# Patient Record
Sex: FEMALE | Race: WHITE | Employment: OTHER | URBAN - METROPOLITAN AREA
[De-identification: names, ages, dates, MRNs, and addresses within clinical notes are randomized per-mention and may not be internally consistent; named-entity substitution may affect disease eponyms.]

---

## 2017-01-16 RX ORDER — ATORVASTATIN CALCIUM 40 MG/1
TABLET, FILM COATED ORAL
Qty: 90 TAB | Refills: 1 | Status: SHIPPED | OUTPATIENT
Start: 2017-01-16 | End: 2017-07-03 | Stop reason: SDUPTHER

## 2017-01-16 RX ORDER — FENOFIBRIC ACID 135 MG/1
CAPSULE, DELAYED RELEASE ORAL
Qty: 90 CAP | Refills: 1 | Status: SHIPPED | OUTPATIENT
Start: 2017-01-16 | End: 2017-07-03 | Stop reason: SDUPTHER

## 2017-01-16 RX ORDER — VENLAFAXINE HYDROCHLORIDE 75 MG/1
75 CAPSULE, EXTENDED RELEASE ORAL DAILY
Qty: 30 CAP | Refills: 3 | Status: SHIPPED | OUTPATIENT
Start: 2017-01-16 | End: 2017-05-24 | Stop reason: SDUPTHER

## 2017-01-16 RX ORDER — VENLAFAXINE HYDROCHLORIDE 150 MG/1
CAPSULE, EXTENDED RELEASE ORAL
Qty: 90 CAP | Refills: 1 | Status: SHIPPED | OUTPATIENT
Start: 2017-01-16 | End: 2017-08-02 | Stop reason: SDUPTHER

## 2017-03-07 DIAGNOSIS — K21.9 GASTROESOPHAGEAL REFLUX DISEASE WITHOUT ESOPHAGITIS: ICD-10-CM

## 2017-03-07 RX ORDER — OMEPRAZOLE 40 MG/1
40 CAPSULE, DELAYED RELEASE ORAL DAILY
Qty: 90 CAP | Refills: 1 | Status: SHIPPED | OUTPATIENT
Start: 2017-03-07 | End: 2017-09-20 | Stop reason: SDUPTHER

## 2017-03-16 ENCOUNTER — HOSPITAL ENCOUNTER (OUTPATIENT)
Dept: LAB | Age: 72
Discharge: HOME | End: 2017-03-16
Payer: MEDICARE

## 2017-03-16 ENCOUNTER — OFFICE VISIT (OUTPATIENT)
Dept: FAMILY MEDICINE CLINIC | Age: 72
End: 2017-03-16

## 2017-03-16 VITALS
OXYGEN SATURATION: 99 % | HEART RATE: 74 BPM | RESPIRATION RATE: 16 BRPM | SYSTOLIC BLOOD PRESSURE: 126 MMHG | HEIGHT: 63 IN | TEMPERATURE: 97.7 F | WEIGHT: 181 LBS | DIASTOLIC BLOOD PRESSURE: 77 MMHG | BODY MASS INDEX: 32.07 KG/M2

## 2017-03-16 DIAGNOSIS — F41.9 ANXIETY: ICD-10-CM

## 2017-03-16 DIAGNOSIS — E03.9 HYPOTHYROIDISM, UNSPECIFIED TYPE: ICD-10-CM

## 2017-03-16 DIAGNOSIS — R51.9 HEADACHE, UNSPECIFIED HEADACHE TYPE: ICD-10-CM

## 2017-03-16 DIAGNOSIS — E78.2 MIXED HYPERLIPIDEMIA: ICD-10-CM

## 2017-03-16 DIAGNOSIS — E11.9 TYPE 2 DIABETES MELLITUS WITHOUT COMPLICATION, WITHOUT LONG-TERM CURRENT USE OF INSULIN (HCC): Primary | ICD-10-CM

## 2017-03-16 DIAGNOSIS — R29.6 FALLS FREQUENTLY: ICD-10-CM

## 2017-03-16 PROCEDURE — 80048 BASIC METABOLIC PNL TOTAL CA: CPT

## 2017-03-16 PROCEDURE — 80061 LIPID PANEL: CPT

## 2017-03-16 PROCEDURE — 36415 COLL VENOUS BLD VENIPUNCTURE: CPT

## 2017-03-16 PROCEDURE — 84443 ASSAY THYROID STIM HORMONE: CPT

## 2017-03-16 PROCEDURE — 83036 HEMOGLOBIN GLYCOSYLATED A1C: CPT

## 2017-03-16 NOTE — MR AVS SNAPSHOT
Visit Information Date & Time Provider Department Dept. Phone Encounter #  
 3/16/2017 10:30 AM Wallace Mckenzie Florentino [phone] [id number] Follow-up Instructions Return in about 4 weeks (around 4/13/2017), or sooner as needed. Your Appointments 5/4/2017 11:20 AM  
Medicare Physical with Donnette Gitelman, MD  
[facility] ([address]) Appt Note: -letter mailed [address] [address]  
[place] [address] [address] Upcoming Health Maintenance Date Due DTaP/Tdap/Td series (1 - Tdap) 10/2/2006 BREAST CANCER SCRN MAMMOGRAM 8/25/2016 EYE EXAM RETINAL OR DILATED Q1 4/13/2017 HEMOGLOBIN A1C Q6M 4/28/2017 MEDICARE YEARLY EXAM 4/30/2017 FOOT EXAM Q1 7/25/2017 MICROALBUMIN Q1 10/28/2017 LIPID PANEL Q1 10/28/2017 GLAUCOMA SCREENING Q2Y 4/13/2018 COLONOSCOPY 7/7/2020 Allergies as of 3/16/2017  Review Complete On: 3/16/2017 By: Maverick Peguero MD  
  
 Severity Noted Reaction Type Reactions Deserpidine  11/24/2014   Systemic Other (comments) Puts pressure around her head like a ring Current Immunizations  Reviewed on 10/25/2016 Name Date Influenza Vaccine 11/16/2015, 11/6/2012, 11/2/2011, 10/29/2010 Influenza Vaccine (Quad) PF 10/25/2016 Pneumococcal Conjugate (PCV-13) 9/10/2015 Pneumococcal Vaccine (Unspecified Type) 10/29/2010 Td 10/1/2006 Zoster Vaccine, Live 6/10/2014 Not reviewed this visit You Were Diagnosed With   
  
 Codes Comments Type 2 diabetes mellitus without complication, without long-term current use of insulin (HCC)    -  Primary ICD-10-CM: E11.9 ICD-9-CM: 250.00 Hypothyroidism, unspecified type     ICD-10-CM: E03.9 ICD-9-CM: 244.9 Mixed hyperlipidemia     ICD-10-CM: E78.2 ICD-9-CM: 272.2 Anxiety     ICD-10-CM: F41.9 ICD-9-CM: 300.00 Falls frequently     ICD-10-CM: R29.6 ICD-9-CM: V15.88 Headache, unspecified headache type     ICD-10-CM: R51 ICD-9-CM: 075. 0 Vitals BP Pulse Temp Resp Height(growth percentile) Weight(growth percentile) 126/77 (BP 1 Location: Left arm, BP Patient Position: Sitting) 74 97.7 °F (36.5 °C) (Oral) 16 5' 3\" (1.6 m) 181 lb (82.1 kg) SpO2 BMI OB Status Smoking Status 99% 32.06 kg/m2 Postmenopausal Never Smoker BMI and BSA Data Body Mass Index Body Surface Area 32.06 kg/m 2 1.91 m 2 Preferred Pharmacy Pharmacy Name Phone [facility], [address] Your Updated Medication List  
  
   
This list is accurate as of: 3/16/17 11:21 AM.  Always use your most recent med list.  
  
  
  
  
 aspirin delayed-release 81 mg tablet Take 1 Tab by mouth daily. atorvastatin 40 mg tablet Commonly known as:  LIPITOR  
TAKE ONE TABLET BY MOUTH DAILY  
  
 fenofibric acid 135 mg capsule Commonly known as:  TRILIPIX ER  
TAKE ONE CAPSULE BY MOUTH DAILY  
  
 levothyroxine 75 mcg tablet Commonly known as:  SYNTHROID Take 1 Tab by mouth Daily (before breakfast). omega-3 fatty acids-vitamin e 1,000 mg Cap Commonly known as:  FISH OIL Take 1 Cap by mouth daily. omeprazole 40 mg capsule Commonly known as:  PRILOSEC Take 1 Cap by mouth daily. SITagliptin 25 mg tablet Commonly known as:  Gillespie Miles TAKE ONE TABLET BY MOUTH DAILY * venlafaxine- mg capsule Commonly known as:  EFFEXOR-XR  
TAKE ONE CAPSULE BY MOUTH DAILY * venlafaxine-SR 75 mg capsule Commonly known as:  EFFEXOR-XR Take 1 Cap by mouth daily. vitamin D3-vitamin K2 (MK4) 1,000-100 unit-mcg Tab Take 5,000 Int'l Units by mouth daily. * Notice: This list has 2 medication(s) that are the same as other medications prescribed for you.  Read the directions carefully, and ask your doctor or other care provider to review them with you. We Performed the Following HEMOGLOBIN A1C WITH EAG [40293 CPT(R)] LIPID PANEL [12250 CPT(R)] METABOLIC PANEL, BASIC [15473 CPT(R)] TSH 3RD GENERATION [37983 CPT(R)] Follow-up Instructions Return in about 4 weeks (around 4/13/2017), or sooner as needed. To-Do List   
 03/16/2017 Imaging:  CT HEAD WO CONT Patient Instructions Preventing Falls: Care Instructions Your Care Instructions Getting around your home safely can be a challenge if you have injuries or health problems that make it easy for you to fall. Loose rugs and furniture in walkways are among the dangers for many older people who have problems walking or who have poor eyesight. People who have conditions such as arthritis, osteoporosis, or dementia also have to be careful not to fall. You can make your home safer with a few simple measures. Follow-up care is a key part of your treatment and safety. Be sure to make and go to all appointments, and call your doctor if you are having problems. It's also a good idea to know your test results and keep a list of the medicines you take. How can you care for yourself at home? Taking care of yourself · You may get dizzy if you do not drink enough water. To prevent dehydration, drink plenty of fluids, enough so that your urine is light yellow or clear like water. Choose water and other caffeine-free clear liquids. If you have kidney, heart, or liver disease and have to limit fluids, talk with your doctor before you increase the amount of fluids you drink. · Exercise regularly to improve your strength, muscle tone, and balance. Walk if you can. Swimming may be a good choice if you cannot walk easily. · Have your vision and hearing checked each year or any time you notice a change. If you have trouble seeing and hearing, you might not be able to avoid objects and could lose your balance. · Know the side effects of the medicines you take. Ask your doctor or pharmacist whether the medicines you take can affect your balance. Sleeping pills or sedatives can affect your balance. · Limit the amount of alcohol you drink. Alcohol can impair your balance and other senses. · Ask your doctor whether calluses or corns on your feet need to be removed. If you wear loose-fitting shoes because of calluses or corns, you can lose your balance and fall. · Talk to your doctor if you have numbness in your feet. Preventing falls at home · Remove raised doorway thresholds, throw rugs, and clutter. Repair loose carpet or raised areas in the floor. · Move furniture and electrical cords to keep them out of walking paths. · Use nonskid floor wax, and wipe up spills right away, especially on ceramic tile floors. · If you use a walker or cane, put rubber tips on it. If you use crutches, clean the bottoms of them regularly with an abrasive pad, such as steel wool. · Keep your house well lit, especially Rudine Lowing, and outside walkways. Use night-lights in areas such as hallways and bathrooms. Add extra light switches or use remote switches (such as switches that go on or off when you clap your hands) to make it easier to turn lights on if you have to get up during the night. · Install sturdy handrails on stairways. · Move items in your cabinets so that the things you use a lot are on the lower shelves (about waist level). · Keep a cordless phone and a flashlight with new batteries by your bed. If possible, put a phone in each of the main rooms of your house, or carry a cell phone in case you fall and cannot reach a phone. Or, you can wear a device around your neck or wrist. You push a button that sends a signal for help. · Wear low-heeled shoes that fit well and give your feet good support. Use footwear with nonskid soles.  Check the heels and soles of your shoes for wear. Repair or replace worn heels or soles. · Do not wear socks without shoes on wood floors. · Walk on the grass when the sidewalks are slippery. If you live in an area that gets snow and ice in the winter, sprinkle salt on slippery steps and sidewalks. Preventing falls in the bath · Install grab bars and nonskid mats inside and outside your shower or tub and near the toilet and sinks. · Use shower chairs and bath benches. · Use a hand-held shower head that will allow you to sit while showering. · Get into a tub or shower by putting the weaker leg in first. Get out of a tub or shower with your strong side first. 
· Repair loose toilet seats and consider installing a raised toilet seat to make getting on and off the toilet easier. · Keep your bathroom door unlocked while you are in the shower. Where can you learn more? Go to http://celestina-dontae.info/. Enter [phone] in the search box to learn more about \"Preventing Falls: Care Instructions. \" Current as of: August 4, 2016 Content Version: 11.1 © 6793-4702 [employer]. Care instructions adapted under license by [employer] (which disclaims liability or warranty for this information). If you have questions about a medical condition or this instruction, always ask your healthcare professional. [place] [address] any warranty or liability for your use of this information. Introducing [facility]! Dear Kamaljit Garza: Thank you for requesting a Vinobo account. Our records indicate that you already have an active Vinobo account. You can access your account anytime at [email or web address] Did you know that you can access your hospital and ER discharge instructions at any time in Vinobo? You can also review all of your test results from your hospital stay or ER visit. Additional Information If you have questions, please visit the Frequently Asked Questions section of the KangaDo website at [email or web address] Remember, KangaDo is NOT to be used for urgent needs. For medical emergencies, dial 911. Now available from your iPhone and Android! Please provide this summary of care documentation to your next provider. Your primary care clinician is listed as Jake Velarde. If you have any questions after today's visit, please call [phone].

## 2017-03-16 NOTE — PROGRESS NOTES
Subjective: Emma Murillo is a 66 y.o. female here today to establish care and for fasting labs. Transferring care from [facility]. States that she feels as though there is something pulling her body back and that this is causing her falls. Reports 3 falls over the past few weeks. Reports that her hands and feet feel as though they are going to sleep. Associated with headache. Denies visual disturbance. She reports problem with her hearing and she has hearing aids. She has been seen by Audiology and was told that she has a lot of wax in the ears. She wonders if this is occurring again. Diabetes: BG is measured at home, reports BG have been doing well, did not bring log in for review. Denies hypoglycemic episodes. · HgA1c: 6.6  · Lipid panel: LDL 67  · Urine microalbumin: normal (10/2016)   · On ACE or ARB: N  · On statin: Y  · On aspirin: N  · Diabetic foot exam: 7/25/16  · Dilated eye exam: 4/13/16   · Pneumococcal vaccine: 10/29/10, 9/10/15       Current Outpatient Prescriptions   Medication Sig Dispense Refill    SITagliptin (JANUVIA) 25 mg tablet TAKE ONE TABLET BY MOUTH DAILY 90 Tab 1    omeprazole (PRILOSEC) 40 mg capsule Take 1 Cap by mouth daily. 90 Cap 1    fenofibric acid (TRILIPIX ER) 135 mg capsule TAKE ONE CAPSULE BY MOUTH DAILY 90 Cap 1    atorvastatin (LIPITOR) 40 mg tablet TAKE ONE TABLET BY MOUTH DAILY 90 Tab 1    venlafaxine-SR (EFFEXOR-XR) 150 mg capsule TAKE ONE CAPSULE BY MOUTH DAILY 90 Cap 1    venlafaxine-SR (EFFEXOR-XR) 75 mg capsule Take 1 Cap by mouth daily. 30 Cap 3    levothyroxine (SYNTHROID) 75 mcg tablet Take 1 Tab by mouth Daily (before breakfast). 90 Tab 3    vitamin D3-vitamin K2, MK4, 1,000-100 unit-mcg tab Take 5,000 Int'l Units by mouth daily.  omega-3 fatty acids-vitamin e (FISH OIL) 1,000 mg cap Take 1 Cap by mouth daily. 30 Cap 3    aspirin delayed-release 81 mg tablet Take 1 Tab by mouth daily.  30 Tab 3       Allergies Allergen Reactions    Deserpidine Other (comments)     Puts pressure around her head like a ring       Past Medical History:   Diagnosis Date    Depression     Hypercholesterolemia     Thyroid disease     Urinary incontinence        Social History   Substance Use Topics    Smoking status: Never Smoker    Smokeless tobacco: Never Used    Alcohol use No        Review of Systems  Constitutional: negative for fevers and chills  Eyes: negative for visual disturbance and irritation  Ears, nose, mouth, throat, and face: negative for nasal congestion and sore throat  Respiratory: negative for cough, sputum or dyspnea on exertion  Cardiovascular: negative for chest pain, chest pressure/discomfort, palpitations, irregular heart beats, lower extremity edema  Gastrointestinal: negative for nausea, vomiting, change in bowel habits and abdominal pain  Genitourinary:negative for frequency, dysuria and hematuria     Objective:     Visit Vitals    /77 (BP 1 Location: Left arm, BP Patient Position: Sitting)    Pulse 74    Temp 97.7 °F (36.5 °C) (Oral)    Resp 16    Ht 5' 3\" (1.6 m)    Wt 181 lb (82.1 kg)    SpO2 99%    BMI 32.06 kg/m2      General appearance - alert, well appearing, and in no distress  Eyes - pupils equal and reactive, extraocular eye movements intact, sclera anicteric  Oropharyngx - mucous membranes moist, pharynx normal without lesions  Neck - supple, no significant adenopathy  Chest - clear to auscultation, no wheezes, rales or rhonchi, symmetric air entry, no tachypnea, retractions or cyanosis  Heart - normal rate, regular rhythm, normal S1, S2, no murmurs, rubs, clicks or gallops  Abdomen - soft, nontender, nondistended, no masses or organomegaly  Neurological - alert, oriented, normal speech, no focal findings or movement disorder noted  Mental Status - alert, oriented to person, place, and time, anxious    Assessment/Plan:   Dov Branch is a 66 y.o. female seen for:     1.  Type 2 diabetes mellitus without complication, without long-term current use of insulin ([facility] [address]): check labs, continue with current therapy at this time.   - METABOLIC PANEL, BASIC  - HEMOGLOBIN A1C WITH EAG    2. Hypothyroidism, unspecified type  - TSH 3RD GENERATION    3. Mixed hyperlipidemia  - LIPID PANEL    4. Anxiety: encouraged to follow up with her Psychiatrist.     5. Falls frequently: due to falls along with complaint of headache, will check head CT. Advised that she may either have performed at a [employer] [facility] [employer] facility or at [facility] (as this is closer for her). - CT HEAD WO CONT; Future    6. Headache, unspecified headache type  - CT HEAD WO CONT; Future    I have discussed the diagnosis with the patient and the intended plan as seen in the above orders. The patient has received an after-visit summary and questions were answered concerning future plans. I have discussed medication side effects and warnings with the patient as well. Patient verbalizes understanding of plan of care and denies further questions or concerns at this time. Informed patient to return to the office if symptoms worsen or if new symptoms arise. Follow-up Disposition:  Return in about 4 weeks (around 4/13/2017), or sooner as needed.

## 2017-03-16 NOTE — PROGRESS NOTES
Chief Complaint   Patient presents with    New Patient     pt previously seen at [facility].  Headache     off and on during the week    Hearing Problem     sees audiologist    Allergies     states allergies act up at times     \"REVIEWED RECORD IN PREPARATION FOR VISIT AND HAVE OBTAINED THE NECESSARY DOCUMENTATION\"  1. Have you been to the ER, urgent care clinic since your last visit? Hospitalized since your last visit? No    2. Have you seen or consulted any other health care providers outside of the [facility] since your last visit? Include any pap smears or colon screening. No  Patient does not have advanced directives.

## 2017-03-16 NOTE — PATIENT INSTRUCTIONS

## 2017-03-17 LAB
BUN SERPL-MCNC: 16 MG/DL (ref 8–27)
BUN/CREAT SERPL: 19 (ref 11–26)
CALCIUM SERPL-MCNC: 9.3 MG/DL (ref 8.7–10.3)
CHLORIDE SERPL-SCNC: 100 MMOL/L (ref 96–106)
CHOLEST SERPL-MCNC: 176 MG/DL (ref 100–199)
CO2 SERPL-SCNC: 25 MMOL/L (ref 18–29)
CREAT SERPL-MCNC: 0.85 MG/DL (ref 0.57–1)
EST. AVERAGE GLUCOSE BLD GHB EST-MCNC: 143 MG/DL
GLUCOSE SERPL-MCNC: 136 MG/DL (ref 65–99)
HBA1C MFR BLD: 6.6 % (ref 4.8–5.6)
HDLC SERPL-MCNC: 55 MG/DL
INTERPRETATION, 910389: NORMAL
LDLC SERPL CALC-MCNC: 103 MG/DL (ref 0–99)
Lab: NORMAL
POTASSIUM SERPL-SCNC: 4.6 MMOL/L (ref 3.5–5.2)
SODIUM SERPL-SCNC: 141 MMOL/L (ref 134–144)
TRIGL SERPL-MCNC: 91 MG/DL (ref 0–149)
TSH SERPL DL<=0.005 MIU/L-ACNC: 2.35 UIU/ML (ref 0.45–4.5)
VLDLC SERPL CALC-MCNC: 18 MG/DL (ref 5–40)

## 2017-03-30 DIAGNOSIS — F32.89 OTHER DEPRESSION: ICD-10-CM

## 2017-03-30 DIAGNOSIS — F41.9 ANXIETY: ICD-10-CM

## 2017-03-30 RX ORDER — BUPROPION HYDROCHLORIDE 150 MG/1
150 TABLET ORAL
Qty: 90 TAB | Refills: 1 | Status: CANCELLED | OUTPATIENT
Start: 2017-03-30 | End: 2017-09-26

## 2017-04-13 ENCOUNTER — OFFICE VISIT (OUTPATIENT)
Dept: FAMILY MEDICINE CLINIC | Age: 72
End: 2017-04-13

## 2017-04-13 VITALS
HEIGHT: 63 IN | WEIGHT: 183 LBS | DIASTOLIC BLOOD PRESSURE: 70 MMHG | OXYGEN SATURATION: 98 % | BODY MASS INDEX: 32.43 KG/M2 | SYSTOLIC BLOOD PRESSURE: 135 MMHG | RESPIRATION RATE: 16 BRPM | TEMPERATURE: 98.2 F | HEART RATE: 70 BPM

## 2017-04-13 DIAGNOSIS — M54.5 ACUTE MIDLINE LOW BACK PAIN, WITH SCIATICA PRESENCE UNSPECIFIED: Primary | ICD-10-CM

## 2017-04-13 DIAGNOSIS — S32.009A CLOSED FRACTURE OF LUMBAR VERTEBRA, UNSPECIFIED FRACTURE MORPHOLOGY, UNSPECIFIED LUMBAR VERTEBRAL LEVEL, INITIAL ENCOUNTER (HCC): ICD-10-CM

## 2017-04-13 DIAGNOSIS — Z87.81 HISTORY OF RIB FRACTURE: ICD-10-CM

## 2017-04-13 RX ORDER — IBUPROFEN 200 MG
TABLET ORAL
COMMUNITY
End: 2018-05-17 | Stop reason: ALTCHOICE

## 2017-04-13 RX ORDER — BUPROPION HYDROCHLORIDE 150 MG/1
TABLET ORAL
COMMUNITY
Start: 2017-03-27 | End: 2017-05-24 | Stop reason: SDUPTHER

## 2017-04-13 RX ORDER — OXYCODONE HYDROCHLORIDE 10 MG/1
10 TABLET ORAL
COMMUNITY
End: 2017-10-18 | Stop reason: ALTCHOICE

## 2017-04-13 NOTE — LETTER
4/13/2017 1:18 PM 
 
Ms. Chasity Chavez [address] [facility] 19 Dear Chasity Chavez: 
 
Please find your most recent results below. Resulted Orders TSH 3RD GENERATION Result Value Ref Range TSH 2.350 0.450 - 4.500 uIU/mL Narrative Performed at:  [address]  [id number] : Lisa Mooney MD, Phone:  [phone] LIPID PANEL Result Value Ref Range Cholesterol, total 176 100 - 199 mg/dL Triglyceride 91 0 - 149 mg/dL HDL Cholesterol 55 >39 mg/dL VLDL, calculated 18 5 - 40 mg/dL LDL, calculated 103 (H) 0 - 99 mg/dL Narrative Performed at:  [address]  [id number] : Lisa Mooney MD, Phone:  [phone] METABOLIC PANEL, BASIC Result Value Ref Range Glucose 136 (H) 65 - 99 mg/dL BUN 16 8 - 27 mg/dL Creatinine 0.85 0.57 - 1.00 mg/dL GFR est non-AA 68 >59 mL/min/1.73 GFR est AA 79 >59 mL/min/1.73  
 BUN/Creatinine ratio 19 11 - 26 Sodium 141 134 - 144 mmol/L Potassium 4.6 3.5 - 5.2 mmol/L Chloride 100 96 - 106 mmol/L  
 CO2 25 18 - 29 mmol/L Calcium 9.3 8.7 - 10.3 mg/dL Narrative Performed at:  [address]  [id number] : Lisa Mooney MD, Phone:  [phone] HEMOGLOBIN A1C WITH EAG Result Value Ref Range Hemoglobin A1c 6.6 (H) 4.8 - 5.6 % Comment:  
            Pre-diabetes: 5.7 - 6.4 Diabetes: >6.4 Glycemic control for adults with diabetes: <7.0 Estimated average glucose 143 mg/dL Narrative Performed at:  [address]  [id number] : Lisa Mooney MD, Phone:  [phone] RECOMMENDATIONS: 
Labs normal. Continue with current medications. Please call me if you have any questions: [phone] Sincerely, Cherise Long MD

## 2017-04-13 NOTE — PROGRESS NOTES
Chief Complaint   Patient presents with   Leonardo Councilman     states she fell off her back porch 3 weeks ago and landed on her right side. seen at [facility] and was admitted. Fx 12th rib and Fx L1 L2    Results     here to discusss CT head results     \"REVIEWED RECORD IN PREPARATION FOR VISIT AND HAVE OBTAINED THE NECESSARY DOCUMENTATION\"  1. Have you been to the ER, urgent care clinic since your last visit? Hospitalized since your last visit? Yes Where: [facility] for fall    2. Have you seen or consulted any other health care providers outside of the [facility] since your last visit? Include any pap smears or colon screening. Yes Where: see above  Patient does not have advanced directives.

## 2017-04-13 NOTE — MR AVS SNAPSHOT
Visit Information Date & Time Provider Department Dept. Phone Encounter #  
 4/13/2017  1:00 PM Tammi AmesMagdaleno [facility] [phone] [id number] Follow-up Instructions Return if symptoms worsen or fail to improve. Upcoming Health Maintenance Date Due DTaP/Tdap/Td series (1 - Tdap) 10/2/2006 BREAST CANCER SCRN MAMMOGRAM 8/25/2016 EYE EXAM RETINAL OR DILATED Q1 4/13/2017 MEDICARE YEARLY EXAM 4/30/2017 FOOT EXAM Q1 7/25/2017 HEMOGLOBIN A1C Q6M 9/16/2017 MICROALBUMIN Q1 10/28/2017 LIPID PANEL Q1 3/16/2018 GLAUCOMA SCREENING Q2Y 4/13/2018 COLONOSCOPY 7/7/2020 Allergies as of 4/13/2017  Review Complete On: 4/13/2017 By: Tammi Ames MD  
  
 Severity Noted Reaction Type Reactions Deserpidine  11/24/2014   Systemic Other (comments) Puts pressure around her head like a ring Current Immunizations  Reviewed on 10/25/2016 Name Date Influenza Vaccine 11/16/2015, 11/6/2012, 11/2/2011, 10/29/2010 Influenza Vaccine (Quad) PF 10/25/2016 Pneumococcal Conjugate (PCV-13) 9/10/2015 Pneumococcal Vaccine (Unspecified Type) 10/29/2010 Td 10/1/2006 Zoster Vaccine, Live 6/10/2014 Not reviewed this visit You Were Diagnosed With   
  
 Codes Comments Acute midline low back pain, with sciatica presence unspecified    -  Primary ICD-10-CM: M54.5 ICD-9-CM: 724.2 Closed fracture of lumbar vertebra, unspecified fracture morphology, unspecified lumbar vertebral level, [facility]     ICD-10-CM: F21.314V ICD-9-CM: 805.4 History of rib fracture     ICD-10-CM: Z87.81 ICD-9-CM: V15.51 Vitals BP Pulse Temp Resp Height(growth percentile) Weight(growth percentile) 135/70 (BP 1 Location: Left arm, BP Patient Position: Sitting) 70 98.2 °F (36.8 °C) (Oral) 16 5' 3\" (1.6 m) 183 lb (83 kg) SpO2 BMI OB Status Smoking Status 98% 32.42 kg/m2 Postmenopausal Never Smoker Vitals History BMI and BSA Data Body Mass Index Body Surface Area  
 32.42 kg/m 2 1.92 m 2 Preferred Pharmacy Pharmacy Name Phone [facility] [address] [phone] Your Updated Medication List  
  
   
This list is accurate as of: 4/13/17  1:40 PM.  Always use your most recent med list.  
  
  
  
  
 aspirin delayed-release 81 mg tablet Take 1 Tab by mouth daily. atorvastatin 40 mg tablet Commonly known as:  LIPITOR  
TAKE ONE TABLET BY MOUTH DAILY  
  
 buPROPion  mg tablet Commonly known as:  WELLBUTRIN XL  
  
 fenofibric acid 135 mg capsule Commonly known as:  TRILIPIX ER  
TAKE ONE CAPSULE BY MOUTH DAILY  
  
 levothyroxine 75 mcg tablet Commonly known as:  SYNTHROID Take 1 Tab by mouth Daily (before breakfast). MOTRIN  mg tablet Generic drug:  ibuprofen Take  by mouth. omega-3 fatty acids-vitamin e 1,000 mg Cap Commonly known as:  FISH OIL Take 1 Cap by mouth daily. omeprazole 40 mg capsule Commonly known as:  PRILOSEC Take 1 Cap by mouth daily. oxyCODONE IR 10 mg Tab immediate release tablet Commonly known as:  Verlena Thurmond Take 10 mg by mouth every four (4) hours as needed. SITagliptin 25 mg tablet Commonly known as:  Kidder Kemps TAKE ONE TABLET BY MOUTH DAILY * venlafaxine- mg capsule Commonly known as:  EFFEXOR-XR  
TAKE ONE CAPSULE BY MOUTH DAILY * venlafaxine-SR 75 mg capsule Commonly known as:  EFFEXOR-XR Take 1 Cap by mouth daily. vitamin D3-vitamin K2 (MK4) 1,000-100 unit-mcg Tab Take 5,000 Int'l Units by mouth daily. * Notice: This list has 2 medication(s) that are the same as other medications prescribed for you. Read the directions carefully, and ask your doctor or other care provider to review them with you. We Performed the Following REFERRAL TO ORTHOPEDICS [IDV370 Custom] Follow-up Instructions Return if symptoms worsen or fail to improve. Referral Information Referral ID Referred By Referred To  
  
 [id number] [facility] [id number], [address] Visits Status Start Date End Date 1 New Request 4/13/17 4/13/18 If your referral has a status of pending review or denied, additional information will be sent to support the outcome of this decision. Patient Instructions A Healthy Lifestyle: Care Instructions Your Care Instructions A healthy lifestyle can help you feel good, stay at a healthy weight, and have plenty of energy for both work and play. A healthy lifestyle is something you can share with your whole family. A healthy lifestyle also can lower your risk for serious health problems, such as high blood pressure, heart disease, and diabetes. You can follow a few steps listed below to improve your health and the health of your family. Follow-up care is a key part of your treatment and safety. Be sure to make and go to all appointments, and call your doctor if you are having problems. Its also a good idea to know your test results and keep a list of the medicines you take. How can you care for yourself at home? · Do not eat too much sugar, fat, or fast foods. You can still have dessert and treats now and then. The goal is moderation. · Start small to improve your eating habits. Pay attention to portion sizes, drink less juice and soda pop, and eat more fruits and vegetables. ¨ Eat a healthy amount of food. A 3-ounce serving of meat, for example, is about the size of a deck of cards. Fill the rest of your plate with vegetables and whole grains. ¨ Limit the amount of soda and sports drinks you have every day. Drink more water when you are thirsty. ¨ Eat at least 5 servings of fruits and vegetables every day.  It may seem like a lot, but it is not hard to reach this goal. A serving or helping is 1 piece of fruit, 1 cup of vegetables, or 2 cups of leafy, raw vegetables. Have an apple or some carrot sticks as an afternoon snack instead of a candy bar. Try to have fruits and/or vegetables at every meal. 
· Make exercise part of your daily routine. You may want to start with simple activities, such as walking, bicycling, or slow swimming. Try to be active 30 to 60 minutes every day. You do not need to do all 30 to 60 minutes all at once. For example, you can exercise 3 times a day for 10 or 20 minutes. Moderate exercise is safe for most people, but it is always a good idea to talk to your doctor before starting an exercise program. 
· Keep moving. Zhou Parents the lawn, work in the garden, or Marvin. Take the stairs instead of the elevator at work. · If you smoke, quit. People who smoke have an increased risk for heart attack, stroke, cancer, and other lung illnesses. Quitting is hard, but there are ways to boost your chance of quitting tobacco for good. ¨ Use nicotine gum, patches, or lozenges. ¨ Ask your doctor about stop-smoking programs and medicines. ¨ Keep trying. In addition to reducing your risk of diseases in the future, you will notice some benefits soon after you stop using tobacco. If you have shortness of breath or asthma symptoms, they will likely get better within a few weeks after you quit. · Limit how much alcohol you drink. Moderate amounts of alcohol (up to 2 drinks a day for men, 1 drink a day for women) are okay. But drinking too much can lead to liver problems, high blood pressure, and other health problems. Family health If you have a family, there are many things you can do together to improve your health. · Eat meals together as a family as often as possible. · Eat healthy foods. This includes fruits, vegetables, lean meats and dairy, and whole grains. · Include your family in your fitness plan. Most people think of activities such as jogging or tennis as the way to fitness, but there are many ways you and your family can be more active. Anything that makes you breathe hard and gets your heart pumping is exercise. Here are some tips: 
¨ Walk to do errands or to take your child to school or the bus. ¨ Go for a family bike ride after dinner instead of watching TV. Where can you learn more? Go to http://celestina-dontae.info/. Enter C461 in the search box to learn more about \"A Healthy Lifestyle: Care Instructions. \" Current as of: July 26, 2016 Content Version: 11.2 © 5773-1601 [employer]. Care instructions adapted under license by [employer] (which disclaims liability or warranty for this information). If you have questions about a medical condition or this instruction, always ask your healthcare professional. [address] any warranty or liability for your use of this information. Introducing [facility]! Dear Flora Wright: Thank you for requesting a iRe[employer] account. Our records indicate that you have previously registered for a iRez[employer] account but its currently inactive. Please call our iRez[employer] support line at [phone]. Additional Information If you have questions, please visit the Frequently Asked Questions section of the i[employer] website at [email or web address] Remember, iRe[employer]Q is NOT to be used for urgent needs. For medical emergencies, dial 911. Now available from your iPhone and Android! Please provide this summary of care documentation to your next provider. If you have any questions after today's visit, please call [phone].

## 2017-04-13 NOTE — PROGRESS NOTES
Subjective: Mliana Houston is a 66 y.o. female here today with her son for follow-up. Recent hospitalization at [facility] 3 weeks ago after sustaining a fall from her back porch. Reports that she fractured a rib and L1-L2 vertebrae. Son reports that the type of fracture she had was non-operable. Discharged home with [facility] skilled nursing and PT. Currently on oxycodone IR 10 mg which she takes every 4 hours as needed along with ibuprofen 200 mg. Still in a considerable amount of pain. Son states that [facility] nurse recommended that she discuss with PCP Orthopedic referral. Denies fever, chills, bowel or bladder incontinence, paresthesias, urinary sympoms. Has past history of sciatica. Son has purchased a rollator with seat to help her move around. Discussed with her that her most recent head CT from [facility] was negative. Current Outpatient Prescriptions   Medication Sig Dispense Refill    buPROPion XL (WELLBUTRIN XL) 150 mg tablet       oxyCODONE IR (ROXICODONE) 10 mg tab immediate release tablet Take 10 mg by mouth every four (4) hours as needed.  ibuprofen (MOTRIN IB) 200 mg tablet Take  by mouth.  SITagliptin (JANUVIA) 25 mg tablet TAKE ONE TABLET BY MOUTH DAILY 90 Tab 1    omeprazole (PRILOSEC) 40 mg capsule Take 1 Cap by mouth daily. 90 Cap 1    fenofibric acid (TRILIPIX ER) 135 mg capsule TAKE ONE CAPSULE BY MOUTH DAILY 90 Cap 1    atorvastatin (LIPITOR) 40 mg tablet TAKE ONE TABLET BY MOUTH DAILY 90 Tab 1    venlafaxine-SR (EFFEXOR-XR) 150 mg capsule TAKE ONE CAPSULE BY MOUTH DAILY 90 Cap 1    venlafaxine-SR (EFFEXOR-XR) 75 mg capsule Take 1 Cap by mouth daily. 30 Cap 3    levothyroxine (SYNTHROID) 75 mcg tablet Take 1 Tab by mouth Daily (before breakfast). 90 Tab 3    vitamin D3-vitamin K2, MK4, 1,000-100 unit-mcg tab Take 5,000 Int'l Units by mouth daily.  omega-3 fatty acids-vitamin e (FISH OIL) 1,000 mg cap Take 1 Cap by mouth daily.  30 Cap 3    aspirin delayed-release 81 mg tablet Take 1 Tab by mouth daily. 30 Tab 3       Allergies   Allergen Reactions    Deserpidine Other (comments)     Puts pressure around her head like a ring       Past Medical History:   Diagnosis Date    Depression     Hypercholesterolemia     Thyroid disease     Urinary incontinence        Social History   Substance Use Topics    Smoking status: Never Smoker    Smokeless tobacco: Never Used    Alcohol use No        Review of Systems  Pertinent items are noted in HPI. Objective:     Visit Vitals    /70 (BP 1 Location: Left arm, BP Patient Position: Sitting)    Pulse 70    Temp 98.2 °F (36.8 °C) (Oral)    Resp 16    Ht 5' 3\" (1.6 m)    Wt 183 lb (83 kg)    SpO2 98%    BMI 32.42 kg/m2      General appearance - alert, well appearing, and in no distress  Eyes - pupils equal and reactive, extraocular eye movements intact, sclera anicteric  Chest - clear to auscultation, no wheezes, rales or rhonchi, symmetric air entry, no tachypnea, retractions or cyanosis  Heart - normal rate, regular rhythm, normal S1, S2, no murmurs, rubs, clicks or gallops  Neurological - alert, oriented, normal speech, no focal findings or movement disorder noted  Extremities - no peripheral edema   Skin - bruising noted on bilateral forearms       Assessment/Plan:   Pedro Luis Read is a 66 y.o. female seen for:     1. Acute midline low back pain, with sciatica presence unspecified: due to #2.   - REFERRAL TO ORTHOPEDICS    2. Closed fracture of lumbar vertebra, unspecified fracture morphology, unspecified lumbar vertebral level, initial encounter [facility]): per history. No reported alarming history at this time. Hospital records have been requested. Continue with oxycodone IR as needed, discussed that she may increase dose of ibuprofen to 600 mg every 6 hours as needed. Discussed Orthopedic evaluation and referral placed.  Advised to go to [facility] and obtain CD of recent imaging.   - REFERRAL TO ORTHOPEDICS    3. History of rib fracture    I have discussed the diagnosis with the patient and the intended plan as seen in the above orders. The patient has received an after-visit summary and questions were answered concerning future plans. I have discussed medication side effects and warnings with the patient as well. Patient verbalizes understanding of plan of care and denies further questions or concerns at this time. Informed patient to return to the office if symptoms worsen or if new symptoms arise. Follow-up Disposition:  Return if symptoms worsen or fail to improve.

## 2017-04-13 NOTE — PATIENT INSTRUCTIONS

## 2017-04-18 ENCOUNTER — TELEPHONE (OUTPATIENT)
Dept: FAMILY MEDICINE CLINIC | Age: 72
End: 2017-04-18

## 2017-05-02 DIAGNOSIS — R51.9 HEADACHE, UNSPECIFIED HEADACHE TYPE: ICD-10-CM

## 2017-05-02 DIAGNOSIS — R29.6 FALLS FREQUENTLY: ICD-10-CM

## 2017-05-24 ENCOUNTER — OFFICE VISIT (OUTPATIENT)
Dept: FAMILY MEDICINE CLINIC | Age: 72
End: 2017-05-24

## 2017-05-24 VITALS
BODY MASS INDEX: 32.85 KG/M2 | WEIGHT: 185.4 LBS | OXYGEN SATURATION: 97 % | DIASTOLIC BLOOD PRESSURE: 69 MMHG | TEMPERATURE: 97.2 F | SYSTOLIC BLOOD PRESSURE: 130 MMHG | HEIGHT: 63 IN | RESPIRATION RATE: 18 BRPM | HEART RATE: 78 BPM

## 2017-05-24 DIAGNOSIS — Z13.39 SCREENING FOR ALCOHOLISM: ICD-10-CM

## 2017-05-24 DIAGNOSIS — Z71.89 ACP (ADVANCE CARE PLANNING): ICD-10-CM

## 2017-05-24 DIAGNOSIS — Z00.00 ENCOUNTER FOR MEDICARE ANNUAL WELLNESS EXAM: Primary | ICD-10-CM

## 2017-05-24 RX ORDER — BUPROPION HYDROCHLORIDE 150 MG/1
150 TABLET ORAL DAILY
Qty: 90 TAB | Refills: 1 | Status: SHIPPED | OUTPATIENT
Start: 2017-05-24 | End: 2017-11-13 | Stop reason: SDUPTHER

## 2017-05-24 RX ORDER — VENLAFAXINE HYDROCHLORIDE 75 MG/1
75 CAPSULE, EXTENDED RELEASE ORAL DAILY
Qty: 90 CAP | Refills: 1 | Status: SHIPPED | OUTPATIENT
Start: 2017-05-24 | End: 2017-11-13 | Stop reason: SDUPTHER

## 2017-05-24 NOTE — PROGRESS NOTES
This is an Initial Medicare Annual Wellness Exam (AWV) (Performed 12 months after IPPE or effective date of Medicare Part B enrollment, Once in a lifetime)    I have reviewed the patient's medical history in detail and updated the computerized patient record. History     Past Medical History:   Diagnosis Date    Depression     Hypercholesterolemia     Thyroid disease     Urinary incontinence         Past Surgical History:   Procedure Laterality Date    ABDOMEN SURGERY PROC UNLISTED      HX COLONOSCOPY      HX ENDOSCOPY      HX GYN      HX HEENT      HX ORTHOPAEDIC         Current Outpatient Prescriptions   Medication Sig Dispense Refill    buPROPion XL (WELLBUTRIN XL) 150 mg tablet       oxyCODONE IR (ROXICODONE) 10 mg tab immediate release tablet Take 10 mg by mouth every four (4) hours as needed.  ibuprofen (MOTRIN IB) 200 mg tablet Take  by mouth.  SITagliptin (JANUVIA) 25 mg tablet TAKE ONE TABLET BY MOUTH DAILY 90 Tab 1    omeprazole (PRILOSEC) 40 mg capsule Take 1 Cap by mouth daily. 90 Cap 1    fenofibric acid (TRILIPIX ER) 135 mg capsule TAKE ONE CAPSULE BY MOUTH DAILY 90 Cap 1    atorvastatin (LIPITOR) 40 mg tablet TAKE ONE TABLET BY MOUTH DAILY 90 Tab 1    venlafaxine-SR (EFFEXOR-XR) 150 mg capsule TAKE ONE CAPSULE BY MOUTH DAILY 90 Cap 1    venlafaxine-SR (EFFEXOR-XR) 75 mg capsule Take 1 Cap by mouth daily. 30 Cap 3    levothyroxine (SYNTHROID) 75 mcg tablet Take 1 Tab by mouth Daily (before breakfast). 90 Tab 3    vitamin D3-vitamin K2, MK4, 1,000-100 unit-mcg tab Take 5,000 Int'l Units by mouth daily.  omega-3 fatty acids-vitamin e (FISH OIL) 1,000 mg cap Take 1 Cap by mouth daily. 30 Cap 3    aspirin delayed-release 81 mg tablet Take 1 Tab by mouth daily. 30 Tab 3       Allergies   Allergen Reactions    Deserpidine Other (comments)     Puts pressure around her head like a ring       History reviewed. No pertinent family history.        Social History   Substance Use Topics    Smoking status: Never Smoker    Smokeless tobacco: Never Used    Alcohol use No       Patient Active Problem List   Diagnosis Code    Hypothyroid E03.9    HLD (hyperlipidemia) E78.5    Depression F32.9    Anxiety F41.9    Incontinence of urine in female R32    OA (osteoarthritis) M19.90    Type II or unspecified type diabetes mellitus without mention of complication, not stated as uncontrolled E11.9       Depression Risk Factor Screening:     PHQ over the last two weeks 10/25/2016   Little interest or pleasure in doing things More than half the days   Feeling down, depressed or hopeless More than half the days   Total Score PHQ 2 4   Trouble falling or staying asleep, or sleeping too much Several days   Feeling tired or having little energy Several days   Poor appetite or overeating Not at all   Feeling bad about yourself - or that you are a failure or have let yourself or your family down More than half the days   Trouble concentrating on things such as school, work, reading or watching TV Not at all   Moving or speaking so slowly that other people could have noticed; or the opposite being so fidgety that others notice Not at all   Thoughts of being better off dead, or hurting yourself in some way Not at all   PHQ 9 Score 8   How difficult have these problems made it for you to do your work, take care of your home and get along with others Somewhat difficult     She has active diagnosis of depression for which she is currently being treated. Alcohol Risk Factor Screening: On any occasion during the past 3 months, have you had more than 3 drinks containing alcohol? No    Do you average more than 7 drinks per week?   No    Functional Ability and Level of Safety:     Hearing Loss   none    Activities of Daily Living     ADL Assessment 5/24/2017   Feeding yourself No Help Needed   Getting from bed to chair No Help Needed   Getting dressed No Help Needed   Bathing or showering No Help Needed   Walk across the room (includes cane/walker) No Help Needed   Using the telphone No Help Needed   Taking your medications No Help Needed   Preparing meals No Help Needed   Managing money (expenses/bills) No Help Needed   Moderately strenuous housework (laundry) Help Needed   Shopping for personal items (toiletries/medicines) No Help Needed   Shopping for groceries No Help Needed   Driving Help Needed   Climbing a flight of stairs Help Needed   Getting to places beyond walking distances Help Needed       Fall Risk     Fall Risk Assessment, last 12 mths 5/24/2017   Able to walk? Yes   Fall in past 12 months?  No   Fall with injury? -   Number of falls in past 12 months -   Fall Risk Score -     Abuse Screen   Patient is not abused    Review of Systems   Constitutional: negative for fevers and chills  Eyes: negative for visual disturbance and irritation  Ears, nose, mouth, throat, and face: negative for nasal congestion and sore throat  Respiratory: negative for cough, sputum or dyspnea on exertion  Cardiovascular: negative for chest pain, chest pressure/discomfort, palpitations, irregular heart beats, lower extremity edema  Gastrointestinal: negative for nausea, vomiting, change in bowel habits and abdominal pain    Physical Examination     Evaluation of Cognitive Function:  Mood/affect:  happy  Appearance: age appropriate, well dressed and within normal Limits  Family member/caregiver input: None    Visit Vitals    /69 (BP 1 Location: Left arm, BP Patient Position: Sitting)    Pulse 78    Temp 97.2 °F (36.2 °C) (Oral)    Resp 18    Ht 5' 3\" (1.6 m)    Wt 185 lb 6.4 oz (84.1 kg)    SpO2 97%    BMI 32.84 kg/m2     General appearance - alert, well appearing, and in no distress  Eyes - pupils equal and reactive, extraocular eye movements intact, sclera anicteric  Mouth - mucous membranes moist, pharynx normal without lesions  Neck - supple, no significant adenopathy  Chest - clear to auscultation, no wheezes, rales or rhonchi, symmetric air entry, respirations unlabored   Heart - normal rate, regular rhythm, normal S1, S2, no murmurs, rubs, clicks or gallops  Skin - normal coloration and turgor  Neurologic - alert, oriented, normal speech, no focal findings or movement disorder noted    Patient Care Team:  Trina Trejo MD as PCP - General (Family Practice)  Aquiles Boucher MD as Physician (Nephrology)  Ana Cristina Estrada MD as Physician (Ophthalmology)  Salty Dominique RN as Nurse Navigator    Advice/Referrals/Counseling   Education and counseling provided:  Screening Mammography - states that she has had - will [facility]  Screening for glaucoma - she will schedule appointment     Assessment/Plan   Cecilia Carmona is a 66 y.o. female seen today for:     1. Encounter for Medicare annual wellness exam    2. Screening for alcoholism: negative. 3. ACP (advance care planning): see ACP note. I have discussed the diagnosis with the patient and the intended plan as seen in the above orders. The patient has received an after-visit summary and questions were answered concerning future plans. I have discussed medication side effects and warnings with the patient as well. Patient verbalizes understanding of plan of care and denies further questions or concerns at this time. Informed patient to return to the office if symptoms worsen or if new symptoms arise. Follow-up Disposition:  Return in about 1 year (around 5/24/2018) for annual Medicare Wellness Visit.

## 2017-05-24 NOTE — ACP (ADVANCE CARE PLANNING)
Advance Care Planning (ACP) Provider Conversation Snapshot    Date of ACP Conversation: 05/24/17  Persons included in Conversation:  patient  Length of ACP Conversation in minutes:  <16 minutes (Non-Billable)    Authorized Decision Maker (if patient is incapable of making informed decisions): This person is: Other Legally Authorized Decision Maker (e.g. Next of Kin) - her children: son Eliana Schaefer, daughter Jordan Coyne           For Patients with Decision Making Capacity:   Values/Goals: Exploration of values, goals, and preferences if recovery is not expected, even with continued medical treatment in the event of:  Imminent death  Severe, permanent brain injury  \"In these circumstances, what matters most to you? \"  Care focused more on comfort or quality of life.     Conversation Outcomes / Follow-Up Plan:   Recommended completion of Advance Directive form after review of ACP materials and conversation with prospective healthcare agent

## 2017-05-24 NOTE — PROGRESS NOTES
Chief Complaint   Patient presents with   [facility] Annual Wellness Visit     \"REVIEWED RECORD IN PREPARATION FOR VISIT AND HAVE OBTAINED THE NECESSARY DOCUMENTATION\"  1. Have you been to the ER, urgent care clinic since your last visit? Hospitalized since your last visit? No    2. Have you seen or consulted any other health care providers outside of the [facility] [place] [facility]"" since your last visit? Include any pap smears or colon screening. No  Patient does not have advanced directives.

## 2017-05-24 NOTE — PATIENT INSTRUCTIONS
Medicare Wellness Visit, Female    The best way to live healthy is to have a healthy lifestyle by eating a well-balanced diet, exercising regularly, limiting alcohol and stopping smoking. Regular physical exams and screening tests are another way to keep healthy. Preventive exams provided by your health care provider can find health problems before they become diseases or illnesses. Preventive services including immunizations, screening tests, monitoring and exams can help you take care of your own health. All people over age 72 should have a pneumovax  and and a prevnar shot to prevent pneumonia. These are once in a lifetime unless you and your provider decide differently. All people over 65 should have a yearly flu shot and a tetanus vaccine every 10 years. A bone mass density to screen for osteoporosis or thinning of the bones should be done every 2 years after 65. Screening for diabetes mellitus with a blood sugar test should be done every year. Glaucoma is a disease of the eye due to increased ocular pressure that can lead to blindness and it should be done every year by an eye professional.    Cardiovascular screening tests that check for elevated lipids (fatty part of blood) which can lead to heart disease and strokes should be done every 5 years. Colorectal screening that evaluates for blood or polyps in your colon should be done yearly as a stool test or every five years as a flexible sigmoidoscope or every 10 years as a colonoscopy up to age 76. Breast cancer screening with a mammogram is recommended biennially  for women age 54-69. Screening for cervical cancer with a pap smear and pelvic exam is recommended for women after age 72 years every 2 years up to age 79 or when the provider and patient decide to stop. If there is a history of cervical abnormalities or other increased risk for cancer then the test is recommended yearly.     Hepatitis C screening is also recommended for anyone born between [address] [place] [address] through [place] [address]. A shingles vaccine is also recommended once in a lifetime after age 61. Your Medicare Wellness Exam is recommended annually. Here is a list of your current Health Maintenance items with a due date:  Health Maintenance Due   Topic Date Due    DTaP/Tdap/Td  (1 - Tdap) 10/02/2006    Breast Cancer Screening  08/25/2016    Eye Exam  04/13/2017    Annual Well Visit  04/30/2017            Learning About Durable Power of  for Health Care  What is a durable power of  for health care? A durable power of  for health care is one type of the legal forms called advance directives. It lets you decide who you want to make treatment decisions for you if you cannot speak or decide for yourself. The person you choose is called your health care agent. Another type of advance directive is a living will. It lets you write down what kinds of treatment or life support you want or do not want. What should you think about when choosing a health care agent? Choose your health care agent carefully. This person may or may not be a family member. Talk to the person before you make your final decision. Make sure he or she is comfortable with this responsibility. It's a good idea to choose someone who:  · Is at least 25years old. · Knows you well and understands what makes life meaningful for you. · Understands your Sikhism and moral values. · Will do what you want, not what he or she wants. · Will be able to make difficult choices at a stressful time. · Will be able to refuse or stop treatment, if that is what you would want, even if you could die. · Will be firm and confident with health professionals if needed. · Will ask questions to get necessary information. · Lives near you or agrees to travel to you if needed. Your family may help you make medical decisions while you can still be part of that process.  But it is important to choose one person to be your health care agent in case you are not able to make decisions for yourself. If you don't fill out the legal form and name a health care agent, the decisions your family can make may be limited. Who will make decisions for you if you do not have a health care agent? If you don't have a health care agent or a living will, your family members may disagree about your medical care. And then some medical professionals who may not know you as well might have to make decisions for you. In some cases, a  makes the decisions. When you name a health care agent, it is very clear who has the power to make health decisions for you. How do you name a health care agent? You name your health care agent on a legal form. It is usually called a durable power of  for health care. Ask your hospital, state bar association, or office on aging where to find these forms. You must sign the form to make it legal. Some states require you to get the form notarized. This means that a person called a  watches you sign the form and then he or she signs the form. Some states also require that two or more witnesses sign the form. Be sure to tell your family members and doctors who your health care agent is. Keep your forms in a safe place. But make sure that your loved ones know where the forms are. This could be in your desk where you keep other important papers. Make sure your doctor has a copy of your forms. Where can you learn more? Go to http://celestina-dontae.info/. Enter [id number] in the search box to learn more about \"Learning About Durable Power of  for [facility]. \"  Current as of: November 17, 2016  Content Version: 11.2  © 6006-1538 [employer]. Care instructions adapted under license by [employer] (which disclaims liability or warranty for this information).  If you have questions about a medical condition or this instruction, always ask your healthcare professional. [address] any warranty or liability for your use of this information.

## 2017-05-24 NOTE — MR AVS SNAPSHOT
Visit Information Date & Time Provider Department Dept. Phone Encounter #  
 5/24/2017 10:00 AM Wallace Ku [phone] Follow-up Instructions Return in about 1 year (around 5/24/2018) for annual Medicare Wellness Visit. Upcoming Health Maintenance Date Due DTaP/Tdap/Td series (1 - Tdap) 10/2/2006 BREAST CANCER SCRN MAMMOGRAM 8/25/2016 EYE EXAM RETINAL OR DILATED Q1 4/13/2017 MEDICARE YEARLY EXAM 4/30/2017 FOOT EXAM Q1 7/25/2017 INFLUENZA AGE 9 TO ADULT 8/1/2017 HEMOGLOBIN A1C Q6M 9/16/2017 MICROALBUMIN Q1 10/28/2017 LIPID PANEL Q1 3/16/2018 GLAUCOMA SCREENING Q2Y 4/13/2018 COLONOSCOPY 7/7/2020 Allergies as of 5/24/2017  Review Complete On: 5/24/2017 By: Gary Caldwell MD  
  
 Severity Noted Reaction Type Reactions Deserpidine  11/24/2014   Systemic Other (comments) Puts pressure around her head like a ring Current Immunizations  Reviewed on 10/25/2016 Name Date Influenza Vaccine 11/16/2015, 11/6/2012, 11/2/2011, 10/29/2010 Influenza Vaccine (Quad) PF 10/25/2016 Pneumococcal Conjugate (PCV-13) 9/10/2015 Pneumococcal Vaccine (Unspecified Type) 10/29/2010 Td 10/1/2006 Zoster Vaccine, Live 6/10/2014 Not reviewed this visit You Were Diagnosed With   
  
 Codes Comments Encounter for Medicare annual wellness exam    -  Primary ICD-10-CM: Z00.00 ICD-9-CM: V70.0 Screening for alcoholism     ICD-10-CM: Z13.89 ICD-9-CM: V79.1 ACP (advance care planning)     ICD-10-CM: Z71.89 ICD-9-CM: V65.49 Vitals BP Pulse Temp Resp Height(growth percentile) Weight(growth percentile) 130/69 (BP 1 Location: Left arm, BP Patient Position: Sitting) 78 97.2 °F (36.2 °C) (Oral) 18 5' 3\" (1.6 m) 185 lb 6.4 oz (84.1 kg) SpO2 BMI OB Status Smoking Status 97% 32.84 kg/m2 Postmenopausal Never Smoker BMI and BSA Data Body Mass Index Body Surface Area  
 32.84 kg/m 2 1.93 m 2 Preferred Pharmacy Pharmacy Name Phone [facility] [address] [phone] Your Updated Medication List  
  
   
This list is accurate as of: 5/24/17 10:24 AM.  Always use your most recent med list.  
  
  
  
  
 aspirin delayed-release 81 mg tablet Take 1 Tab by mouth daily. atorvastatin 40 mg tablet Commonly known as:  LIPITOR  
TAKE ONE TABLET BY MOUTH DAILY  
  
 buPROPion  mg tablet Commonly known as:  WELLBUTRIN XL  
  
 fenofibric acid 135 mg capsule Commonly known as:  TRILIPIX ER  
TAKE ONE CAPSULE BY MOUTH DAILY  
  
 levothyroxine 75 mcg tablet Commonly known as:  SYNTHROID Take 1 Tab by mouth Daily (before breakfast). MOTRIN  mg tablet Generic drug:  ibuprofen Take  by mouth. omega-3 fatty acids-vitamin e 1,000 mg Cap Commonly known as:  FISH OIL Take 1 Cap by mouth daily. omeprazole 40 mg capsule Commonly known as:  PRILOSEC Take 1 Cap by mouth daily. oxyCODONE IR 10 mg Tab immediate release tablet Commonly known as:  Thressa [place] Take 10 mg by mouth every four (4) hours as needed. SITagliptin 25 mg tablet Commonly known as:  Wilfrid Chapel TAKE ONE TABLET BY MOUTH DAILY * venlafaxine- mg capsule Commonly known as:  EFFEXOR-XR  
TAKE ONE CAPSULE BY MOUTH DAILY * venlafaxine-SR 75 mg capsule Commonly known as:  EFFEXOR-XR Take 1 Cap by mouth daily. vitamin D3-vitamin K2 (MK4) 1,000-100 unit-mcg Tab Take 5,000 Int'l Units by mouth daily. * Notice: This list has 2 medication(s) that are the same as other medications prescribed for you. Read the directions carefully, and ask your doctor or other care provider to review them with you. Follow-up Instructions Return in about 1 year (around 5/24/2018) for annual Medicare Wellness Visit. Patient Instructions Medicare Wellness Visit, Female The best way to live healthy is to have a healthy lifestyle by eating a well-balanced diet, exercising regularly, limiting alcohol and stopping smoking. Regular physical exams and screening tests are another way to keep healthy. Preventive exams provided by your health care provider can find health problems before they become diseases or illnesses. Preventive services including immunizations, screening tests, monitoring and exams can help you take care of your own health. All people over age 72 should have a pneumovax  and and a prevnar shot to prevent pneumonia. These are once in a lifetime unless you and your provider decide differently. All people over 65 should have a yearly flu shot and a tetanus vaccine every 10 years. A bone mass density to screen for osteoporosis or thinning of the bones should be done every 2 years after 65. Screening for diabetes mellitus with a blood sugar test should be done every year. Glaucoma is a disease of the eye due to increased ocular pressure that can lead to blindness and it should be done every year by an eye professional. 
 
Cardiovascular screening tests that check for elevated lipids (fatty part of blood) which can lead to heart disease and strokes should be done every 5 years. Colorectal screening that evaluates for blood or polyps in your colon should be done yearly as a stool test or every five years as a flexible sigmoidoscope or every 10 years as a colonoscopy up to age 76. Breast cancer screening with a mammogram is recommended biennially  for women age 54-69. Screening for cervical cancer with a pap smear and pelvic exam is recommended for women after age 72 years every 2 years up to age 79 or when the provider and patient decide to stop. If there is a history of cervical abnormalities or other increased risk for cancer then the test is recommended yearly. Hepatitis C screening is also recommended for anyone born between [birth year] through [address]. A shingles vaccine is also recommended once in a lifetime after age 61. Your Medicare Wellness Exam is recommended annually. Here is a list of your current Health Maintenance items with a due date: 
Health Maintenance Due Topic Date Due  
 DTaP/Tdap/Td  (1 - Tdap) 10/02/2006  Breast Cancer Screening  08/25/2016 Wynne Loffler Eye Exam  04/13/2017 Wynne Loffler Annual Well Visit  04/30/2017 Learning About Durable Power of  for Health Care What is a durable power of  for health care? A durable power of  for health care is one type of the legal forms called advance directives. It lets you decide who you want to make treatment decisions for you if you cannot speak or decide for yourself. The person you choose is called your health care agent. Another type of advance directive is a living will. It lets you write down what kinds of treatment or life support you want or do not want. What should you think about when choosing a health care agent? Choose your health care agent carefully. This person may or may not be a family member. Talk to the person before you make your final decision. Make sure he or she is comfortable with this responsibility. It's a good idea to choose someone who: · Is at least 25years old. · Knows you well and understands what makes life meaningful for you. · Understands your [place] and moral values. · Will do what you want, not what he or she wants. · Will be able to make difficult choices at a stressful time. · Will be able to refuse or stop treatment, if that is what you would want, even if you could die. · Will be firm and confident with health professionals if needed. · Will ask questions to get necessary information. · Lives near you or agrees to travel to you if needed.  
Your family may help you make medical decisions while you can still be part of that process. But it is important to choose one person to be your health care agent in case you are not able to make decisions for yourself. If you don't fill out the legal form and name a health care agent, the decisions your family can make may be limited. Who will make decisions for you if you do not have a health care agent? If you don't have a health care agent or a living will, your family members may disagree about your medical care. And then some medical professionals who may not know you as well might have to make decisions for you. In some cases, a  makes the decisions. When you name a health care agent, it is very clear who has the power to make health decisions for you. How do you name a health care agent? You name your health care agent on a legal form. It is usually called a durable power of  for health care. Ask your hospital, state bar association, or office on aging where to find these forms. You must sign the form to make it legal. Some states require you to get the form notarized. This means that a person called a  watches you sign the form and then he or she signs the form. Some states also require that two or more witnesses sign the form. Be sure to tell your family members and doctors who your health care agent is. Keep your forms in a safe place. But make sure that your loved ones know where the forms are. This could be in your desk where you keep other important papers. Make sure your doctor has a copy of your forms. Where can you learn more? Go to http://celestina-dontae.info/. Enter [id number] in the search box to learn more about \"Learning About Durable Power of  for [facility]. \" Current as of: November 17, 2016 Content Version: 11.2 © 8005-6582 [employer].  Care instructions adapted under license by [employer] (which disclaims liability or warranty for this information). If you have questions about a medical condition or this instruction, always ask your healthcare professional. [address] any warranty or liability for your use of this information. Introducing \[facility] [place] [facility]\"" & HEALTH SERVICES! Dear Jah Mensah: Thank you for requesting a [employer] account. Our records indicate that you already have an active [employer] account. You can access your account anytime at [email or web address] Did you know that you can access your hospital and ER discharge instructions at any time in [employer]? You can also review all of your test results from your hospital stay or ER visit. Additional Information If you have questions, please visit the Frequently Asked Questions section of the [employer] website at [email or web address] Remember, [employer] is NOT to be used for urgent needs. For medical emergencies, dial 911. Now available from your iPhone and Android! Please provide this summary of care documentation to your next provider. Your primary care clinician is listed as Zenaida Murillo. If you have any questions after today's visit, please call [phone].

## 2017-06-07 DIAGNOSIS — E03.9 HYPOTHYROIDISM, UNSPECIFIED TYPE: ICD-10-CM

## 2017-06-08 RX ORDER — LEVOTHYROXINE SODIUM 75 UG/1
75 TABLET ORAL
Qty: 90 TAB | Refills: 3 | Status: SHIPPED | OUTPATIENT
Start: 2017-06-08 | End: 2018-06-11 | Stop reason: SDUPTHER

## 2017-06-29 RX ORDER — FENOFIBRIC ACID 135 MG/1
CAPSULE, DELAYED RELEASE ORAL
Qty: 90 CAP | Refills: 1 | OUTPATIENT
Start: 2017-06-29

## 2017-06-29 RX ORDER — ATORVASTATIN CALCIUM 40 MG/1
TABLET, FILM COATED ORAL
Qty: 90 TAB | Refills: 1 | OUTPATIENT
Start: 2017-06-29

## 2017-07-03 ENCOUNTER — TELEPHONE (OUTPATIENT)
Dept: FAMILY MEDICINE CLINIC | Age: 72
End: 2017-07-03

## 2017-07-03 RX ORDER — ATORVASTATIN CALCIUM 40 MG/1
TABLET, FILM COATED ORAL
Qty: 90 TAB | Refills: 1 | Status: SHIPPED | OUTPATIENT
Start: 2017-07-03 | End: 2018-01-12 | Stop reason: SDUPTHER

## 2017-07-03 RX ORDER — FENOFIBRIC ACID 135 MG/1
CAPSULE, DELAYED RELEASE ORAL
Qty: 90 CAP | Refills: 1 | Status: SHIPPED | OUTPATIENT
Start: 2017-07-03 | End: 2018-01-12 | Stop reason: SDUPTHER

## 2017-07-03 NOTE — TELEPHONE ENCOUNTER
Original request for medications had went to her previous PCP. Medications have been approved and sent to her pharmacy on file.      Orders Placed This Encounter    atorvastatin (LIPITOR) 40 mg tablet     Sig: TAKE ONE TABLET BY MOUTH DAILY     Dispense:  90 Tab     Refill:  1    fenofibric acid (TRILIPIX ER) 135 mg capsule     Sig: TAKE ONE CAPSULE BY MOUTH DAILY     Dispense:  90 Cap     Refill:  1

## 2017-08-02 RX ORDER — VENLAFAXINE HYDROCHLORIDE 150 MG/1
CAPSULE, EXTENDED RELEASE ORAL
Qty: 90 CAP | Refills: 1 | Status: SHIPPED | OUTPATIENT
Start: 2017-08-02 | End: 2018-01-12 | Stop reason: SDUPTHER

## 2017-08-02 NOTE — TELEPHONE ENCOUNTER
Orders Placed This Encounter    venlafaxine-SR (EFFEXOR-XR) 150 mg capsule     Sig: TAKE ONE CAPSULE BY MOUTH DAILY. Take with 75 mg capsule.      Dispense:  90 Cap     Refill:  1

## 2017-09-20 DIAGNOSIS — K21.9 GASTROESOPHAGEAL REFLUX DISEASE WITHOUT ESOPHAGITIS: ICD-10-CM

## 2017-09-21 RX ORDER — OMEPRAZOLE 40 MG/1
40 CAPSULE, DELAYED RELEASE ORAL DAILY
Qty: 90 CAP | Refills: 1 | Status: SHIPPED | OUTPATIENT
Start: 2017-09-21 | End: 2018-05-17 | Stop reason: ALTCHOICE

## 2017-10-18 ENCOUNTER — HOSPITAL ENCOUNTER (OUTPATIENT)
Dept: LAB | Age: 72
Discharge: HOME | End: 2017-10-18
Payer: MEDICARE

## 2017-10-18 ENCOUNTER — OFFICE VISIT (OUTPATIENT)
Dept: FAMILY MEDICINE CLINIC | Age: 72
End: 2017-10-18

## 2017-10-18 VITALS
RESPIRATION RATE: 18 BRPM | SYSTOLIC BLOOD PRESSURE: 150 MMHG | HEART RATE: 72 BPM | WEIGHT: 183 LBS | OXYGEN SATURATION: 99 % | BODY MASS INDEX: 32.43 KG/M2 | DIASTOLIC BLOOD PRESSURE: 81 MMHG | HEIGHT: 63 IN | TEMPERATURE: 98.7 F

## 2017-10-18 DIAGNOSIS — E03.9 HYPOTHYROIDISM, UNSPECIFIED TYPE: ICD-10-CM

## 2017-10-18 DIAGNOSIS — R42 DIZZINESS OF UNKNOWN CAUSE: ICD-10-CM

## 2017-10-18 DIAGNOSIS — R42 DISEQUILIBRIUM: Primary | ICD-10-CM

## 2017-10-18 DIAGNOSIS — R68.89 OTHER GENERAL SYMPTOMS AND SIGNS: ICD-10-CM

## 2017-10-18 DIAGNOSIS — R41.89 COGNITIVE DEFICITS: ICD-10-CM

## 2017-10-18 PROCEDURE — 82607 VITAMIN B-12: CPT

## 2017-10-18 PROCEDURE — 36415 COLL VENOUS BLD VENIPUNCTURE: CPT

## 2017-10-18 PROCEDURE — 84443 ASSAY THYROID STIM HORMONE: CPT

## 2017-10-18 PROCEDURE — 85025 COMPLETE CBC W/AUTO DIFF WBC: CPT

## 2017-10-18 PROCEDURE — 80053 COMPREHEN METABOLIC PANEL: CPT

## 2017-10-18 RX ORDER — CHLORHEXIDINE GLUCONATE 1.2 MG/ML
RINSE ORAL
COMMUNITY
Start: 2017-07-22 | End: 2018-05-17 | Stop reason: ALTCHOICE

## 2017-10-18 NOTE — PATIENT INSTRUCTIONS

## 2017-10-18 NOTE — MR AVS SNAPSHOT
Visit Information Date & Time Provider Department Dept. Phone Encounter #  
 10/18/2017 10:45 AM Amilcar LeoWallace [phone] [id number] Follow-up Instructions Return if symptoms worsen or fail to improve. Upcoming Health Maintenance Date Due DTaP/Tdap/Td series (1 - Tdap) 10/2/2006 EYE EXAM RETINAL OR DILATED Q1 4/13/2017 FOOT EXAM Q1 7/25/2017 INFLUENZA AGE 9 TO ADULT 8/1/2017 HEMOGLOBIN A1C Q6M 9/16/2017 MICROALBUMIN Q1 10/28/2017 BREAST CANCER SCRN MAMMOGRAM 11/9/2017 LIPID PANEL Q1 3/16/2018 GLAUCOMA SCREENING Q2Y 4/13/2018 MEDICARE YEARLY EXAM 5/25/2018 COLONOSCOPY 7/7/2020 Allergies as of 10/18/2017  Review Complete On: 10/18/2017 By: Amilcar Leo MD  
  
 Severity Noted Reaction Type Reactions Deserpidine  11/24/2014   Systemic Other (comments) Puts pressure around her head like a ring Current Immunizations  Reviewed on 10/25/2016 Name Date Influenza Vaccine 11/16/2015, 11/6/2012, 11/2/2011, 10/29/2010 Influenza Vaccine (Quad) PF 10/25/2016 Pneumococcal Conjugate (PCV-13) 9/10/2015 Pneumococcal Vaccine (Unspecified Type) 10/29/2010 Td 10/1/2006 Zoster Vaccine, Live 6/10/2014 Not reviewed this visit You Were Diagnosed With   
  
 Codes Comments Disequilibrium    -  Primary ICD-10-CM: O66 ICD-9-CM: 780.4 Hypothyroidism, unspecified type     ICD-10-CM: E03.9 ICD-9-CM: 244.9 Dizziness of unknown cause     ICD-10-CM: R42 ICD-9-CM: 780.4 Cognitive deficits     ICD-10-CM: R41.89 ICD-9-CM: 294.9 Other general symptoms and signs     ICD-10-CM: R68.89 ICD-9-CM: 780.99 Vitals BP Pulse Temp Resp Height(growth percentile) Weight(growth percentile) 150/81 (BP 1 Location: Right arm, BP Patient Position: Sitting) 72 98.7 °F (37.1 °C) (Oral) 18 5' 3\" (1.6 m) 183 lb (83 kg) SpO2 BMI OB Status Smoking Status 99% 32.42 kg/m2 Postmenopausal Never Smoker Vitals History BMI and BSA Data Body Mass Index Body Surface Area  
 32.42 kg/m 2 1.92 m 2 Preferred Pharmacy Pharmacy Name Phone [facility], [address] Your Updated Medication List  
  
   
This list is accurate as of: 10/18/17 11:48 AM.  Always use your most recent med list.  
  
  
  
  
 aspirin delayed-release 81 mg tablet Take 1 Tab by mouth daily. atorvastatin 40 mg tablet Commonly known as:  LIPITOR  
TAKE ONE TABLET BY MOUTH DAILY  
  
 buPROPion  mg tablet Commonly known as:  [place] Take 1 Tab by mouth daily. chlorhexidine 0.12 % solution Commonly known as:  PERIDEX  
  
 fenofibric acid 135 mg capsule Commonly known as:  TRILIPIX ER  
TAKE ONE CAPSULE BY MOUTH DAILY  
  
 levothyroxine 75 mcg tablet Commonly known as:  SYNTHROID Take 1 Tab by mouth Daily (before breakfast). MOTRIN  mg tablet Generic drug:  ibuprofen Take  by mouth. omega-3 fatty acids-vitamin e 1,000 mg Cap Commonly known as:  FISH OIL Take 1 Cap by mouth daily. omeprazole 40 mg capsule Commonly known as:  PRILOSEC Take 1 Cap by mouth daily. SITagliptin 25 mg tablet Commonly known as:  Mordecai Heads TAKE ONE TABLET BY MOUTH DAILY * venlafaxine-SR 75 mg capsule Commonly known as:  EFFEXOR-XR Take 1 Cap by mouth daily. * venlafaxine- mg capsule Commonly known as:  EFFEXOR-XR  
TAKE ONE CAPSULE BY MOUTH DAILY. Take with 75 mg capsule. vitamin D3-vitamin K2 (MK4) 1,000-100 unit-mcg Tab Take 5,000 Int'l Units by mouth daily. * Notice: This list has 2 medication(s) that are the same as other medications prescribed for you. Read the directions carefully, and ask your doctor or other care provider to review them with you. We Performed the Following CBC WITH AUTOMATED DIFF [13590 CPT(R)] METABOLIC PANEL, COMPREHENSIVE [64730 CPT(R)] REFERRAL TO NEUROLOGY [TXZ57 Custom] TSH 3RD GENERATION [86996 CPT(R)] VITAMIN B12 & FOLATE [66660 CPT(R)] Follow-up Instructions Return if symptoms worsen or fail to improve. Referral Information Referral ID Referred By Referred To  
  
 [id number] Ivania Sequeira MD   
   [address], [address] Phone: [phone] Fax: [phone] Visits Status Start Date End Date 1 New Request 10/18/17 10/18/18 If your referral has a status of pending review or denied, additional information will be sent to support the outcome of this decision. Patient Instructions Preventing Falls: Care Instructions Your Care Instructions Getting around your home safely can be a challenge if you have injuries or health problems that make it easy for you to fall. Loose rugs and furniture in walkways are among the dangers for many older people who have problems walking or who have poor eyesight. People who have conditions such as arthritis, osteoporosis, or dementia also have to be careful not to fall. You can make your home safer with a few simple measures. Follow-up care is a key part of your treatment and safety. Be sure to make and go to all appointments, and call your doctor if you are having problems. It's also a good idea to know your test results and keep a list of the medicines you take. How can you care for yourself at home? Taking care of yourself · You may get dizzy if you do not drink enough water. To prevent dehydration, drink plenty of fluids, enough so that your urine is light yellow or clear like water. Choose water and other caffeine-free clear liquids. If you have kidney, heart, or liver disease and have to limit fluids, talk with your doctor before you increase the amount of fluids you drink. · Exercise regularly to improve your strength, muscle tone, and balance. Walk if you can. Swimming may be a good choice if you cannot walk easily. · Have your vision and hearing checked each year or any time you notice a change. If you have trouble seeing and hearing, you might not be able to avoid objects and could lose your balance. · Know the side effects of the medicines you take. Ask your doctor or pharmacist whether the medicines you take can affect your balance. Sleeping pills or sedatives can affect your balance. · Limit the amount of alcohol you drink. Alcohol can impair your balance and other senses. · Ask your doctor whether calluses or corns on your feet need to be removed. If you wear loose-fitting shoes because of calluses or corns, you can lose your balance and fall. · Talk to your doctor if you have numbness in your feet. Preventing falls at home · Remove raised doorway thresholds, throw rugs, and clutter. Repair loose carpet or raised areas in the floor. · Move furniture and electrical cords to keep them out of walking paths. · Use nonskid floor wax, and wipe up spills right away, especially on ceramic tile floors. · If you use a walker or cane, put rubber tips on it. If you use crutches, clean the bottoms of them regularly with an abrasive pad, such as steel wool. · Keep your house well lit, especially Stanley Purvis, and outside walkways. Use night-lights in areas such as hallways and bathrooms. Add extra light switches or use remote switches (such as switches that go on or off when you clap your hands) to make it easier to turn lights on if you have to get up during the night. · Install sturdy handrails on stairways. · Move items in your cabinets so that the things you use a lot are on the lower shelves (about waist level). · Keep a cordless phone and a flashlight with new batteries by your bed.  If possible, put a phone in each of the main rooms of your house, or carry a cell phone in case you fall and cannot reach a phone. Or, you can wear a device around your neck or wrist. You push a button that sends a signal for help. · Wear low-heeled shoes that fit well and give your feet good support. Use footwear with nonskid soles. Check the heels and soles of your shoes for wear. Repair or replace worn heels or soles. · Do not wear socks without shoes on wood floors. · Walk on the grass when the sidewalks are slippery. If you live in an area that gets snow and ice in the winter, sprinkle salt on slippery steps and sidewalks. Preventing falls in the bath · Install grab bars and nonskid mats inside and outside your shower or tub and near the toilet and sinks. · Use shower chairs and bath benches. · Use a hand-held shower head that will allow you to sit while showering. · Get into a tub or shower by putting the weaker leg in first. Get out of a tub or shower with your strong side first. 
· Repair loose toilet seats and consider installing a raised toilet seat to make getting on and off the toilet easier. · Keep your bathroom door unlocked while you are in the shower. Where can you learn more? Go to http://celestina-dontae.info/. Enter [phone] [id number] [phone] in the search box to learn more about \"Preventing Falls: Care Instructions. \" Current as of: August 4, 2016 Content Version: 11.3 © 1526-1888 [employer]. Care instructions adapted under license by [employer] (which disclaims liability or warranty for this information). If you have questions about a medical condition or this instruction, always ask your healthcare professional. [place] [address] any warranty or liability for your use of this information. Introducing [facility]! Dear Kendy Nicole: Thank you for requesting a [employer] account. Our records indicate that you already have an active [employer] account.   You can access your account anytime at [email or web address] Did you know that you can access your hospital and ER discharge instructions at any time in [employer]? You can also review all of your test results from your hospital stay or ER visit. Additional Information If you have questions, please visit the Frequently Asked Questions section of the [employer] website at [email or web address] Remember, [employer] is NOT to be used for urgent needs. For medical emergencies, dial 911. Now available from your iPhone and Android! Please provide this summary of care documentation to your next provider. Your primary care clinician is listed as Harvey Merchant. If you have any questions after today's visit, please call [phone].

## 2017-10-18 NOTE — PROGRESS NOTES
Subjective: Dorine Alpers is a 74 y.o. female here today with her son for follow up of dizziness and disequilibrium. Her son reports that she has fallen on 3 occasions over the last 2 months. He states that she appears to lose her balance. Previously referred to Dr. Danyelle Pressley at the [facility] but did not go to the appointment. In addition feels as though she is more forgetful. Patient states that she had sleep study last month and needs CPAP. Dr. Bro Puga. Current Outpatient Prescriptions   Medication Sig Dispense Refill    chlorhexidine (PERIDEX) 0.12 % solution       omeprazole (PRILOSEC) 40 mg capsule Take 1 Cap by mouth daily. 90 Cap 1    SITagliptin (JANUVIA) 25 mg tablet TAKE ONE TABLET BY MOUTH DAILY 90 Tab 1    venlafaxine-SR (EFFEXOR-XR) 150 mg capsule TAKE ONE CAPSULE BY MOUTH DAILY. Take with 75 mg capsule. 90 Cap 1    atorvastatin (LIPITOR) 40 mg tablet TAKE ONE TABLET BY MOUTH DAILY 90 Tab 1    fenofibric acid (TRILIPIX ER) 135 mg capsule TAKE ONE CAPSULE BY MOUTH DAILY 90 Cap 1    levothyroxine (SYNTHROID) 75 mcg tablet Take 1 Tab by mouth Daily (before breakfast). 90 Tab 3    venlafaxine-SR (EFFEXOR-XR) 75 mg capsule Take 1 Cap by mouth daily. 90 Cap 1    buPROPion XL (WELLBUTRIN XL) 150 mg tablet Take 1 Tab by mouth daily. 90 Tab 1    ibuprofen (MOTRIN IB) 200 mg tablet Take  by mouth.  vitamin D3-vitamin K2, MK4, 1,000-100 unit-mcg tab Take 5,000 Int'l Units by mouth daily.  omega-3 fatty acids-vitamin e (FISH OIL) 1,000 mg cap Take 1 Cap by mouth daily. 30 Cap 3    aspirin delayed-release 81 mg tablet Take 1 Tab by mouth daily.  30 Tab 3       Allergies   Allergen Reactions    Deserpidine Other (comments)     Puts pressure around her head like a ring       Past Medical History:   Diagnosis Date    Depression     Hypercholesterolemia     Thyroid disease     Urinary incontinence        Social History   Substance Use Topics    Smoking status: Never Smoker    Smokeless tobacco: Never Used    Alcohol use No        Review of Systems  Pertinent items are noted in HPI. Objective:     Visit Vitals    /81 (BP 1 Location: Right arm, BP Patient Position: Sitting)    Pulse 72    Temp 98.7 °F (37.1 °C) (Oral)    Resp 18    Ht 5' 3\" (1.6 m)    Wt 183 lb (83 kg)    SpO2 99%    BMI 32.42 kg/m2      General appearance - alert, well appearing, and in no distress  Eyes - pupils equal and reactive, extraocular eye movements intact, sclera anicteric  Oropharyngx - mucous membranes moist, pharynx normal without lesions  Neck - supple, no significant adenopathy, carotids upstroke normal bilaterally, no bruits  Chest - clear to auscultation, no wheezes, rales or rhonchi, symmetric air entry, no tachypnea, retractions or cyanosis  Heart - normal rate, regular rhythm, normal S1, S2, no murmurs, rubs, clicks or gallops  Neurological - alert, oriented, normal speech, no focal findings or movement disorder noted, cranial nerves II through XII intact  Extremities - peripheral pulses normal, no pedal edema, no clubbing or cyanosis    Assessment/Plan:   Vivian Major is a 74 y.o. female seen for:     1. Disequilibrium: has been going on for some time now. She has had head [facility] performed at [facility] which was negative. Previously referred to Dr. Dayna Thomas but did not schedule appointment. Have not checked vitamin levels previously and thus will check today. I strongly suggested Neurology evaluation to which she is in agreement. Referral placed to [facility] Neurologist due to closeness to her home. - VITAMIN B12 & FOLATE  - REFERRAL TO NEUROLOGY    2. Hypothyroidism, unspecified type  - TSH 3RD GENERATION    3.  Dizziness of unknown cause  - CBC WITH AUTOMATED DIFF  - METABOLIC PANEL, COMPREHENSIVE  - TSH 3RD GENERATION  - VITAMIN B12 & FOLATE  - REFERRAL TO NEUROLOGY    4. Cognitive deficits  - REFERRAL TO NEUROLOGY    5. Other general symptoms and signs   - VITAMIN B12 & FOLATE    I have discussed the diagnosis with the patient and the intended plan as seen in the above orders. The patient has received an after-visit summary and questions were answered concerning future plans. I have discussed medication side effects and warnings with the patient as well. Patient verbalizes understanding of plan of care and denies further questions or concerns at this time. Informed patient to return to the office if symptoms worsen or if new symptoms arise. Follow-up Disposition:  Return if symptoms worsen or fail to improve.

## 2017-10-18 NOTE — PROGRESS NOTES
Identified pt with two pt identifiers(name and ). Chief Complaint   Patient presents with    Dizziness     about a year now    Headache    Sleep Problem     Had a bad headache monday night and took some motrin. Woke up again and the headache wasnt any better. Daughter says tuesday morning speech was slurred and hard to arouse    Incontinence     loosing control over bowels last week for about 3 days. she says it happens at least once a month.      Shortness of Breath    Nail Problem     Left foot        Health Maintenance Due   Topic    DTaP/Tdap/Td series (1 - Tdap)    EYE EXAM RETINAL OR DILATED Q1     FOOT EXAM Q1     INFLUENZA AGE 9 TO ADULT     HEMOGLOBIN A1C Q6M     MICROALBUMIN Q1     BREAST CANCER SCRN MAMMOGRAM        Wt Readings from Last 3 Encounters:   10/18/17 183 lb (83 kg)   17 185 lb 6.4 oz (84.1 kg)   17 183 lb (83 kg)     Temp Readings from Last 3 Encounters:   10/18/17 98.7 °F (37.1 °C) (Oral)   17 97.2 °F (36.2 °C) (Oral)   17 98.2 °F (36.8 °C) (Oral)     BP Readings from Last 3 Encounters:   10/18/17 150/81   17 130/69   17 135/70     Pulse Readings from Last 3 Encounters:   10/18/17 72   17 78   17 70         Learning Assessment:  :     Learning Assessment 10/28/2014   PRIMARY LEARNER Patient   HIGHEST LEVEL OF EDUCATION - PRIMARY LEARNER  GRADUATED HIGH SCHOOL OR GED   BARRIERS PRIMARY LEARNER NONE   CO-LEARNER CAREGIVER No   PRIMARY LANGUAGE ENGLISH    NEED No   LEARNER PREFERENCE PRIMARY LISTENING   LEARNING SPECIAL TOPICS no   ANSWERED BY self   RELATIONSHIP SELF   ASSESSMENT COMMENT no       Depression Screening:  :     PHQ over the last two weeks 10/25/2016   Little interest or pleasure in doing things More than half the days   Feeling down, depressed or hopeless More than half the days   Total Score PHQ 2 4   Trouble falling or staying asleep, or sleeping too much Several days   Feeling tired or having little energy Several days   Poor appetite or overeating Not at all   Feeling bad about yourself - or that you are a failure or have let yourself or your family down More than half the days   Trouble concentrating on things such as school, work, reading or watching TV Not at all   Moving or speaking so slowly that other people could have noticed; or the opposite being so fidgety that others notice Not at all   Thoughts of being better off dead, or hurting yourself in some way Not at all   PHQ 9 Score 8   How difficult have these problems made it for you to do your work, take care of your home and get along with others Somewhat difficult       Fall Risk Assessment:  :     Fall Risk Assessment, last 12 mths 5/24/2017   Able to walk? Yes   Fall in past 12 months? No   Fall with injury? -   Number of falls in past 12 months -   Fall Risk Score -       Abuse Screening:  :     Abuse Screening Questionnaire 10/28/2014   Do you ever feel afraid of your partner? N   Are you in a relationship with someone who physically or mentally threatens you? N   Is it safe for you to go home? Y       Coordination of Care Questionnaire:  :     1) Have you been to an emergency room, urgent care clinic since your last visit? no   Hospitalized since your last visit? no             2) Have you seen or consulted any other health care providers outside of [address] [place] [address] since your last visit? yes Dentist and a sleep study done  (Include any pap smears or colon screenings in this section.)    3) Do you have an Advance Directive on file? no  Are you interested in receiving information about Advance Directives? no    Reviewed record in preparation for visit and have obtained necessary documentation. Medication reconciliation up to date and corrected with patient at this time.

## 2017-10-19 LAB
ALBUMIN SERPL-MCNC: 4.3 G/DL (ref 3.5–4.8)
ALBUMIN/GLOB SERPL: 1.9 {RATIO} (ref 1.2–2.2)
ALP SERPL-CCNC: 73 IU/L (ref 39–117)
ALT SERPL-CCNC: 17 IU/L (ref 0–32)
AST SERPL-CCNC: 22 IU/L (ref 0–40)
BASOPHILS # BLD AUTO: 0 X10E3/UL (ref 0–0.2)
BASOPHILS NFR BLD AUTO: 1 %
BILIRUB SERPL-MCNC: 0.4 MG/DL (ref 0–1.2)
BUN SERPL-MCNC: 15 MG/DL (ref 8–27)
BUN/CREAT SERPL: 16 (ref 12–28)
CALCIUM SERPL-MCNC: 9.1 MG/DL (ref 8.7–10.3)
CHLORIDE SERPL-SCNC: 100 MMOL/L (ref 96–106)
CO2 SERPL-SCNC: 24 MMOL/L (ref 18–29)
CREAT SERPL-MCNC: 0.96 MG/DL (ref 0.57–1)
EOSINOPHIL # BLD AUTO: 0.1 X10E3/UL (ref 0–0.4)
EOSINOPHIL NFR BLD AUTO: 2 %
ERYTHROCYTE [DISTWIDTH] IN BLOOD BY AUTOMATED COUNT: 14.9 % (ref 12.3–15.4)
FOLATE SERPL-MCNC: 7.6 NG/ML
GFR SERPLBLD CREATININE-BSD FMLA CKD-EPI: 58 ML/MIN/1.73
GFR SERPLBLD CREATININE-BSD FMLA CKD-EPI: 67 ML/MIN/1.73
GLOBULIN SER CALC-MCNC: 2.3 G/DL (ref 1.5–4.5)
GLUCOSE SERPL-MCNC: 129 MG/DL (ref 65–99)
HCT VFR BLD AUTO: 38.9 % (ref 34–46.6)
HGB BLD-MCNC: 12.5 G/DL (ref 11.1–15.9)
IMM GRANULOCYTES # BLD: 0 X10E3/UL (ref 0–0.1)
IMM GRANULOCYTES NFR BLD: 0 %
INTERPRETATION: NORMAL
LYMPHOCYTES # BLD AUTO: 1 X10E3/UL (ref 0.7–3.1)
LYMPHOCYTES NFR BLD AUTO: 18 %
MCH RBC QN AUTO: 27.7 PG (ref 26.6–33)
MCHC RBC AUTO-ENTMCNC: 32.1 G/DL (ref 31.5–35.7)
MCV RBC AUTO: 86 FL (ref 79–97)
MONOCYTES # BLD AUTO: 0.5 X10E3/UL (ref 0.1–0.9)
MONOCYTES NFR BLD AUTO: 8 %
NEUTROPHILS # BLD AUTO: 4.1 X10E3/UL (ref 1.4–7)
NEUTROPHILS NFR BLD AUTO: 71 %
PLATELET # BLD AUTO: 233 X10E3/UL (ref 150–379)
POTASSIUM SERPL-SCNC: 4.4 MMOL/L (ref 3.5–5.2)
PROT SERPL-MCNC: 6.6 G/DL (ref 6–8.5)
RBC # BLD AUTO: 4.51 X10E6/UL (ref 3.77–5.28)
SODIUM SERPL-SCNC: 141 MMOL/L (ref 134–144)
TSH SERPL DL<=0.005 MIU/L-ACNC: 2.83 UIU/ML (ref 0.45–4.5)
VIT B12 SERPL-MCNC: 249 PG/ML (ref 211–946)
WBC # BLD AUTO: 5.8 X10E3/UL (ref 3.4–10.8)

## 2017-10-27 LAB — HBA1C MFR BLD HPLC: NORMAL %

## 2017-11-01 ENCOUNTER — TELEPHONE (OUTPATIENT)
Dept: FAMILY MEDICINE CLINIC | Age: 72
End: 2017-11-01

## 2017-11-01 NOTE — TELEPHONE ENCOUNTER
Records received and reviewed from [facility]. She has been started on Donepezil 10 mg (MMSE 26/30). Gait instability secondary to diabetic polyneuropathy and plan is for EMG of bilateral lower extremities. Labs notable for A1c 7.2% which is an increase, but is not far from goal of <7%. Patient called and this result discussed. She reports medication compliance. Continue with current treatment at this time, but will need to follow up in 3 months. She verbalizes understanding.

## 2017-11-16 RX ORDER — VENLAFAXINE HYDROCHLORIDE 75 MG/1
CAPSULE, EXTENDED RELEASE ORAL
Qty: 90 CAP | Refills: 1 | Status: SHIPPED | OUTPATIENT
Start: 2017-11-16 | End: 2018-05-11 | Stop reason: SDUPTHER

## 2017-11-16 RX ORDER — BUPROPION HYDROCHLORIDE 150 MG/1
TABLET ORAL
Qty: 90 TAB | Refills: 1 | Status: SHIPPED | OUTPATIENT
Start: 2017-11-16 | End: 2018-01-12 | Stop reason: SDUPTHER

## 2017-11-20 ENCOUNTER — TELEPHONE (OUTPATIENT)
Dept: FAMILY MEDICINE CLINIC | Age: 72
End: 2017-11-20

## 2017-11-20 NOTE — TELEPHONE ENCOUNTER
----- Message from Alfreda Kerr sent at 11/18/2017 10:17 AM EST -----  Regarding: Dr. Shirley Moreno  Pt requesting for medication to be called in to [facility] for nervous stomach.  Best contact number [phone]

## 2018-04-09 ENCOUNTER — OFFICE VISIT (OUTPATIENT)
Dept: FAMILY MEDICINE CLINIC | Age: 73
End: 2018-04-09

## 2018-04-09 VITALS
DIASTOLIC BLOOD PRESSURE: 68 MMHG | WEIGHT: 175 LBS | TEMPERATURE: 97.8 F | BODY MASS INDEX: 31.01 KG/M2 | SYSTOLIC BLOOD PRESSURE: 108 MMHG | RESPIRATION RATE: 18 BRPM | HEART RATE: 72 BPM | OXYGEN SATURATION: 98 % | HEIGHT: 63 IN

## 2018-04-09 DIAGNOSIS — E78.2 MIXED HYPERLIPIDEMIA: ICD-10-CM

## 2018-04-09 DIAGNOSIS — E03.9 HYPOTHYROIDISM, UNSPECIFIED TYPE: ICD-10-CM

## 2018-04-09 DIAGNOSIS — E11.42 TYPE 2 DIABETES MELLITUS WITH DIABETIC POLYNEUROPATHY, WITHOUT LONG-TERM CURRENT USE OF INSULIN (HCC): Primary | ICD-10-CM

## 2018-04-09 DIAGNOSIS — R15.1 FECAL SMEARING: ICD-10-CM

## 2018-04-09 DIAGNOSIS — F41.8 DEPRESSION WITH ANXIETY: ICD-10-CM

## 2018-04-09 LAB
ALBUMIN UR QL STRIP: 10 MG/L
CREATININE, URINE POC: 200 MG/DL
MICROALBUMIN/CREAT RATIO POC: <30 MG/G

## 2018-04-09 RX ORDER — PREDNISOLONE ACETATE 10 MG/ML
SUSPENSION/ DROPS OPHTHALMIC
COMMUNITY
Start: 2018-04-06 | End: 2018-04-09 | Stop reason: ALTCHOICE

## 2018-04-09 RX ORDER — KETOROLAC TROMETHAMINE 5 MG/ML
SOLUTION OPHTHALMIC
COMMUNITY
Start: 2018-04-06 | End: 2018-04-09 | Stop reason: ALTCHOICE

## 2018-04-09 RX ORDER — OFLOXACIN 3 MG/ML
SOLUTION/ DROPS OPHTHALMIC
COMMUNITY
Start: 2018-04-06 | End: 2018-04-09 | Stop reason: ALTCHOICE

## 2018-04-09 RX ORDER — BUPROPION HYDROCHLORIDE 150 MG/1
150 TABLET ORAL DAILY
COMMUNITY
Start: 2018-03-27 | End: 2018-05-18 | Stop reason: SDUPTHER

## 2018-04-09 NOTE — PROGRESS NOTES
Subjective: Isak Haskins is a 74 y.o. female who presents for follow up of diabetes and hyperlipidemia. Diabetes mellitus: does not monitor BG at home. No reported feeling of hypoglycemia. Reports compliance with medications. She does adhere to diabetic diet. On high-intensity statin therapy and tolerating well with no reported myalgias. Hypothyroidism: compliant with medication. Denies temperature intolerance, changes in bowel habits, skin changes. Cardiovascular ROS: taking medications as instructed, no medication side effects noted, no TIA's, no chest pain on exertion, no dyspnea on exertion, no swelling of ankles, no orthostatic dizziness or lightheadedness, no orthopnea or paroxysmal nocturnal dyspnea. New concerns:   - Bowel incontinence: onset was approximately 6 months ago with increasing frequency. Small-medium sized stool occurring at least once weekly. Occurs during the day. No known triggers. She has a bowel movement daily which is easily passed. Denies back pain, saddle anesthesia, leg weakness.  - She reports that she had an episode last Saturday and \"I just lost it\". Current Outpatient Prescriptions   Medication Sig Dispense Refill    buPROPion XL (WELLBUTRIN XL) 150 mg tablet Take 150 mg by mouth daily.  JANUVIA 25 mg tablet TAKE ONE TABLET BY MOUTH DAILY 90 Tab 1    atorvastatin (LIPITOR) 40 mg tablet TAKE ONE TABLET BY MOUTH DAILY 90 Tab 1    venlafaxine-SR (EFFEXOR-XR) 150 mg capsule TAKE ONE CAPSULE BY MOUTH DAILY. Take with 75 mg capsule. 90 Cap 1    fenofibric acid (TRILIPIX ER) 135 mg capsule TAKE ONE CAPSULE BY MOUTH DAILY 90 Cap 1    venlafaxine-SR (EFFEXOR-XR) 75 mg capsule Take 1 Cap by mouth daily. 90 Cap 1    chlorhexidine (PERIDEX) 0.12 % solution       omeprazole (PRILOSEC) 40 mg capsule Take 1 Cap by mouth daily. 90 Cap 1    levothyroxine (SYNTHROID) 75 mcg tablet Take 1 Tab by mouth Daily (before breakfast).  90 Tab 3    ibuprofen (MOTRIN IB) 200 mg tablet Take  by mouth.  vitamin D3-vitamin K2, MK4, 1,000-100 unit-mcg tab Take 5,000 Int'l Units by mouth daily.  omega-3 fatty acids-vitamin e (FISH OIL) 1,000 mg cap Take 1 Cap by mouth daily. 30 Cap 3    aspirin delayed-release 81 mg tablet Take 1 Tab by mouth daily. 30 Tab 3       Allergies   Allergen Reactions    Deserpidine Other (comments)     Puts pressure around her head like a ring       Past Medical History:   Diagnosis Date    Depression     Hypercholesterolemia     BERTRAM on CPAP     Dr. Maryjane Alexander     Thyroid disease     Urinary incontinence        Social History   Substance Use Topics    Smoking status: Never Smoker    Smokeless tobacco: Never Used    Alcohol use No        Review of Systems, additional:  Pertinent items are noted in HPI. Objective:     Visit Vitals    /68 (BP 1 Location: Right arm, BP Patient Position: Sitting)  Comment: Manual    Pulse 72    Temp 97.8 °F (36.6 °C) (Oral)  Comment: .     Resp 18    Ht 5' 3\" (1.6 m)    Wt 175 lb (79.4 kg)    SpO2 98%    BMI 31 kg/m2     General appearance - alert, well appearing, and in no distress  Mental status - alert, oriented to person, place, and time, normal mood, behavior, speech, dress, motor activity, and thought processes  Eyes - pupils equal and reactive, extraocular eye movements intact, sclera anicteric  Neck - supple, no significant adenopathy, thyroid exam: thyroid is normal in size without nodules or tenderness  Chest - clear to auscultation, no wheezes, rales or rhonchi, symmetric air entry, no tachypnea, retractions or cyanosis  Heart - normal rate, regular rhythm, normal S1, S2, no murmurs, rubs, clicks or gallops  Abdomen - soft, nontender, nondistended, no masses or organomegaly, RECTAL EXAM: external hemorrhoids noted, sphincter tone normal  Extremities - peripheral pulses normal, no pedal edema, no clubbing or cyanosis    Recent Results (from the past 12 hour(s))   AMB POC URINE, MICROALBUMIN, SEMIQUANT (3 RESULTS)    Collection Time: 04/09/18  9:55 AM   Result Value Ref Range    ALBUMIN, URINE POC 10 Negative mg/L    CREATININE, URINE  mg/dL    Microalbumin/creat ratio (POC) <30 <30 MG/G     Assessment/Plan:   Sunil Mao is a 74 y.o. female seen today for:     1. Type 2 diabetes mellitus with diabetic polyneuropathy, without long-term current use of insulin [facility]): continue with current therapy. She will return for fasting labs. - HEMOGLOBIN A1C WITH EAG  - AMB POC URINE, MICROALBUMIN, SEMIQUANT (3 RESULTS)    2. Mixed hyperlipidemia: on high-intensity statin and will continue.   - METABOLIC PANEL, COMPREHENSIVE  - LIPID PANEL    3. Hypothyroidism, unspecified type: check TSH.   - TSH RFX ON ABNORMAL TO FREE T4    4. Fecal smearing: normal rectal tone, no back pain or symptoms of cauda equina reported. Recommend that she see GI.     5. Depression with anxiety: continue with current therapy. I have discussed the diagnosis with the patient and the intended plan as seen in the above orders. The patient has received an after-visit summary and questions were answered concerning future plans. I have discussed medication side effects and warnings with the patient as well. Patient verbalizes understanding of plan of care and denies further questions or concerns at this time. Informed patient to return to the office if symptoms worsen or if new symptoms arise. Follow-up Disposition:  Return in about 4 months (around 8/9/2018), or if symptoms worsen or fail to improve.

## 2018-04-09 NOTE — PROGRESS NOTES
Identified pt with two pt identifiers(name and ). Chief Complaint   Patient presents with    Stress     Patient states she had an \"Episode\" Saturday where she emotionally couldnt handle the household chores anymore.      Bowel Incontinence     Patient states its hard to make it sometimes         Health Maintenance Due   Topic    DTaP/Tdap/Td series (1 - Tdap)    EYE EXAM RETINAL OR DILATED Q1     FOOT EXAM Q1     Influenza Age 5 to Adult     MICROALBUMIN Q1     BREAST CANCER SCRN MAMMOGRAM     LIPID PANEL Q1     GLAUCOMA SCREENING Q2Y     HEMOGLOBIN A1C Q6M        Wt Readings from Last 3 Encounters:   18 175 lb (79.4 kg)   10/18/17 183 lb (83 kg)   17 185 lb 6.4 oz (84.1 kg)     Temp Readings from Last 3 Encounters:   18 97.8 °F (36.6 °C) (Oral)   10/18/17 98.7 °F (37.1 °C) (Oral)   17 97.2 °F (36.2 °C) (Oral)     BP Readings from Last 3 Encounters:   18 108/68   10/18/17 150/81   17 130/69     Pulse Readings from Last 3 Encounters:   18 72   10/18/17 72   17 78         Learning Assessment:  :     Learning Assessment 10/28/2014   PRIMARY LEARNER Patient   HIGHEST LEVEL OF EDUCATION - PRIMARY LEARNER  GRADUATED HIGH SCHOOL OR GED   BARRIERS PRIMARY LEARNER NONE   CO-LEARNER CAREGIVER No   PRIMARY LANGUAGE ENGLISH    NEED No   LEARNER PREFERENCE PRIMARY LISTENING   LEARNING SPECIAL TOPICS no   ANSWERED BY self   RELATIONSHIP SELF   ASSESSMENT COMMENT no       Depression Screening:  :     PHQ over the last two weeks 10/25/2016   Little interest or pleasure in doing things More than half the days   Feeling down, depressed or hopeless More than half the days   Total Score PHQ 2 4   Trouble falling or staying asleep, or sleeping too much Several days   Feeling tired or having little energy Several days   Poor appetite or overeating Not at all   Feeling bad about yourself - or that you are a failure or have let yourself or your family down More than half the days   Trouble concentrating on things such as school, work, reading or watching TV Not at all   Moving or speaking so slowly that other people could have noticed; or the opposite being so fidgety that others notice Not at all   Thoughts of being better off dead, or hurting yourself in some way Not at all   PHQ 9 Score 8   How difficult have these problems made it for you to do your work, take care of your home and get along with others Somewhat difficult       Fall Risk Assessment:  :     Fall Risk Assessment, last 12 mths 4/9/2018   Able to walk? Yes   Fall in past 12 months? Yes   Fall with injury? Yes   Number of falls in past 12 months 8 or more   Fall Risk Score 9       Abuse Screening:  :     Abuse Screening Questionnaire 4/9/2018 10/28/2014   Do you ever feel afraid of your partner? N N   Are you in a relationship with someone who physically or mentally threatens you? N N   Is it safe for you to go home? Y Y       Coordination of Care Questionnaire:  :     1) Have you been to an emergency room, urgent care clinic since your last visit? Yes [address] since your last visit? no             2) Have you seen or consulted any other health care providers outside of [address] [place] [address] since your last visit? yes Dr. Hodan Benavidez Neurology. (Include any pap smears or colon screenings in this section.)    3) Do you have an Advance Directive on file? no  Are you interested in receiving information about Advance Directives? no    Reviewed record in preparation for visit and have obtained necessary documentation. Medication reconciliation up to date and corrected with patient at this time.

## 2018-04-09 NOTE — MR AVS SNAPSHOT
[address] [place] [address] 
 
 
 [place] [address] [address] [facility] 
[phone] Patient: Chelle Talley MRN: [id number] Visit Information Date & Time Provider Department Dept. Phone Encounter #  
 4/9/2018  9:00 AM Wallace Koroma Florentino [phone] [id number] Follow-up Instructions Return in about 4 months (around 8/9/2018), or if symptoms worsen or fail to improve. Upcoming Health Maintenance Date Due DTaP/Tdap/Td series (1 - Tdap) 10/2/2006 EYE EXAM RETINAL OR DILATED Q1 4/13/2017 FOOT EXAM Q1 7/25/2017 Influenza Age 5 to Adult 8/1/2017 MICROALBUMIN Q1 10/28/2017 BREAST CANCER SCRN MAMMOGRAM 11/9/2017 LIPID PANEL Q1 3/16/2018 GLAUCOMA SCREENING Q2Y 4/13/2018 HEMOGLOBIN A1C Q6M 4/27/2018 MEDICARE YEARLY EXAM 5/25/2018 COLONOSCOPY 7/7/2020 Allergies as of 4/9/2018  Review Complete On: 4/9/2018 By: Izabela Singh MD  
  
 Severity Noted Reaction Type Reactions Deserpidine  11/24/2014   Systemic Other (comments) Puts pressure around her head like a ring Current Immunizations  Reviewed on 10/25/2016 Name Date Influenza Vaccine 11/16/2015, 11/6/2012, 11/2/2011, 10/29/2010 Influenza Vaccine (Quad) PF 10/25/2016 Pneumococcal Conjugate (PCV-13) 9/10/2015 Pneumococcal Vaccine (Unspecified Type) 10/29/2010 Td 10/1/2006 Zoster Vaccine, Live 6/10/2014 Not reviewed this visit You Were Diagnosed With   
  
 Codes Comments Type 2 diabetes mellitus with diabetic polyneuropathy, without long-term current use of insulin (HCC)    -  Primary ICD-10-CM: E11.42 
ICD-9-CM: 250.60, 357.2 Mixed hyperlipidemia     ICD-10-CM: E78.2 ICD-9-CM: 272.2 Hypothyroidism, unspecified type     ICD-10-CM: E03.9 ICD-9-CM: 244.9 Fecal smearing     ICD-10-CM: R15.1 ICD-9-CM: 787.62 Depression with anxiety     ICD-10-CM: F41.8 ICD-9-CM: 300.4 Vitals BP Pulse Temp Resp Height(growth percentile) Weight(growth percentile) 108/68 (BP 1 Location: Right arm, BP Patient Position: Sitting) 72 97.8 °F (36.6 °C) (Oral) 18 5' 3\" (1.6 m) 175 lb (79.4 kg) SpO2 BMI OB Status Smoking Status 98% 31 kg/m2 Postmenopausal Never Smoker Vitals History BMI and BSA Data Body Mass Index Body Surface Area  
 31 kg/m 2 1.88 m 2 Preferred Pharmacy Pharmacy Name Phone [facility], [address] Your Updated Medication List  
  
   
This list is accurate as of 4/9/18  9:58 AM.  Always use your most recent med list.  
  
  
  
  
 aspirin delayed-release 81 mg tablet Take 1 Tab by mouth daily. atorvastatin 40 mg tablet Commonly known as:  LIPITOR  
TAKE ONE TABLET BY MOUTH DAILY  
  
 buPROPion  mg tablet Commonly known as:  Collin Chimera Take 150 mg by mouth daily. chlorhexidine 0.12 % solution Commonly known as:  PERIDEX  
  
 fenofibric acid 135 mg capsule Commonly known as:  TRILIPIX ER  
TAKE ONE CAPSULE BY MOUTH DAILY JANUVIA 25 mg tablet Generic drug:  SITagliptin TAKE ONE TABLET BY MOUTH DAILY  
  
 levothyroxine 75 mcg tablet Commonly known as:  SYNTHROID Take 1 Tab by mouth Daily (before breakfast). MOTRIN  mg tablet Generic drug:  ibuprofen Take  by mouth. omega-3 fatty acids-vitamin e 1,000 mg Cap Commonly known as:  FISH OIL Take 1 Cap by mouth daily. omeprazole 40 mg capsule Commonly known as:  PRILOSEC Take 1 Cap by mouth daily. * venlafaxine-SR 75 mg capsule Commonly known as:  EFFEXOR-XR Take 1 Cap by mouth daily. * venlafaxine- mg capsule Commonly known as:  EFFEXOR-XR  
TAKE ONE CAPSULE BY MOUTH DAILY. Take with 75 mg capsule. vitamin D3-vitamin K2 (MK4) 1,000-100 unit-mcg Tab Take 5,000 Int'l Units by mouth daily. * Notice: This list has 2 medication(s) that are the same as other medications prescribed for you. Read the directions carefully, and ask your doctor or other care provider to review them with you. We Performed the Following AMB POC URINE, MICROALBUMIN, SEMIQUANT (3 RESULTS) [09910 CPT(R)] HEMOGLOBIN A1C WITH EAG [54293 CPT(R)] LIPID PANEL [03224 CPT(R)] METABOLIC PANEL, COMPREHENSIVE [12107 CPT(R)] TSH RFX ON ABNORMAL TO FREE T4 [[id number] Custom] Follow-up Instructions Return in about 4 months (around 8/9/2018), or if symptoms worsen or fail to improve. Patient Instructions [id number] [facility] Raymond Carroll, [address] [place] [address] 
[phone] [facility] [place] of [place] Phone: [phone] [facility] Hospital[facility]  Fecal Incontinence: Care Instructions Your Care Instructions Fecal incontinence is the loss of normal control of your bowels. You may not be able to reach the toilet in time for a bowel movement, or stool may leak from your anus. Fecal incontinence can be caused by constipation, diarrhea, or anxiety or other emotional stress. It can also result from nerve injury, muscle damage (especially from childbirth), lack of exercise, or poor diet. Treatment of fecal incontinence depends on what caused it and how bad it is. It may include changes to your diet, medicine, bowel training, or surgery. More than one treatment may be needed. Loss of bowel control can be hard to deal with. You may feel ashamed or embarrassed, and you may not want to leave the house because you fear that you might have an accident in public. But treatment can help you better control your bowels and manage your incontinence. Follow-up care is a key part of your treatment and safety.  Be sure to make and go to all appointments, and call your doctor if you are having problems. It's also a good idea to know your test results and keep a list of the medicines you take. How can you care for yourself at home? · Keep a food diary of what you eat. This will help you learn which foods make your incontinence worse. · Eat small, frequent meals. Large meals may cause diarrhea. · Avoid constipation: 
¨ Include fruits, vegetables, beans, and whole grains in your diet each day. These foods are high in fiber. ¨ Drink plenty of fluids, enough so that your urine is light yellow or clear like water. If you have kidney, heart, or liver disease and have to limit fluids, talk with your doctor before you increase the amount of fluids you drink. ¨ Get some exercise every day. Build up slowly to 30 to 60 minutes a day on 5 or more days of the week. ¨ Take a fiber supplement, such as Citrucel or Metamucil, every day if needed. Read and follow all instructions on the label. ¨ Schedule time each day for a bowel movement. Having a daily routine may help. Take your time and do not strain when having a bowel movement. · Take your medicines exactly as prescribed. Call your doctor if you think you are having a problem with your medicine. You will get more details on the specific medicines your doctor prescribes. · Do pelvic floor (Kegel) exercises, which tighten and strengthen the pelvic muscles. To do Kegel exercises: 
¨ Squeeze the same muscles you would use to stop your urine. Your belly and thighs should not move. ¨ Hold the squeeze for 3 seconds, and then relax for 3 seconds. ¨ Start with 3 seconds. Then add 1 second each week until you are able to squeeze for 10 seconds. ¨ Repeat the exercise 10 to 15 times a session. Do three or more sessions a day. When should you call for help? Call your doctor now or seek immediate medical care if: 
? · You have new or worse pain. ? · You have new or worse bleeding from the rectum. ?Watch closely for changes in your health, and be sure to contact your doctor if: 
? · You cannot pass stools or gas. ? · You do not get better as expected. Where can you learn more? Go to http://celestina-dontae.info/. Enter LAUREN in the search box to learn more about \"Fecal Incontinence: Care Instructions. \" Current as of: May 12, 2017 Content Version: 11.4 © 8519-6788 [employer]. Care instructions adapted under license by [employer] (which disclaims liability or warranty for this information). If you have questions about a medical condition or this instruction, always ask your healthcare professional. [address] any warranty or liability for your use of this information. Introducing [facility] & HEALTH SERVICES! Dear Bell Daly: Thank you for requesting a Page365 account. Our records indicate that you already have an active Page365 account. You can access your account anytime at [email or web address] Did you know that you can access your hospital and ER discharge instructions at any time in Page365? You can also review all of your test results from your hospital stay or ER visit. Additional Information If you have questions, please visit the Frequently Asked Questions section of the Page365 website at [email or web address] Remember, Page365 is NOT to be used for urgent needs. For medical emergencies, dial 911. Now available from your iPhone and Android! Please provide this summary of care documentation to your next provider. Your primary care clinician is listed as Cortney Theodore. If you have any questions after today's visit, please call [phone].

## 2018-04-09 NOTE — PATIENT INSTRUCTIONS
[facility] of [address] [facility],[address]  [address] [facility], [address] [facility]  [phone]    [facility] [place] of [address]  Phone: [phone] Count includes the [facility]  Kathleen Parker       Fecal Incontinence: Care Instructions  Your Care Instructions  Fecal incontinence is the loss of normal control of your bowels. You may not be able to reach the toilet in time for a bowel movement, or stool may leak from your anus. Fecal incontinence can be caused by constipation, diarrhea, or anxiety or other emotional stress. It can also result from nerve injury, muscle damage (especially from childbirth), lack of exercise, or poor diet. Treatment of fecal incontinence depends on what caused it and how bad it is. It may include changes to your diet, medicine, bowel training, or surgery. More than one treatment may be needed. Loss of bowel control can be hard to deal with. You may feel ashamed or embarrassed, and you may not want to leave the house because you fear that you might have an accident in public. But treatment can help you better control your bowels and manage your incontinence. Follow-up care is a key part of your treatment and safety. Be sure to make and go to all appointments, and call your doctor if you are having problems. It's also a good idea to know your test results and keep a list of the medicines you take. How can you care for yourself at home? · Keep a food diary of what you eat. This will help you learn which foods make your incontinence worse. · Eat small, frequent meals. Large meals may cause diarrhea. · Avoid constipation:  ¨ Include fruits, vegetables, beans, and whole grains in your diet each day. These foods are high in fiber. ¨ Drink plenty of fluids, enough so that your urine is light yellow or clear like water.  If you have kidney, heart, or liver disease and have to limit fluids, talk with your doctor before you increase the amount of fluids you drink. ¨ Get some exercise every day. Build up slowly to 30 to 60 minutes a day on 5 or more days of the week. ¨ Take a fiber supplement, such as Citrucel or Metamucil, every day if needed. Read and follow all instructions on the label. ¨ Schedule time each day for a bowel movement. Having a daily routine may help. Take your time and do not strain when having a bowel movement. · Take your medicines exactly as prescribed. Call your doctor if you think you are having a problem with your medicine. You will get more details on the specific medicines your doctor prescribes. · Do pelvic floor (Kegel) exercises, which tighten and strengthen the pelvic muscles. To do Kegel exercises:  ¨ Squeeze the same muscles you would use to stop your urine. Your belly and thighs should not move. ¨ Hold the squeeze for 3 seconds, and then relax for 3 seconds. ¨ Start with 3 seconds. Then add 1 second each week until you are able to squeeze for 10 seconds. ¨ Repeat the exercise 10 to 15 times a session. Do three or more sessions a day. When should you call for help? Call your doctor now or seek immediate medical care if:  ? · You have new or worse pain. ? · You have new or worse bleeding from the rectum. ? Watch closely for changes in your health, and be sure to contact your doctor if:  ? · You cannot pass stools or gas. ? · You do not get better as expected. Where can you learn more? Go to http://celestina-dontae.info/. Enter T856 in the search box to learn more about \"Fecal Incontinence: Care Instructions. \"  Current as of: May 12, 2017  Content Version: 11.4  © 6492-1755 "[employer]. Care instructions adapted under license by [employer] (which disclaims liability or warranty for this information).  If you have questions about a medical condition or this instruction, always ask your healthcare professional. Will Beard disclaims any warranty or liability for your use of this information.

## 2018-04-11 ENCOUNTER — TELEPHONE (OUTPATIENT)
Dept: FAMILY MEDICINE CLINIC | Age: 73
End: 2018-04-11

## 2018-04-11 NOTE — TELEPHONE ENCOUNTER
Pt is requesting a call back from the nurse or doctor to discuss changing her \"Junova\" medication to a less expensive medication.  Pt best contact [phone].

## 2018-04-11 NOTE — TELEPHONE ENCOUNTER
We briefly discussed the cost of her Januvia medication, but did not further address at her office visit. Please inform patient that I would like to know what her current hemoglobin A1c level is. She should have her fasting labs performed at [facility] (if she has not done already). I do have another medication in mind, but need to know her level of control in order to prescribe correct dose.

## 2018-04-12 ENCOUNTER — HOSPITAL ENCOUNTER (OUTPATIENT)
Dept: LAB | Age: 73
Discharge: HOME | End: 2018-04-12
Payer: MEDICARE

## 2018-04-12 PROCEDURE — 83036 HEMOGLOBIN GLYCOSYLATED A1C: CPT

## 2018-04-12 PROCEDURE — 80061 LIPID PANEL: CPT

## 2018-04-12 PROCEDURE — 36415 COLL VENOUS BLD VENIPUNCTURE: CPT

## 2018-04-12 PROCEDURE — 80053 COMPREHEN METABOLIC PANEL: CPT

## 2018-04-12 PROCEDURE — 84443 ASSAY THYROID STIM HORMONE: CPT

## 2018-04-13 LAB
ALBUMIN SERPL-MCNC: 4.6 G/DL (ref 3.5–4.8)
ALBUMIN/GLOB SERPL: 2.4 {RATIO} (ref 1.2–2.2)
ALP SERPL-CCNC: 62 IU/L (ref 39–117)
ALT SERPL-CCNC: 19 IU/L (ref 0–32)
AST SERPL-CCNC: 26 IU/L (ref 0–40)
BILIRUB SERPL-MCNC: 0.4 MG/DL (ref 0–1.2)
BUN SERPL-MCNC: 18 MG/DL (ref 8–27)
BUN/CREAT SERPL: 18 (ref 12–28)
CALCIUM SERPL-MCNC: 9.2 MG/DL (ref 8.7–10.3)
CHLORIDE SERPL-SCNC: 99 MMOL/L (ref 96–106)
CHOLEST SERPL-MCNC: 165 MG/DL (ref 100–199)
CO2 SERPL-SCNC: 25 MMOL/L (ref 18–29)
CREAT SERPL-MCNC: 0.99 MG/DL (ref 0.57–1)
EST. AVERAGE GLUCOSE BLD GHB EST-MCNC: 151 MG/DL
GFR SERPLBLD CREATININE-BSD FMLA CKD-EPI: 56 ML/MIN/1.73
GFR SERPLBLD CREATININE-BSD FMLA CKD-EPI: 65 ML/MIN/1.73
GLOBULIN SER CALC-MCNC: 1.9 G/DL (ref 1.5–4.5)
GLUCOSE SERPL-MCNC: 153 MG/DL (ref 65–99)
HBA1C MFR BLD: 6.9 % (ref 4.8–5.6)
HDLC SERPL-MCNC: 56 MG/DL
INTERPRETATION, 910389: NORMAL
INTERPRETATION: NORMAL
LDLC SERPL CALC-MCNC: 91 MG/DL (ref 0–99)
Lab: NORMAL
PDF IMAGE, 910387: NORMAL
POTASSIUM SERPL-SCNC: 4.2 MMOL/L (ref 3.5–5.2)
PROT SERPL-MCNC: 6.5 G/DL (ref 6–8.5)
SODIUM SERPL-SCNC: 141 MMOL/L (ref 134–144)
TRIGL SERPL-MCNC: 91 MG/DL (ref 0–149)
TSH SERPL DL<=0.005 MIU/L-ACNC: 3.12 UIU/ML (ref 0.45–4.5)
VLDLC SERPL CALC-MCNC: 18 MG/DL (ref 5–40)

## 2018-05-17 ENCOUNTER — OFFICE VISIT (OUTPATIENT)
Dept: FAMILY MEDICINE CLINIC | Age: 73
End: 2018-05-17

## 2018-05-17 VITALS
SYSTOLIC BLOOD PRESSURE: 118 MMHG | OXYGEN SATURATION: 90 % | HEIGHT: 63 IN | HEART RATE: 78 BPM | WEIGHT: 179 LBS | BODY MASS INDEX: 31.71 KG/M2 | DIASTOLIC BLOOD PRESSURE: 72 MMHG | RESPIRATION RATE: 18 BRPM | TEMPERATURE: 98 F

## 2018-05-17 DIAGNOSIS — F41.9 ANXIETY: Primary | ICD-10-CM

## 2018-05-17 DIAGNOSIS — G31.84 MILD COGNITIVE IMPAIRMENT: ICD-10-CM

## 2018-05-17 DIAGNOSIS — E11.42 TYPE 2 DIABETES MELLITUS WITH DIABETIC POLYNEUROPATHY, WITHOUT LONG-TERM CURRENT USE OF INSULIN (HCC): ICD-10-CM

## 2018-05-17 RX ORDER — BUSPIRONE HYDROCHLORIDE 10 MG/1
10 TABLET ORAL 2 TIMES DAILY
Qty: 180 TAB | Refills: 1 | Status: SHIPPED | OUTPATIENT
Start: 2018-05-17 | End: 2018-10-26 | Stop reason: SDUPTHER

## 2018-05-17 NOTE — MR AVS SNAPSHOT
[address] [facility] 
 
 
 [facility] [address] [facility] 
[phone] Patient: Nina Gaviria MRN: [id number] Visit Information Date & Time Provider Department Dept. Phone Encounter #  
 2018  2:15 PM Elva Kingston Wallace Florentino [phone] [id number] Follow-up Instructions Return in about 1 month (around 2018) for anxiety follow up or sooner as needed. Upcoming Health Maintenance Date Due DTaP/Tdap/Td series (1 - Tdap) 10/2/2006 FOOT EXAM Q1 2017 BREAST CANCER SCRN MAMMOGRAM 2017 MEDICARE YEARLY EXAM 2018 Influenza Age 5 to Adult 2018 HEMOGLOBIN A1C Q6M 10/12/2018 EYE EXAM RETINAL OR DILATED Q1 2019 MICROALBUMIN Q1 2019 LIPID PANEL Q1 2019 GLAUCOMA SCREENING Q2Y 2020 COLONOSCOPY 2020 Allergies as of 2018  Review Complete On: 2018 By: Elva Kingston MD  
  
 Severity Noted Reaction Type Reactions Deserpidine  2014   Systemic Other (comments) Puts pressure around her head like a ring Current Immunizations  Reviewed on 10/25/2016 Name Date Influenza Vaccine 2015, 2012, 2011, 10/29/2010 Influenza Vaccine (Quad) PF 10/25/2016 Pneumococcal Conjugate (PCV-13) 9/10/2015 Pneumococcal Vaccine (Unspecified Type) 10/29/2010 Td 10/1/2006 Zoster Vaccine, Live 6/10/2014 Not reviewed this visit You Were Diagnosed With   
  
 Codes Comments Anxiety    -  Primary ICD-10-CM: F41.9 ICD-9-CM: 300.00 Mild cognitive impairment     ICD-10-CM: G31.84 ICD-9-CM: 331.83 Type 2 diabetes mellitus with diabetic polyneuropathy, without long-term current use of insulin (HCC)     ICD-10-CM: E11.42 
ICD-9-CM: 250.60, 357.2 Vitals BP Pulse Temp Resp Height(growth percentile) Weight(growth percentile) 118/72 (BP 1 Location: Right arm, BP Patient Position: Sitting) 78 98 °F (36.7 °C) (Oral) 18 5' 3\" (1.6 m) 179 lb (81.2 kg) SpO2 BMI OB Status Smoking Status 90% 31.71 kg/m2 Postmenopausal Never Smoker Vitals History BMI and BSA Data Body Mass Index Body Surface Area 31.71 kg/m 2 1.9 m 2 Preferred Pharmacy Pharmacy Name Phone [facility], [address] Your Updated Medication List  
  
   
This list is accurate as of 5/17/18  3:12 PM.  Always use your most recent med list.  
  
  
  
  
 aspirin delayed-release 81 mg tablet Take 1 Tab by mouth daily. atorvastatin 40 mg tablet Commonly known as:  LIPITOR  
TAKE ONE TABLET BY MOUTH DAILY  
  
 buPROPion  mg tablet Commonly known as:  Gerilyn Benders Take 150 mg by mouth daily. busPIRone 10 mg tablet Commonly known as:  BUSPAR Take 1 Tab by mouth two (2) times a day. cpap machine kit  
by Does Not Apply route nightly. fenofibric acid 135 mg capsule Commonly known as:  TRILIPIX ER  
TAKE ONE CAPSULE BY MOUTH DAILY JANUVIA 25 mg tablet Generic drug:  SITagliptin TAKE ONE TABLET BY MOUTH DAILY  
  
 levothyroxine 75 mcg tablet Commonly known as:  SYNTHROID Take 1 Tab by mouth Daily (before breakfast). * venlafaxine-SR 75 mg capsule Commonly known as:  EFFEXOR-XR Take 1 Cap by mouth daily. * venlafaxine- mg capsule Commonly known as:  EFFEXOR-XR  
TAKE ONE CAPSULE BY MOUTH DAILY. Take with 75 mg capsule. vitamin D3-vitamin K2 (MK4) 1,000-100 unit-mcg Tab Take 5,000 Int'l Units by mouth daily. * Notice: This list has 2 medication(s) that are the same as other medications prescribed for you. Read the directions carefully, and ask your doctor or other care provider to review them with you. Prescriptions Sent to Pharmacy  Refills  
 busPIRone (BUSPAR) 10 mg tablet 1  
 Sig: Take 1 Tab by mouth two (2) times a day. Class: Normal  
 Pharmacy: [facility], [address] [facility] #: [phone] Route: Oral  
  
Follow-up Instructions Return in about 1 month (around 6/17/2018) for anxiety follow up or sooner as needed. Patient Instructions A Healthy Lifestyle: Care Instructions Your Care Instructions A healthy lifestyle can help you feel good, stay at a healthy weight, and have plenty of energy for both work and play. A healthy lifestyle is something you can share with your whole family. A healthy lifestyle also can lower your risk for serious health problems, such as high blood pressure, heart disease, and diabetes. You can follow a few steps listed below to improve your health and the health of your family. Follow-up care is a key part of your treatment and safety. Be sure to make and go to all appointments, and call your doctor if you are having problems. It's also a good idea to know your test results and keep a list of the medicines you take. How can you care for yourself at home? · Do not eat too much sugar, fat, or fast foods. You can still have dessert and treats now and then. The goal is moderation. · Start small to improve your eating habits. Pay attention to portion sizes, drink less juice and soda pop, and eat more fruits and vegetables. ¨ Eat a healthy amount of food. A 3-ounce serving of meat, for example, is about the size of a deck of cards. Fill the rest of your plate with vegetables and whole grains. ¨ Limit the amount of soda and sports drinks you have every day. Drink more water when you are thirsty. ¨ Eat at least 5 servings of fruits and vegetables every day. It may seem like a lot, but it is not hard to reach this goal. A serving or helping is 1 piece of fruit, 1 cup of vegetables, or 2 cups of leafy, raw vegetables.  Have an apple or some carrot sticks as an afternoon snack instead of a candy bar. Try to have fruits and/or vegetables at every meal. 
· Make exercise part of your daily routine. You may want to start with simple activities, such as walking, bicycling, or slow swimming. Try to be active 30 to 60 minutes every day. You do not need to do all 30 to 60 minutes all at once. For example, you can exercise 3 times a day for 10 or 20 minutes. Moderate exercise is safe for most people, but it is always a good idea to talk to your doctor before starting an exercise program. 
· Keep moving. Thomas Chavis the lawn, work in the garden, or [email or web address]. Take the stairs instead of the elevator at work. · If you smoke, quit. People who smoke have an increased risk for heart attack, stroke, cancer, and other lung illnesses. Quitting is hard, but there are ways to boost your chance of quitting tobacco for good. ¨ Use nicotine gum, patches, or lozenges. ¨ Ask your doctor about stop-smoking programs and medicines. ¨ Keep trying. In addition to reducing your risk of diseases in the future, you will notice some benefits soon after you stop using tobacco. If you have shortness of breath or asthma symptoms, they will likely get better within a few weeks after you quit. · Limit how much alcohol you drink. Moderate amounts of alcohol (up to 2 drinks a day for men, 1 drink a day for women) are okay. But drinking too much can lead to liver problems, high blood pressure, and other health problems. Family health If you have a family, there are many things you can do together to improve your health. · Eat meals together as a family as often as possible. · Eat healthy foods. This includes fruits, vegetables, lean meats and dairy, and whole grains. · Include your family in your fitness plan. Most people think of activities such as jogging or tennis as the way to fitness, but there are many ways you and your family can be more active.  Anything that makes you breathe hard and gets your heart pumping is exercise. Here are some tips: 
¨ Walk to do errands or to take your child to school or the bus. ¨ Go for a family bike ride after dinner instead of watching TV. Where can you learn more? Go to http://celestina-dontae.info/. Enter Y058 in the search box to learn more about \"A Healthy Lifestyle: Care Instructions. \" Current as of: May 12, 2017 Content Version: 11.4 © 2186-5662 [employer]. Care instructions adapted under license by [employer] (which disclaims liability or warranty for this information). If you have questions about a medical condition or this instruction, always ask your healthcare professional. [address] any warranty or liability for your use of this information. Introducing [facility]! Dear Anthony Barlow: Thank you for requesting a wiMAN account. Our records indicate that you already have an active wiMAN account. You can access your account anytime at [email or web address] Did you know that you can access your hospital and ER discharge instructions at any time in wiMAN? You can also review all of your test results from your hospital stay or ER visit. Additional Information If you have questions, please visit the Frequently Asked Questions section of the wiMAN website at [email or web address] Remember, wiMAN is NOT to be used for urgent needs. For medical emergencies, dial 911. Now available from your iPhone and Android! Please provide this summary of care documentation to your next provider. Your primary care clinician is listed as Marsha Russo. If you have any questions after today's visit, please call [phone].

## 2018-05-17 NOTE — PROGRESS NOTES
Identified pt with two pt identifiers(name and ). Chief Complaint   Patient presents with    Anxiety     patient states she sees things at night and is very scared. Patient seems to struggle with short term memory.          Health Maintenance Due   Topic    DTaP/Tdap/Td series (1 - Tdap)    FOOT EXAM Q1     BREAST CANCER SCRN MAMMOGRAM        Wt Readings from Last 3 Encounters:   18 179 lb (81.2 kg)   18 175 lb (79.4 kg)   10/18/17 183 lb (83 kg)     Temp Readings from Last 3 Encounters:   18 98 °F (36.7 °C) (Oral)   18 97.8 °F (36.6 °C) (Oral)   10/18/17 98.7 °F (37.1 °C) (Oral)     BP Readings from Last 3 Encounters:   18 118/72   18 108/68   10/18/17 150/81     Pulse Readings from Last 3 Encounters:   18 78   18 72   10/18/17 72         Learning Assessment:  :     Learning Assessment 10/28/2014   PRIMARY LEARNER Patient   HIGHEST LEVEL OF EDUCATION - PRIMARY LEARNER  GRADUATED HIGH SCHOOL OR GED   BARRIERS PRIMARY LEARNER NONE   CO-LEARNER CAREGIVER No   PRIMARY LANGUAGE ENGLISH    NEED No   LEARNER PREFERENCE PRIMARY LISTENING   LEARNING SPECIAL TOPICS no   ANSWERED BY self   RELATIONSHIP SELF   ASSESSMENT COMMENT no       Depression Screening:  :     PHQ over the last two weeks 10/25/2016   Little interest or pleasure in doing things More than half the days   Feeling down, depressed or hopeless More than half the days   Total Score PHQ 2 4   Trouble falling or staying asleep, or sleeping too much Several days   Feeling tired or having little energy Several days   Poor appetite or overeating Not at all   Feeling bad about yourself - or that you are a failure or have let yourself or your family down More than half the days   Trouble concentrating on things such as school, work, reading or watching TV Not at all   Moving or speaking so slowly that other people could have noticed; or the opposite being so fidgety that others notice Not at all Thoughts of being better off dead, or hurting yourself in some way Not at all   PHQ 9 Score 8   How difficult have these problems made it for you to do your work, take care of your home and get along with others Somewhat difficult       Fall Risk Assessment:  :     Fall Risk Assessment, last 12 mths 4/9/2018   Able to walk? Yes   Fall in past 12 months? Yes   Fall with injury? Yes   Number of falls in past 12 months 8 or more   Fall Risk Score 9       Abuse Screening:  :     Abuse Screening Questionnaire 4/9/2018 10/28/2014   Do you ever feel afraid of your partner? N N   Are you in a relationship with someone who physically or mentally threatens you? N N   Is it safe for you to go home? Y Y       Coordination of Care Questionnaire:  :     1) Have you been to an emergency room, urgent care clinic since your last visit? no   Hospitalized since your last visit? no             2) Have you seen or consulted any other health care providers outside of [facility] since your last visit? no  (Include any pap smears or colon screenings in this section.)    3) Do you have an Advance Directive on file? no  Are you interested in receiving information about Advance Directives? no    Reviewed record in preparation for visit and have obtained necessary documentation. Medication reconciliation up to date and corrected with patient at this time.

## 2018-05-17 NOTE — PROGRESS NOTES
Subjective: Neris Johnson is a 74 y.o. female here with complaint of \"anxiety and fear\". She does not leave her home much and does not interact with others. She reports that this has been at its worse for the last 6 weeks. She has previously been under the care of Psychiatry in [place], but does not wish to return. She reports compliance with Effexor therapy. She denies SI/HI. She is not current in outpatient therapy, but is agreeable to go. Current Outpatient Prescriptions   Medication Sig Dispense Refill    cpap machine kit by Does Not Apply route nightly.  buPROPion XL (WELLBUTRIN XL) 150 mg tablet Take 150 mg by mouth daily.  JANUVIA 25 mg tablet TAKE ONE TABLET BY MOUTH DAILY 90 Tab 1    atorvastatin (LIPITOR) 40 mg tablet TAKE ONE TABLET BY MOUTH DAILY 90 Tab 1    venlafaxine-SR (EFFEXOR-XR) 150 mg capsule TAKE ONE CAPSULE BY MOUTH DAILY. Take with 75 mg capsule. 90 Cap 1    fenofibric acid (TRILIPIX ER) 135 mg capsule TAKE ONE CAPSULE BY MOUTH DAILY 90 Cap 1    venlafaxine-SR (EFFEXOR-XR) 75 mg capsule Take 1 Cap by mouth daily. 90 Cap 1    levothyroxine (SYNTHROID) 75 mcg tablet Take 1 Tab by mouth Daily (before breakfast). 90 Tab 3    vitamin D3-vitamin K2, MK4, 1,000-100 unit-mcg tab Take 5,000 Int'l Units by mouth daily.  aspirin delayed-release 81 mg tablet Take 1 Tab by mouth daily. 30 Tab 3       Allergies   Allergen Reactions    Deserpidine Other (comments)     Puts pressure around her head like a ring       Past Medical History:   Diagnosis Date    Depression     Hypercholesterolemia     BERTRAM on CPAP     Dr. Freeman Every     Thyroid disease     Urinary incontinence        Social History   Substance Use Topics    Smoking status: Never Smoker    Smokeless tobacco: Never Used    Alcohol use No        Review of Systems  Pertinent items are noted in HPI.      Objective:     Visit Vitals    /72 (BP 1 Location: Right arm, BP Patient Position: Sitting)  Comment: Manual    Pulse 78    Temp 98 °F (36.7 °C) (Oral)  Comment: .  Resp 18    Ht 5' 3\" (1.6 m)    Wt 179 lb (81.2 kg)    SpO2 90%    BMI 31.71 kg/m2      General appearance - alert, well appearing, and in no distress  Eyes - pupils equal and reactive, extraocular eye movements intact, sclera anicteric  Chest - clear to auscultation, no wheezes, rales or rhonchi, symmetric air entry, no tachypnea, retractions or cyanosis  Heart - normal rate, regular rhythm, normal S1, S2, no murmurs, rubs, clicks or gallops  Mental Status -  alert, oriented to person, place, and time, normal mood, behavior, speech, dress, motor activity, and thought processes, anxious      Assessment/Plan:   Mich Faith is a 74 y.o. female seen for:     1. Anxiety: continue with Effexor and start buspirone 10 mg twice daily with titration as needed. Follow up in 1 month. - busPIRone (BUSPAR) 10 mg tablet; Take 1 Tab by mouth two (2) times a day. Dispense: 180 Tab; Refill: 1  - recommend that she start outpatient therapy and she was provided with information to area offices   - did discuss having her been seen by Psychiatry again. Her son has a Psychiatrist in [place] whom she will see about scheduling appointment with. 2. Mild cognitive impairment: following by Neurology at [facility]. Per last note received, she was started on Aricept, but unclear if she is taking at this time. She will schedule follow up with Neurology. 3. Type 2 diabetes mellitus with diabetic polyneuropathy, without long-term current use of insulin ([facility]): she did discuss that her Januvia therapy has previously had large copayment. I have asked that she contact me if this continues to be the case as we may need to change her medication. I have discussed the diagnosis with the patient and the intended plan as seen in the above orders. The patient has received an after-visit summary and questions were answered concerning future plans.  I have discussed medication side effects and warnings with the patient as well. Patient verbalizes understanding of plan of care and denies further questions or concerns at this time. Informed patient to return to the office if symptoms worsen or if new symptoms arise. Follow-up Disposition:  Return in about 1 month (around 6/17/2018) for anxiety follow up or sooner as needed.

## 2018-05-17 NOTE — LETTER
5/17/2018 3:13 PM 
 
Ms. Jonathan Richter 25 [facility] Dear Jonathan Richter: 
 
Please find your most recent results below. Results for orders placed or performed in visit on 04/09/18 HEMOGLOBIN A1C WITH EAG Result Value Ref Range Hemoglobin A1c 6.9 (H) 4.8 - 5.6 % Estimated average glucose 151 mg/dL TSH RFX ON ABNORMAL TO FREE T4 Result Value Ref Range TSH 3.120 0.450 - 4.500 uIU/mL METABOLIC PANEL, COMPREHENSIVE Result Value Ref Range Glucose 153 (H) 65 - 99 mg/dL BUN 18 8 - 27 mg/dL Creatinine 0.99 0.57 - 1.00 mg/dL GFR est non-AA 56 (L) >59 mL/min/1.73 GFR est AA 65 >59 mL/min/1.73  
 BUN/Creatinine ratio 18 12 - 28 Sodium 141 134 - 144 mmol/L Potassium 4.2 3.5 - 5.2 mmol/L Chloride 99 96 - 106 mmol/L  
 CO2 25 18 - 29 mmol/L Calcium 9.2 8.7 - 10.3 mg/dL Protein, total 6.5 6.0 - 8.5 g/dL Albumin 4.6 3.5 - 4.8 g/dL GLOBULIN, TOTAL 1.9 1.5 - 4.5 g/dL A-G Ratio 2.4 (H) 1.2 - 2.2 Bilirubin, total 0.4 0.0 - 1.2 mg/dL Alk. phosphatase 62 39 - 117 IU/L  
 AST (SGOT) 26 0 - 40 IU/L  
 ALT (SGPT) 19 0 - 32 IU/L  
LIPID PANEL Result Value Ref Range Cholesterol, total 165 100 - 199 mg/dL Triglyceride 91 0 - 149 mg/dL HDL Cholesterol 56 >39 mg/dL VLDL, calculated 18 5 - 40 mg/dL LDL, calculated 91 0 - 99 mg/dL CVD REPORT Result Value Ref Range INTERPRETATION Note PDF IMAGE Not applicable CKD REPORT Result Value Ref Range Interpretation Note DIABETES PATIENT EDUCATION Result Value Ref Range PDF Image Not applicable AMB POC URINE, MICROALBUMIN, SEMIQUANT (3 RESULTS) Result Value Ref Range ALBUMIN, URINE POC 10 Negative mg/L  
 CREATININE, URINE  mg/dL Microalbumin/creat ratio (POC) <30 <30 MG/G  
 
 
 
RECOMMENDATIONS: 
Your labs look great! Your diabetes, cholesterol, and thyroid are well controlled. Please continue with your current medications at this time. Please call me if you have any questions: [phone] Sincerely, Carey Cruz MD

## 2018-05-17 NOTE — PATIENT INSTRUCTIONS

## 2018-05-23 DIAGNOSIS — K21.9 GASTROESOPHAGEAL REFLUX DISEASE WITHOUT ESOPHAGITIS: ICD-10-CM

## 2018-05-24 RX ORDER — OMEPRAZOLE 40 MG/1
CAPSULE, DELAYED RELEASE ORAL
Qty: 90 CAP | Refills: 0 | Status: SHIPPED | OUTPATIENT
Start: 2018-05-24 | End: 2018-07-28 | Stop reason: SDUPTHER

## 2018-06-18 ENCOUNTER — OFFICE VISIT (OUTPATIENT)
Dept: FAMILY MEDICINE CLINIC | Age: 73
End: 2018-06-18

## 2018-06-18 VITALS
OXYGEN SATURATION: 98 % | SYSTOLIC BLOOD PRESSURE: 115 MMHG | WEIGHT: 176 LBS | DIASTOLIC BLOOD PRESSURE: 62 MMHG | HEART RATE: 71 BPM | HEIGHT: 63 IN | TEMPERATURE: 97.9 F | BODY MASS INDEX: 31.18 KG/M2 | RESPIRATION RATE: 18 BRPM

## 2018-06-18 DIAGNOSIS — F41.8 DEPRESSION WITH ANXIETY: Primary | ICD-10-CM

## 2018-06-18 NOTE — PROGRESS NOTES
Identified pt with two pt identifiers(name and ). Chief Complaint   Patient presents with    Follow-up     began buspar.  To see Neurology today at 4        [facility] Maintenance Due   Topic    DTaP/Tdap/Td series (1 - Tdap)    FOOT EXAM Q1     BREAST CANCER SCRN MAMMOGRAM     MEDICARE YEARLY EXAM        Wt Readings from Last 3 Encounters:   18 176 lb (79.8 kg)   18 179 lb (81.2 kg)   18 175 lb (79.4 kg)     Temp Readings from Last 3 Encounters:   18 97.9 °F (36.6 °C) (Oral)   18 98 °F (36.7 °C) (Oral)   18 97.8 °F (36.6 °C) (Oral)     BP Readings from Last 3 Encounters:   18 115/62   18 118/72   18 108/68     Pulse Readings from Last 3 Encounters:   18 71   18 78   18 72         Learning Assessment:  :     Learning Assessment 10/28/2014   PRIMARY LEARNER Patient   HIGHEST LEVEL OF EDUCATION - PRIMARY LEARNER  GRADUATED HIGH SCHOOL OR GED   BARRIERS PRIMARY LEARNER NONE   CO-LEARNER CAREGIVER No   PRIMARY LANGUAGE ENGLISH    NEED No   LEARNER PREFERENCE PRIMARY LISTENING   LEARNING SPECIAL TOPICS no   ANSWERED BY self   RELATIONSHIP SELF   ASSESSMENT COMMENT no       Depression Screening:  :     PHQ over the last two weeks 10/25/2016   Little interest or pleasure in doing things More than half the days   Feeling down, depressed or hopeless More than half the days   Total Score PHQ 2 4   Trouble falling or staying asleep, or sleeping too much Several days   Feeling tired or having little energy Several days   Poor appetite or overeating Not at all   Feeling bad about yourself - or that you are a failure or have let yourself or your family down More than half the days   Trouble concentrating on things such as school, work, reading or watching TV Not at all   Moving or speaking so slowly that other people could have noticed; or the opposite being so fidgety that others notice Not at all   Thoughts of being better off dead, or hurting yourself in some way Not at all   PHQ 9 Score 8   How difficult have these problems made it for you to do your work, take care of your home and get along with others Somewhat difficult       Fall Risk Assessment:  :     Fall Risk Assessment, last 12 mths 4/9/2018   Able to walk? Yes   Fall in past 12 months? Yes   Fall with injury? Yes   Number of falls in past 12 months 8 or more   Fall Risk Score 9       Abuse Screening:  :     Abuse Screening Questionnaire 4/9/2018 10/28/2014   Do you ever feel afraid of your partner? N N   Are you in a relationship with someone who physically or mentally threatens you? N N   Is it safe for you to go home? Y Y       Coordination of Care Questionnaire:  :     1) Have you been to an emergency room, urgent care clinic since your last visit? no   Hospitalized since your last visit? no             2) Have you seen or consulted any other health care providers outside of [facility] since your last visit? yes Dentist for tooth pull  (Include any pap smears or colon screenings in this section.)    3) Do you have an Advance Directive on file? no  Are you interested in receiving information about Advance Directives? no    Reviewed record in preparation for visit and have obtained necessary documentation. Medication reconciliation up to date and corrected with patient at this time.

## 2018-06-18 NOTE — PROGRESS NOTES
Subjective: Rahat Jose is a 74 y.o. female here for anxiety follow up. She has recently been started on Buspar therapy which she reports compliance with. She has noticed some improvement in her anxiety. She is scheduled for her first outpatient behavioral therapy session today. Denies SI/HI, irritability. Her worrying has decreased some. Current Outpatient Prescriptions   Medication Sig Dispense Refill    levothyroxine (SYNTHROID) 75 mcg tablet Take 1 Tab by mouth Daily (before breakfast). 90 Tab 1    omeprazole (PRILOSEC) 40 mg capsule Take 1 Cap by mouth daily. 90 Cap 0    buPROPion XL (WELLBUTRIN XL) 150 mg tablet Take 1 Tab by mouth daily. 90 Tab 3    venlafaxine-SR (EFFEXOR-XR) 75 mg capsule Take 1 Cap by mouth daily. 90 Cap 3    cpap machine kit by Does Not Apply route nightly.  busPIRone (BUSPAR) 10 mg tablet Take 1 Tab by mouth two (2) times a day. 180 Tab 1    JANUVIA 25 mg tablet TAKE ONE TABLET BY MOUTH DAILY 90 Tab 1    atorvastatin (LIPITOR) 40 mg tablet TAKE ONE TABLET BY MOUTH DAILY 90 Tab 1    fenofibric acid (TRILIPIX ER) 135 mg capsule TAKE ONE CAPSULE BY MOUTH DAILY 90 Cap 1    vitamin D3-vitamin K2, MK4, 1,000-100 unit-mcg tab Take 5,000 Int'l Units by mouth daily.  aspirin delayed-release 81 mg tablet Take 1 Tab by mouth daily. 30 Tab 3       Allergies   Allergen Reactions    Deserpidine Other (comments)     Puts pressure around her head like a ring       Past Medical History:   Diagnosis Date    Depression     Hypercholesterolemia     BERTRAM on CPAP     Dr. Cole Patel     Thyroid disease     Urinary incontinence        Social History   Substance Use Topics    Smoking status: Never Smoker    Smokeless tobacco: Never Used    Alcohol use No        Review of Systems  Pertinent items are noted in HPI. Objective:     Visit Vitals    /62 (BP 1 Location: Right arm, BP Patient Position: Sitting)  Comment: .     Pulse 71    Temp 97.9 °F (36.6 °C) (Oral)  Comment: .  Resp 18    Ht 5' 3\" (1.6 m)    Wt 176 lb (79.8 kg)    SpO2 98%    BMI 31.18 kg/m2      General appearance - alert, well appearing, and in no distress  Eyes - pupils equal and reactive, extraocular eye movements intact, sclera anicteric  Chest - clear to auscultation, no wheezes, rales or rhonchi, symmetric air entry, no tachypnea, retractions or cyanosis  Heart - normal rate, regular rhythm, normal S1, S2, no murmurs, rubs, clicks or gallops  Mental Status - alert, oriented to person, place, and time, normal mood, behavior, speech, dress, motor activity, and thought processes    Assessment/Plan:   Bebe Loyola is a 74 y.o. female seen for:     1. Depression with anxiety: doing well and will continue with current therapy. She has her first outpatient therapy session today and she is excited to be seen. Follow up in 3-4 months or sooner as needed. I have discussed the diagnosis with the patient and the intended plan as seen in the above orders. The patient has received an after-visit summary and questions were answered concerning future plans. I have discussed medication side effects and warnings with the patient as well. Patient verbalizes understanding of plan of care and denies further questions or concerns at this time. Informed patient to return to the office if symptoms worsen or if new symptoms arise.     Follow-up Disposition: Not on File

## 2018-08-03 ENCOUNTER — OFFICE VISIT (OUTPATIENT)
Dept: FAMILY MEDICINE CLINIC | Age: 73
End: 2018-08-03

## 2018-08-03 VITALS
HEIGHT: 63 IN | BODY MASS INDEX: 31.22 KG/M2 | OXYGEN SATURATION: 98 % | HEART RATE: 72 BPM | WEIGHT: 176.2 LBS | DIASTOLIC BLOOD PRESSURE: 62 MMHG | SYSTOLIC BLOOD PRESSURE: 116 MMHG | RESPIRATION RATE: 20 BRPM | TEMPERATURE: 97.5 F

## 2018-08-03 DIAGNOSIS — M15.9 PRIMARY OSTEOARTHRITIS INVOLVING MULTIPLE JOINTS: ICD-10-CM

## 2018-08-03 DIAGNOSIS — Z13.39 SCREENING FOR ALCOHOLISM: ICD-10-CM

## 2018-08-03 DIAGNOSIS — Z13.31 SCREENING FOR DEPRESSION: ICD-10-CM

## 2018-08-03 DIAGNOSIS — Z23 NEED FOR SHINGLES VACCINE: ICD-10-CM

## 2018-08-03 DIAGNOSIS — Z00.00 MEDICARE ANNUAL WELLNESS VISIT, SUBSEQUENT: Primary | ICD-10-CM

## 2018-08-03 RX ORDER — FOLIC ACID 1 MG/1
TABLET ORAL DAILY
COMMUNITY
End: 2019-05-08

## 2018-08-03 RX ORDER — CELECOXIB 200 MG/1
200 CAPSULE ORAL DAILY
Qty: 90 CAP | Refills: 1 | Status: SHIPPED | OUTPATIENT
Start: 2018-08-03 | End: 2019-01-24 | Stop reason: SDUPTHER

## 2018-08-03 RX ORDER — CHLORHEXIDINE GLUCONATE 1.2 MG/ML
RINSE ORAL
COMMUNITY
Start: 2018-07-19 | End: 2019-01-22

## 2018-08-03 NOTE — PATIENT INSTRUCTIONS
Medicare Wellness Visit, Female The best way to live healthy is to have a lifestyle where you eat a well-balanced diet, exercise regularly, limit alcohol use, and quit all forms of tobacco/nicotine, if applicable. Regular preventive services are another way to keep healthy. Preventive services (vaccines, screening tests, monitoring & exams) can help personalize your care plan, which helps you manage your own care. Screening tests can find health problems at the earliest stages, when they are easiest to treat. [facility] follows the current, evidence-based guidelines published by the [facility] Chidi Fernando ([facility]) when recommending preventive services for our patients. Because we follow these guidelines, sometimes recommendations change over time as research supports it. (For example, mammograms used to be recommended annually. Even though Medicare will still pay for an annual mammogram, the newer guidelines recommend a mammogram every two years for women of average risk.) Of course, you and your provider may decide to screen more often for some diseases, based on your risk and co-morbidities (chronic disease you are already diagnosed with). Preventive services for you include: - Medicare offers their members a free annual wellness visit, which is time for you and your primary care provider to discuss and plan for your preventive service needs. Take advantage of this benefit every year! 
 
-All people over age 72 should receive the recommended pneumonia vaccines. Current USPSTF guidelines recommend a series of two vaccines for the best pneumonia protection.  
 
-All adults should have a yearly flu vaccine and a tetanus vaccine every 10 years. All adults age 61 years should receive a shingles vaccine once in their lifetime.   
 
-A bone mass density test is recommended when a woman turns 65 to screen for osteoporosis.  This test is only recommended once as a screening. Some providers will use this same test as a disease monitoring tool if you already have osteoporosis. -All adults age 38-68 years who are overweight should have a diabetes screening test once every three years.  
 
-Other screening tests & preventive services for persons with diabetes include: an eye exam to screen for diabetic retinopathy, a kidney function test, a foot exam, and stricter control over your cholesterol.  
 
-Cardiovascular screening for adults with routine risk involves an electrocardiogram (ECG) at intervals determined by the provider.  
 
-Colorectal cancer screenings should be done for adults age 54-65 years with normal risk. There are a number of acceptable methods of screening for this type of cancer. Each test has its own benefits and drawbacks. Discuss with your provider what is most appropriate for you during your annual wellness visit. The different tests include: colonoscopy (considered the best screening method), a fecal occult blood test, a fecal DNA test, and sigmoidoscopy. -Breast cancer screenings are recommended every other year for women of normal risk age 54-69 years.  
 
-Cervical cancer screenings for women over age 72 are only recommended with certain risk factors.  
 
-All adults born between [facility] should be screened once for Hepatitis C. Here is a list of your current Health Maintenance items (your personalized list of preventive services) with a due date: 
Health Maintenance Due Topic Date Due  
 DTaP/Tdap/Td  (1 - Tdap) 10/02/2006 [address] [place] [address] Diabetic Foot Care  07/25/2017  Breast Cancer Screening  11/09/2017 [address] [place] [address] Annual Well Visit  05/25/2018  Flu Vaccine  08/01/2018 A Healthy Lifestyle: Care Instructions Your Care Instructions A healthy lifestyle can help you feel good, stay at a healthy weight, and have plenty of energy for both work and play. A healthy lifestyle is something you can share with your whole family.  
A healthy lifestyle also can lower your risk for serious health problems, such as high blood pressure, heart disease, and diabetes. You can follow a few steps listed below to improve your health and the health of your family. Follow-up care is a key part of your treatment and safety. Be sure to make and go to all appointments, and call your doctor if you are having problems. It's also a good idea to know your test results and keep a list of the medicines you take. How can you care for yourself at home? · Do not eat too much sugar, fat, or fast foods. You can still have dessert and treats now and then. The goal is moderation. · Start small to improve your eating habits. Pay attention to portion sizes, drink less juice and soda pop, and eat more fruits and vegetables. ¨ Eat a healthy amount of food. A 3-ounce serving of meat, for example, is about the size of a deck of cards. Fill the rest of your plate with vegetables and whole grains. ¨ Limit the amount of soda and sports drinks you have every day. Drink more water when you are thirsty. ¨ Eat at least 5 servings of fruits and vegetables every day. It may seem like a lot, but it is not hard to reach this goal. A serving or helping is 1 piece of fruit, 1 cup of vegetables, or 2 cups of leafy, raw vegetables. Have an apple or some carrot sticks as an afternoon snack instead of a candy bar. Try to have fruits and/or vegetables at every meal. 
· Make exercise part of your daily routine. You may want to start with simple activities, such as walking, bicycling, or slow swimming. Try to be active 30 to 60 minutes every day. You do not need to do all 30 to 60 minutes all at once. For example, you can exercise 3 times a day for 10 or 20 minutes. Moderate exercise is safe for most people, but it is always a good idea to talk to your doctor before starting an exercise program. 
· Keep moving. Oli Leap the lawn, work in the garden, or [employer].  Take the stairs instead of the elevator at work. · If you smoke, quit. People who smoke have an increased risk for heart attack, stroke, cancer, and other lung illnesses. Quitting is hard, but there are ways to boost your chance of quitting tobacco for good. ¨ Use nicotine gum, patches, or lozenges. ¨ Ask your doctor about stop-smoking programs and medicines. ¨ Keep trying. In addition to reducing your risk of diseases in the future, you will notice some benefits soon after you stop using tobacco. If you have shortness of breath or asthma symptoms, they will likely get better within a few weeks after you quit. · Limit how much alcohol you drink. Moderate amounts of alcohol (up to 2 drinks a day for men, 1 drink a day for women) are okay. But drinking too much can lead to liver problems, high blood pressure, and other health problems. Family health If you have a family, there are many things you can do together to improve your health. · Eat meals together as a family as often as possible. · Eat healthy foods. This includes fruits, vegetables, lean meats and dairy, and whole grains. · Include your family in your fitness plan. Most people think of activities such as jogging or tennis as the way to fitness, but there are many ways you and your family can be more active. Anything that makes you breathe hard and gets your heart pumping is exercise. Here are some tips: 
¨ Walk to do errands or to take your child to school or the bus. ¨ Go for a family bike ride after dinner instead of watching TV. Where can you learn more? Go to http://celestina-dontae.info/. Enter G937 in the search box to learn more about \"A Healthy Lifestyle: Care Instructions. \" Current as of: December 7, 2017 Content Version: 11.7 © 2756-4924 Healthwise, Incorporated. Care instructions adapted under license by [employer] (which disclaims liability or warranty for this information).  If you have questions about a medical condition or this instruction, always ask your healthcare professional. [address] any warranty or liability for your use of this information. Preventing Falls: Care Instructions Your Care Instructions Getting around your home safely can be a challenge if you have injuries or health problems that make it easy for you to fall. Loose rugs and furniture in walkways are among the dangers for many older people who have problems walking or who have poor eyesight. People who have conditions such as arthritis, osteoporosis, or dementia also have to be careful not to fall. You can make your home safer with a few simple measures. Follow-up care is a key part of your treatment and safety. Be sure to make and go to all appointments, and call your doctor if you are having problems. It's also a good idea to know your test results and keep a list of the medicines you take. How can you care for yourself at home? Taking care of yourself · You may get dizzy if you do not drink enough water. To prevent dehydration, drink plenty of fluids, enough so that your urine is light yellow or clear like water. Choose water and other caffeine-free clear liquids. If you have kidney, heart, or liver disease and have to limit fluids, talk with your doctor before you increase the amount of fluids you drink. · Exercise regularly to improve your strength, muscle tone, and balance. Walk if you can. Swimming may be a good choice if you cannot walk easily. · Have your vision and hearing checked each year or any time you notice a change. If you have trouble seeing and hearing, you might not be able to avoid objects and could lose your balance. · Know the side effects of the medicines you take. Ask your doctor or pharmacist whether the medicines you take can affect your balance. Sleeping pills or sedatives can affect your balance. · Limit the amount of alcohol you drink. Alcohol can impair your balance and other senses.  
· Ask your doctor whether calluses or corns on your feet need to be removed. If you wear loose-fitting shoes because of calluses or corns, you can lose your balance and fall. · Talk to your doctor if you have numbness in your feet. Preventing falls at home · Remove raised doorway thresholds, throw rugs, and clutter. Repair loose carpet or raised areas in the floor. · Move furniture and electrical cords to keep them out of walking paths. · Use nonskid floor wax, and wipe up spills right away, especially on ceramic tile floors. · If you use a walker or cane, put rubber tips on it. If you use crutches, clean the bottoms of them regularly with an abrasive pad, such as steel wool. · Keep your house well lit, especially Tivis Gaudier, and outside walkways. Use night-lights in areas such as hallways and bathrooms. Add extra light switches or use remote switches (such as switches that go on or off when you clap your hands) to make it easier to turn lights on if you have to get up during the night. · Install sturdy handrails on stairways. · Move items in your cabinets so that the things you use a lot are on the lower shelves (about waist level). · Keep a cordless phone and a flashlight with new batteries by your bed. If possible, put a phone in each of the main rooms of your house, or carry a cell phone in case you fall and cannot reach a phone. Or, you can wear a device around your neck or wrist. You push a button that sends a signal for help. · Wear low-heeled shoes that fit well and give your feet good support. Use footwear with nonskid soles. Check the heels and soles of your shoes for wear. Repair or replace worn heels or soles. · Do not wear socks without shoes on wood floors. · Walk on the grass when the sidewalks are slippery. If you live in an area that gets snow and ice in the winter, sprinkle salt on slippery steps and sidewalks. Preventing falls in the bath · Install grab bars and nonskid mats inside and outside your shower or tub and near the toilet and sinks. · Use shower chairs and bath benches. · Use a hand-held shower head that will allow you to sit while showering. · Get into a tub or shower by putting the weaker leg in first. Get out of a tub or shower with your strong side first. 
· Repair loose toilet seats and consider installing a raised toilet seat to make getting on and off the toilet easier. · Keep your bathroom door unlocked while you are in the shower. Where can you learn more? Go to http://celestina-dontae.info/. Enter [id number] [id number] in the search box to learn more about \"Preventing Falls: Care Instructions. \" Current as of: May 12, 2017 Content Version: 11.7 © 2699-9355 [employer]. Care instructions adapted under license by [employer] (which disclaims liability or warranty for this information). If you have questions about a medical condition or this instruction, always ask your healthcare professional. [address] any warranty or liability for your use of this information.

## 2018-08-03 NOTE — PROGRESS NOTES
This is the Subsequent Medicare Annual Wellness Exam, performed 12 months or more after the Initial AWV or the last Subsequent AWV I have reviewed the patient's medical history in detail and updated the computerized patient record. History Patient here today for AWV. She also has known osteoarthritis of the knees which have been causing her discomfort of the last few weeks. No known injury or trauma. Has been taking OTC ibuprofen without much relief. Past Medical History:  
Diagnosis Date  Depression  Hypercholesterolemia  BERTRAM on CPAP Dr. Carlos Rosales  Thyroid disease  Urinary incontinence Past Surgical History:  
Procedure Laterality Date [address] [place] UNLISTED  HX COLONOSCOPY    
 HX ENDOSCOPY    
 HX GYN    
 HX HEENT    
 HX ORTHOPAEDIC Current Outpatient Prescriptions Medication Sig Dispense Refill  folic acid (FOLVITE) 1 mg tablet Take  by mouth daily.  varicella-zoster recombinant, PF, (SHINGRIX, PF,) 50 mcg/0.5 mL susr injection 0.5mL by IntraMUSCular route once now and then repeat in 2-6 months 0.5 mL 1  
 atorvastatin (LIPITOR) 40 mg tablet TAKE ONE TABLET BY MOUTH DAILY 90 Tab 1  venlafaxine-SR (EFFEXOR-XR) 150 mg capsule TAKE ONE CAPSULE BY MOUTH DAILY. Take with 75 mg capsule. 90 Cap 1  
 fenofibric acid (TRILIPIX ER) 135 mg capsule TAKE ONE CAPSULE BY MOUTH DAILY 90 Cap 1  
 levothyroxine (SYNTHROID) 75 mcg tablet Take 1 Tab by mouth Daily (before breakfast). 90 Tab 1  
 omeprazole (PRILOSEC) 40 mg capsule Take 1 Cap by mouth daily. 90 Cap 0  
 buPROPion XL (WELLBUTRIN XL) 150 mg tablet Take 1 Tab by mouth daily. 90 Tab 3  
 venlafaxine-SR (EFFEXOR-XR) 75 mg capsule Take 1 Cap by mouth daily. 90 Cap 3  cpap machine kit by Does Not Apply route nightly.  busPIRone (BUSPAR) 10 mg tablet Take 1 Tab by mouth two (2) times a day. 180 Tab 1  JANUVIA 25 mg tablet TAKE ONE TABLET BY MOUTH DAILY 90 Tab 1  vitamin D3-vitamin K2, MK4, 1,000-100 unit-mcg tab Take 5,000 Int'l Units by mouth daily.  aspirin delayed-release 81 mg tablet Take 1 Tab by mouth daily. 30 Tab 3  chlorhexidine (PERIDEX) 0.12 % solution Allergies Allergen Reactions  Gabapentin Drowsiness  Deserpidine Other (comments) Puts pressure around her head like a ring Family History Problem Relation Age of Onset  No Known Problems Mother  No Known Problems Father Social History Substance Use Topics  Smoking status: Never Smoker  Smokeless tobacco: Never Used  Alcohol use No  
 
Patient Active Problem List  
Diagnosis Code  Hypothyroid E03.9  
 HLD (hyperlipidemia) E78.5  Depression F32.9  Anxiety F41.9  
 Incontinence of urine in female R32  
 OA (osteoarthritis) M19.90  Type II or unspecified type diabetes mellitus without mention of complication, not stated as uncontrolled E11.9  BERTRAM on CPAP G47.33, Z99.89 Depression Risk Factor Screening: PHQ over the last two weeks 10/25/2016 Little interest or pleasure in doing things More than half the days Feeling down, depressed, irritable, or hopeless More than half the days Total Score PHQ 2 4 Trouble falling or staying asleep, or sleeping too much Several days Feeling tired or having little energy Several days Poor appetite, weight loss, or overeating Not at all Feeling bad about yourself - or that you are a failure or have let yourself or your family down More than half the days Trouble concentrating on things such as school, work, reading, or watching TV Not at all Moving or speaking so slowly that other people could have noticed; or the opposite being so fidgety that others notice Not at all Thoughts of being better off dead, or hurting yourself in some way Not at all PHQ 9 Score 8 How difficult have these problems made it for you to do your work, take care of your home and get along with others Somewhat difficult Alcohol Risk Factor Screening: You do not drink alcohol or very rarely. Functional Ability and Level of Safety:  
Hearing Loss Hearing is fair. She has had evaluation for hearing loss and does have hearing aids at home. Activities of Daily Living The home contains: grab bars in the shower, shower chair Patient needs help with:  transportation and shopping Fall Risk Fall Risk Assessment, last 12 mths 8/3/2018 Able to walk? Yes Fall in past 12 months? Yes Fall with injury? Yes  
Number of falls in past 12 months 3 Fall Risk Score 4 Abuse Screen Patient is not abused Cognitive Screening Evaluation of Cognitive Function: 
Has your family/caregiver stated any concerns about your memory: yes Normal 
 
Patient Care Team  
Patient Care Team: 
Dominic Rosas MD as PCP - [facility]) Aston Christianson MD as Physician (Nephrology) Elvie Almaraz RN as Nurse Navigator Kai Herrera MD as Physician (Neurology) [place] as Physician (Optometry) Assessment/Plan Education and counseling provided: 
Influenza Vaccine Screening Mammography Diagnoses and all orders for this visit: 
 
1. Medicare annual wellness visit, subsequent 2. Need for shingles vaccine 
-     varicella-zoster recombinant, PF, (SHINGRIX, PF,) 50 mcg/0.5 mL susr injection; 0.5mL by IntraMUSCular route once now and then repeat in 2-6 months 3. Screening for alcoholism 4. Screening for depression -     Depression Screen Annual 
 
5. Primary osteoarthritis involving multiple joints 
-     celecoxib (CELEBREX) 200 mg capsule; Take 1 Cap by mouth daily. Indications: OSTEOARTHRITIS Health Maintenance Due Topic Date Due  
 DTaP/Tdap/Td series (1 - Tdap) 10/02/2006  
 FOOT EXAM Q1  07/25/2017  BREAST CANCER SCRN MAMMOGRAM  11/09/2017  MEDICARE YEARLY EXAM  05/25/2018  Influenza Age 5 to Adult  08/01/2018

## 2018-08-03 NOTE — PROGRESS NOTES
Identified pt with two pt identifiers(name and ). Chief Complaint Patient presents with [facility] Annual Wellness Visit Medicare wellness visit Health Maintenance Due Topic  DTaP/Tdap/Td series (1 - Tdap)  FOOT EXAM Q1   
 BREAST CANCER SCRN MAMMOGRAM   
 MEDICARE YEARLY EXAM   
 Influenza Age 5 to Adult Wt Readings from Last 3 Encounters:  
18 176 lb (79.8 kg) 18 179 lb (81.2 kg) 18 175 lb (79.4 kg) Temp Readings from Last 3 Encounters:  
18 97.9 °F (36.6 °C) (Oral) 18 98 °F (36.7 °C) (Oral) 18 97.8 °F (36.6 °C) (Oral) BP Readings from Last 3 Encounters:  
18 115/62  
18 118/72  
18 108/68 Pulse Readings from Last 3 Encounters:  
18 71  
18 78  
18 72 Learning Assessment: 
:  
 
Learning Assessment 10/28/2014 PRIMARY LEARNER Patient HIGHEST LEVEL OF EDUCATION - PRIMARY LEARNER  GRADUATED HIGH SCHOOL OR GED  
BARRIERS PRIMARY LEARNER NONE  
CO-LEARNER CAREGIVER No  
PRIMARY LANGUAGE ENGLISH  NEED No  
LEARNER PREFERENCE PRIMARY LISTENING  
LEARNING SPECIAL TOPICS no  
ANSWERED BY self RELATIONSHIP SELF  
ASSESSMENT COMMENT no  
 
 
Depression Screening: 
:  
 
PHQ over the last two weeks 10/25/2016 Little interest or pleasure in doing things More than half the days Feeling down, depressed, irritable, or hopeless More than half the days Total Score PHQ 2 4 Trouble falling or staying asleep, or sleeping too much Several days Feeling tired or having little energy Several days Poor appetite, weight loss, or overeating Not at all Feeling bad about yourself - or that you are a failure or have let yourself or your family down More than half the days Trouble concentrating on things such as school, work, reading, or watching TV Not at all Moving or speaking so slowly that other people could have noticed; or the opposite being so fidgety that others notice Not at all Thoughts of being better off dead, or hurting yourself in some way Not at all PHQ 9 Score 8 How difficult have these problems made it for you to do your work, take care of your home and get along with others Somewhat difficult Fall Risk Assessment: 
:  
 
Fall Risk Assessment, last 12 mths 8/3/2018 Able to walk? Yes Fall in past 12 months? Yes Fall with injury? Yes  
Number of falls in past 12 months 3 Fall Risk Score 4 Abuse Screening: 
:  
 
Abuse Screening Questionnaire 4/9/2018 10/28/2014 Do you ever feel afraid of your partner? Johny Muck Are you in a relationship with someone who physically or mentally threatens you? Johny Arreguin Is it safe for you to go home? Romain Watson Coordination of Care Questionnaire: 
:  
 
1) Have you been to an emergency room, urgent care clinic since your last visit? no  
Hospitalized since your last visit? no          
 
2) Have you seen or consulted any other health care providers outside of [address] [place] [address] since your last visit? yes  (Include any pap smears or colon screenings in this section.) 3) Do you have an Advance Directive on file? no 
Are you interested in receiving information about Advance Directives? no 
 
Reviewed record in preparation for visit and have obtained necessary documentation. Medication reconciliation up to date and corrected with patient at this time. Presents for wellness visit; however, complains of right leg pain and limps.

## 2018-08-03 NOTE — ACP (ADVANCE CARE PLANNING)
Advance Care Planning (ACP) Provider Conversation Snapshot    Date of ACP Conversation: 08/03/18  Persons included in Conversation:  patient  Length of ACP Conversation in minutes:  <16 minutes (Non-Billable)    Authorized Decision Maker (if patient is incapable of making informed decisions): This person is: Other Legally Authorized Decision Maker (e.g. Next of Kin)          For Patients with Decision Making Capacity:   Values/Goals: Exploration of values, goals, and preferences if recovery is not expected, even with continued medical treatment in the event of:  Imminent death  Severe, permanent brain injury  \"In these circumstances, what matters most to you? \"  Care focused more on comfort or quality of life.     Conversation Outcomes / Follow-Up Plan:   Recommended communicating the plan and making copies for the healthcare agent, personal physician, and others as appropriate (e.g., health system)  Recommended review of completed ACP document annually or upon change in health status

## 2018-10-26 DIAGNOSIS — F41.9 ANXIETY: ICD-10-CM

## 2018-10-26 RX ORDER — BUSPIRONE HYDROCHLORIDE 10 MG/1
10 TABLET ORAL 2 TIMES DAILY
Qty: 180 TAB | Refills: 1 | Status: SHIPPED | OUTPATIENT
Start: 2018-10-26 | End: 2019-09-23 | Stop reason: SDUPTHER

## 2018-11-25 DIAGNOSIS — E03.9 HYPOTHYROIDISM, UNSPECIFIED TYPE: ICD-10-CM

## 2018-11-28 RX ORDER — LEVOTHYROXINE SODIUM 75 UG/1
TABLET ORAL
Qty: 90 TAB | Refills: 1 | Status: SHIPPED | OUTPATIENT
Start: 2018-11-28 | End: 2019-08-02 | Stop reason: SDUPTHER

## 2019-01-22 ENCOUNTER — HOSPITAL ENCOUNTER (OUTPATIENT)
Dept: LAB | Age: 74
Discharge: HOME | End: 2019-01-22
Payer: MEDICARE

## 2019-01-22 ENCOUNTER — OFFICE VISIT (OUTPATIENT)
Dept: FAMILY MEDICINE CLINIC | Age: 74
End: 2019-01-22

## 2019-01-22 VITALS
SYSTOLIC BLOOD PRESSURE: 122 MMHG | TEMPERATURE: 98.1 F | BODY MASS INDEX: 30.65 KG/M2 | HEIGHT: 63 IN | RESPIRATION RATE: 18 BRPM | HEART RATE: 64 BPM | DIASTOLIC BLOOD PRESSURE: 78 MMHG | OXYGEN SATURATION: 98 % | WEIGHT: 173 LBS

## 2019-01-22 DIAGNOSIS — E11.8 TYPE 2 DIABETES MELLITUS WITH COMPLICATION, WITHOUT LONG-TERM CURRENT USE OF INSULIN (HCC): Primary | ICD-10-CM

## 2019-01-22 DIAGNOSIS — F32.A DEPRESSION, UNSPECIFIED DEPRESSION TYPE: ICD-10-CM

## 2019-01-22 DIAGNOSIS — R29.6 FREQUENT FALLS: ICD-10-CM

## 2019-01-22 DIAGNOSIS — E03.9 HYPOTHYROIDISM, UNSPECIFIED TYPE: ICD-10-CM

## 2019-01-22 DIAGNOSIS — R07.9 CHEST PAIN, UNSPECIFIED TYPE: ICD-10-CM

## 2019-01-22 DIAGNOSIS — E55.9 VITAMIN D DEFICIENCY: ICD-10-CM

## 2019-01-22 DIAGNOSIS — E78.2 MIXED HYPERLIPIDEMIA: ICD-10-CM

## 2019-01-22 DIAGNOSIS — R68.89 OTHER GENERAL SYMPTOMS AND SIGNS: ICD-10-CM

## 2019-01-22 DIAGNOSIS — R41.3 SHORT-TERM MEMORY LOSS: ICD-10-CM

## 2019-01-22 PROCEDURE — 80053 COMPREHEN METABOLIC PANEL: CPT

## 2019-01-22 PROCEDURE — 84443 ASSAY THYROID STIM HORMONE: CPT

## 2019-01-22 PROCEDURE — 36415 COLL VENOUS BLD VENIPUNCTURE: CPT

## 2019-01-22 PROCEDURE — 85025 COMPLETE CBC W/AUTO DIFF WBC: CPT

## 2019-01-22 PROCEDURE — 82607 VITAMIN B-12: CPT

## 2019-01-22 PROCEDURE — 83036 HEMOGLOBIN GLYCOSYLATED A1C: CPT

## 2019-01-22 PROCEDURE — 82306 VITAMIN D 25 HYDROXY: CPT

## 2019-01-22 RX ORDER — VENLAFAXINE HYDROCHLORIDE 150 MG/1
CAPSULE, EXTENDED RELEASE ORAL
Qty: 90 CAP | Refills: 1 | Status: SHIPPED | OUTPATIENT
Start: 2019-01-22 | End: 2019-07-20 | Stop reason: SDUPTHER

## 2019-01-22 RX ORDER — VENLAFAXINE HYDROCHLORIDE 75 MG/1
CAPSULE, EXTENDED RELEASE ORAL
Qty: 90 CAP | Refills: 1 | Status: SHIPPED | OUTPATIENT
Start: 2019-01-22 | End: 2019-09-23 | Stop reason: SDUPTHER

## 2019-01-22 RX ORDER — FENOFIBRIC ACID 135 MG/1
CAPSULE, DELAYED RELEASE ORAL
Qty: 90 CAP | Refills: 1 | Status: SHIPPED | OUTPATIENT
Start: 2019-01-22 | End: 2019-07-20 | Stop reason: SDUPTHER

## 2019-01-22 RX ORDER — ATORVASTATIN CALCIUM 40 MG/1
TABLET, FILM COATED ORAL
Qty: 90 TAB | Refills: 1 | Status: SHIPPED | OUTPATIENT
Start: 2019-01-22 | End: 2019-07-20 | Stop reason: SDUPTHER

## 2019-01-22 NOTE — PROGRESS NOTES
Identified pt with two pt identifiers(name and ). Chief Complaint Patient presents with  Tingling  
  in both legs began about 6 months ago.  Chest Pain  
  began about 2 months ago. patient describes it as a sharp pain that is on differnet spots on her chest. and into her shoulder  Breathing Problem  
  has trouble doing activites. Health Maintenance Due Topic  DTaP/Tdap/Td series (1 - Tdap)  FOOT EXAM Q1   
 BREAST CANCER SCRN MAMMOGRAM   
 Influenza Age 5 to Adult  Shingrix Vaccine Age 50> (2 of 2)  HEMOGLOBIN A1C Q6M Wt Readings from Last 3 Encounters:  
19 173 lb (78.5 kg) 18 176 lb 3.2 oz (79.9 kg) 18 176 lb (79.8 kg) Temp Readings from Last 3 Encounters:  
19 98.1 °F (36.7 °C) (Oral) 18 97.5 °F (36.4 °C) (Oral) 18 97.9 °F (36.6 °C) (Oral) BP Readings from Last 3 Encounters:  
19 122/78  
18 116/62  
18 115/62 Pulse Readings from Last 3 Encounters:  
19 64  
18 72  
18 71 Learning Assessment: 
:  
 
Learning Assessment 10/28/2014 PRIMARY LEARNER Patient HIGHEST LEVEL OF EDUCATION - PRIMARY LEARNER  GRADUATED HIGH SCHOOL OR GED  
BARRIERS PRIMARY LEARNER NONE  
CO-LEARNER CAREGIVER No  
PRIMARY LANGUAGE ENGLISH  NEED No  
LEARNER PREFERENCE PRIMARY LISTENING  
LEARNING SPECIAL TOPICS no  
ANSWERED BY self RELATIONSHIP SELF  
ASSESSMENT COMMENT no  
 
 
Depression Screening: 
:  
 
PHQ over the last two weeks 2019 Little interest or pleasure in doing things Not at all Feeling down, depressed, irritable, or hopeless Not at all Total Score PHQ 2 0 Trouble falling or staying asleep, or sleeping too much - Feeling tired or having little energy - Poor appetite, weight loss, or overeating - Feeling bad about yourself - or that you are a failure or have let yourself or your family down -  
 Trouble concentrating on things such as school, work, reading, or watching TV - Moving or speaking so slowly that other people could have noticed; or the opposite being so fidgety that others notice - Thoughts of being better off dead, or hurting yourself in some way -  
PHQ 9 Score - How difficult have these problems made it for you to do your work, take care of your home and get along with others - Fall Risk Assessment: 
:  
 
Fall Risk Assessment, last 12 mths 1/22/2019 Able to walk? Yes Fall in past 12 months? Yes Fall with injury? No  
Number of falls in past 12 months 6 Fall Risk Score 6 Abuse Screening: 
:  
 
Abuse Screening Questionnaire 1/22/2019 4/9/2018 10/28/2014 Do you ever feel afraid of your partner? N N N Are you in a relationship with someone who physically or mentally threatens you? N N N Is it safe for you to go home? Rosita Nash Coordination of Care Questionnaire: 
:  
 
1) Have you been to an emergency room, urgent care clinic since your last visit? no  
Hospitalized since your last visit? no          
 
2) Have you seen or consulted any other health care providers outside of [address] [place] [address] since your last visit? no  (Include any pap smears or colon screenings in this section.) 3) Do you have an Advance Directive on file? no 
Are you interested in receiving information about Advance Directives? no 
 
Patient is accompanied by son I have received verbal consent from Nina Gaviria to discuss any/all medical information while they are present in the room. Reviewed record in preparation for visit and have obtained necessary documentation. Medication reconciliation up to date and corrected with patient at this time.

## 2019-01-22 NOTE — PATIENT INSTRUCTIONS
[facility] location [address] Phone: [phone] Fax: [phone] Preventing Falls: Care Instructions Your Care Instructions Getting around your home safely can be a challenge if you have injuries or health problems that make it easy for you to fall. Loose rugs and furniture in walkways are among the dangers for many older people who have problems walking or who have poor eyesight. People who have conditions such as arthritis, osteoporosis, or dementia also have to be careful not to fall. You can make your home safer with a few simple measures. Follow-up care is a key part of your treatment and safety. Be sure to make and go to all appointments, and call your doctor if you are having problems. It's also a good idea to know your test results and keep a list of the medicines you take. How can you care for yourself at home? Taking care of yourself · You may get dizzy if you do not drink enough water. To prevent dehydration, drink plenty of fluids, enough so that your urine is light yellow or clear like water. Choose water and other caffeine-free clear liquids. If you have kidney, heart, or liver disease and have to limit fluids, talk with your doctor before you increase the amount of fluids you drink. · Exercise regularly to improve your strength, muscle tone, and balance. Walk if you can. Swimming may be a good choice if you cannot walk easily. · Have your vision and hearing checked each year or any time you notice a change. If you have trouble seeing and hearing, you might not be able to avoid objects and could lose your balance. · Know the side effects of the medicines you take. Ask your doctor or pharmacist whether the medicines you take can affect your balance. Sleeping pills or sedatives can affect your balance. · Limit the amount of alcohol you drink. Alcohol can impair your balance and other senses. · Ask your doctor whether calluses or corns on your feet need to be removed. If you wear loose-fitting shoes because of calluses or corns, you can lose your balance and fall. · Talk to your doctor if you have numbness in your feet. Preventing falls at home · Remove raised doorway thresholds, throw rugs, and clutter. Repair loose carpet or raised areas in the floor. · Move furniture and electrical cords to keep them out of walking paths. · Use nonskid floor wax, and wipe up spills right away, especially on ceramic tile floors. · If you use a walker or cane, put rubber tips on it. If you use crutches, clean the bottoms of them regularly with an abrasive pad, such as steel wool. · Keep your house well lit, especially Jovani Zachary, and outside walkways. Use night-lights in areas such as hallways and bathrooms. Add extra light switches or use remote switches (such as switches that go on or off when you clap your hands) to make it easier to turn lights on if you have to get up during the night. · Install sturdy handrails on stairways. · Move items in your cabinets so that the things you use a lot are on the lower shelves (about waist level). · Keep a cordless phone and a flashlight with new batteries by your bed. If possible, put a phone in each of the main rooms of your house, or carry a cell phone in case you fall and cannot reach a phone. Or, you can wear a device around your neck or wrist. You push a button that sends a signal for help. · Wear low-heeled shoes that fit well and give your feet good support. Use footwear with nonskid soles. Check the heels and soles of your shoes for wear. Repair or replace worn heels or soles. · Do not wear socks without shoes on wood floors. · Walk on the grass when the sidewalks are slippery. If you live in an area that gets snow and ice in the winter, sprinkle salt on slippery steps and sidewalks. Preventing falls in the bath · Install grab bars and nonskid mats inside and outside your shower or tub and near the toilet and sinks. · Use shower chairs and bath benches. · Use a hand-held shower head that will allow you to sit while showering. · Get into a tub or shower by putting the weaker leg in first. Get out of a tub or shower with your strong side first. 
· Repair loose toilet seats and consider installing a raised toilet seat to make getting on and off the toilet easier. · Keep your bathroom door unlocked while you are in the shower. Where can you learn more? Go to http://celestina-dontae.info/. Enter [id number] in the search box to learn more about \"Preventing Falls: Care Instructions. \" Current as of: March 15, 2018 Content Version: 11.9 © 2061-1454 [employer]. Care instructions adapted under license by [employer] (which disclaims liability or warranty for this information). If you have questions about a medical condition or this instruction, always ask your healthcare professional. [address] any warranty or liability for your use of this information.

## 2019-01-22 NOTE — PROGRESS NOTES
Subjective: Randee Sampson is a 74 y.o. female here today with her son for follow up. Last seen August 2018 for [facility]. Current concerns: - Intermittent chest discomfort: has been off-and-on for 2 months. Located beneath the left breast. Described as a sharp pain which is more noticeable at night. Exacerbated with \"things going on at home\". Relationship with her adult son is intermittently strained. No associated nausea, vomiting, diaphoresis, acid reflux, dyspnea. Currently denies pain. - Short term memory loss: has been evaluated by Neurology at [facility] in [place], [place]. Believes she was last evaluated in late 2018. Unable to recall what workup has been done. Had normal brain MRI in 2015. Her son feels as though her memory is worsening and expresses frustration that no other providers have been listening. Diabetes mellitus: - Home BG monitoring: fasting in AM and at night; unable to recall her values - Hypoglycemia: none reported  
- Has neuropathy in the feet and legs, has had EMG studies with Neurology - Last eye examination: 9/2018 with Dr. Emma Stringer and reports that she have a change in her lens prescription. Depression and Anxiety: compliant with medications. Unable to verbalize if she feels as though medications are helpful. Does have significant stressors at home. Previously managed by Psychiatry. Not currently in therapy. Social support: son, family, friends. Denies SI/HI. Frequent Falls: reports 6 falls in the last 12 months. Feels as though she does not  her feet enough when she walks. No use of cane or walker. Hypothyroidism: compliant with levothyroxine therapy. Denies fatigue, temperature intolerance, bowel changes. Current Outpatient Medications Medication Sig Dispense Refill  levothyroxine (SYNTHROID) 75 mcg tablet Take 1 Tab by mouth Daily (before breakfast).  90 Tab 1  
  busPIRone (BUSPAR) 10 mg tablet Take 1 Tab by mouth two (2) times a day. 180 Tab 1  JANUVIA 25 mg tablet TAKE ONE TABLET BY MOUTH DAILY 90 Tab 1  
 omeprazole (PRILOSEC) 40 mg capsule Take 1 Cap by mouth daily. 90 Cap 1  
 folic acid (FOLVITE) 1 mg tablet Take  by mouth daily.  celecoxib (CELEBREX) 200 mg capsule Take 1 Cap by mouth daily. Indications: OSTEOARTHRITIS 90 Cap 1  
 atorvastatin (LIPITOR) 40 mg tablet TAKE ONE TABLET BY MOUTH DAILY 90 Tab 1  venlafaxine-SR (EFFEXOR-XR) 150 mg capsule TAKE ONE CAPSULE BY MOUTH DAILY. Take with 75 mg capsule. 90 Cap 1  
 fenofibric acid (TRILIPIX ER) 135 mg capsule TAKE ONE CAPSULE BY MOUTH DAILY 90 Cap 1  
 buPROPion XL (WELLBUTRIN XL) 150 mg tablet Take 1 Tab by mouth daily. 90 Tab 3  
 venlafaxine-SR (EFFEXOR-XR) 75 mg capsule Take 1 Cap by mouth daily. 90 Cap 3  cpap machine kit by Does Not Apply route nightly.  vitamin D3-vitamin K2, MK4, 1,000-100 unit-mcg tab Take 5,000 Int'l Units by mouth daily.  aspirin delayed-release 81 mg tablet Take 1 Tab by mouth daily. 30 Tab 3 Allergies Allergen Reactions  Gabapentin Drowsiness  Deserpidine Other (comments) Puts pressure around her head like a ring Past Medical History:  
Diagnosis Date  Depression  Hypercholesterolemia  BERTRAM on CPAP Dr. Matamoros Nail  Thyroid disease  Urinary incontinence Social History Tobacco Use  Smoking status: Never Smoker  Smokeless tobacco: Never Used Substance Use Topics  Alcohol use: No  
  
 
Review of Systems Pertinent items are noted in HPI. Objective:  
 
Visit Vitals /78 (BP 1 Location: Right arm, BP Patient Position: Sitting) Comment: Manual  
Pulse 64 Temp 98.1 °F (36.7 °C) (Oral) Comment: . Resp 18 Ht 5' 3\" (1.6 m) Wt 173 lb (78.5 kg) SpO2 98% BMI 30.65 kg/m² General appearance - alert, well appearing, and in no distress Eyes - pupils equal and reactive, extraocular eye movements intact, sclera anicteric Oropharyngx - mucous membranes moist, pharynx normal without lesions Neck - supple, no significant adenopathy Chest - clear to auscultation, no wheezes, rales or rhonchi, symmetric air entry, no tachypnea, retractions or cyanosis Heart - normal rate, regular rhythm, normal S1, S2, no murmurs, rubs, clicks or gallops Neurological - alert, oriented, normal speech, no focal findings or movement disorder noted Extremities - peripheral pulses normal, no pedal edema, no clubbing or cyanosis Mental Status: anxious, alert, oriented to person, place, and time, normal behavior, speech, dress, motor activity, and thought processes EKG: normal EKG, normal sinus rhythm. Assessment/Plan:  
Nicole Santos is a 74 y.o. female seen for:  
 
1. Type 2 diabetes mellitus with complication, without long-term current use of insulin ([facility] [address]): has been controlled, check A1c. Continue with current therapy.  
- HEMOGLOBIN A1C WITH EAG 2. Chest pain, unspecified type: EKG not ischemic. DDx includes musculoskeletal pain vs anxiety driven vs cardiac. Likely secondary to her anxiety. - AMB POC EKG ROUTINE W/ 12 LEADS, INTER & REP 3. Hypothyroidism, unspecified type: continue with current dose of levothyroxine. Check TSH.  
- TSH AND FREE T4 
 
4. Depression, unspecified depression type: will continue with current therapy at this time. She may benefit from another evaluation by Psychiatry. - venlafaxine-SR (EFFEXOR-XR) 150 mg capsule; TAKE ONE CAPSULE BY MOUTH DAILY. Take with 75 mg capsule. Dispense: 90 Cap; Refill: 1 
- venlafaxine-SR (EFFEXOR-XR) 75 mg capsule; Take 1 Cap by mouth daily. Dispense: 90 Cap; Refill: 1 5. Frequent falls: will check labs as below to ensure no metabolic etiology for her falls. Refer to PT for balance and gait strengthening. Will request Neurology notes as well.   
- CBC WITH AUTOMATED DIFF 
 - VITAMIN D, 25 HYDROXY 
- VITAMIN B12 
- REFERRAL TO PHYSICAL THERAPY 6. Mixed hyperlipidemia: continue therapy. - atorvastatin (LIPITOR) 40 mg tablet; TAKE ONE TABLET BY MOUTH DAILY  Dispense: 90 Tab; Refill: 1 
- fenofibric acid (TRILIPIX ER) 135 mg capsule; TAKE ONE CAPSULE BY MOUTH DAILY  Dispense: 90 Cap; Refill: 1 
- METABOLIC PANEL, COMPREHENSIVE 7. Vitamin D deficiency  
- VITAMIN D, 25 HYDROXY 8. Other general symptoms and signs - VITAMIN B12 
 
9. Short-term memory loss: not on medication. Request Neurology notes to see what workup has been performed. Discussed that she should follow up with Neurology. I have discussed the diagnosis with the patient and the intended plan as seen in the above orders. The patient has received an after-visit summary and questions were answered concerning future plans. I have discussed medication side effects and warnings with the patient as well. Patient verbalizes understanding of plan of care and denies further questions or concerns at this time. Informed patient to return to the office if symptoms worsen or if new symptoms arise. Follow-up Disposition: Not on File

## 2019-01-23 LAB
25(OH)D3+25(OH)D2 SERPL-MCNC: 53.5 NG/ML (ref 30–100)
ALBUMIN SERPL-MCNC: 4.7 G/DL (ref 3.5–4.8)
ALBUMIN/GLOB SERPL: 2.4 {RATIO} (ref 1.2–2.2)
ALP SERPL-CCNC: 65 IU/L (ref 39–117)
ALT SERPL-CCNC: 18 IU/L (ref 0–32)
AST SERPL-CCNC: 27 IU/L (ref 0–40)
BASOPHILS # BLD AUTO: 0 X10E3/UL (ref 0–0.2)
BASOPHILS NFR BLD AUTO: 0 %
BILIRUB SERPL-MCNC: 0.4 MG/DL (ref 0–1.2)
BUN SERPL-MCNC: 22 MG/DL (ref 8–27)
BUN/CREAT SERPL: 22 (ref 12–28)
CALCIUM SERPL-MCNC: 9.7 MG/DL (ref 8.7–10.3)
CHLORIDE SERPL-SCNC: 101 MMOL/L (ref 96–106)
CO2 SERPL-SCNC: 23 MMOL/L (ref 20–29)
CREAT SERPL-MCNC: 1.02 MG/DL (ref 0.57–1)
EOSINOPHIL # BLD AUTO: 0.1 X10E3/UL (ref 0–0.4)
EOSINOPHIL NFR BLD AUTO: 2 %
ERYTHROCYTE [DISTWIDTH] IN BLOOD BY AUTOMATED COUNT: 14.6 % (ref 12.3–15.4)
EST. AVERAGE GLUCOSE BLD GHB EST-MCNC: 166 MG/DL
GLOBULIN SER CALC-MCNC: 2 G/DL (ref 1.5–4.5)
GLUCOSE SERPL-MCNC: 188 MG/DL (ref 65–99)
HBA1C MFR BLD: 7.4 % (ref 4.8–5.6)
HCT VFR BLD AUTO: 39.3 % (ref 34–46.6)
HGB BLD-MCNC: 13.1 G/DL (ref 11.1–15.9)
IMM GRANULOCYTES # BLD AUTO: 0 X10E3/UL (ref 0–0.1)
IMM GRANULOCYTES NFR BLD AUTO: 0 %
INTERPRETATION: NORMAL
LYMPHOCYTES # BLD AUTO: 1.1 X10E3/UL (ref 0.7–3.1)
LYMPHOCYTES NFR BLD AUTO: 17 %
Lab: NORMAL
MCH RBC QN AUTO: 28.9 PG (ref 26.6–33)
MCHC RBC AUTO-ENTMCNC: 33.3 G/DL (ref 31.5–35.7)
MCV RBC AUTO: 87 FL (ref 79–97)
MONOCYTES # BLD AUTO: 0.4 X10E3/UL (ref 0.1–0.9)
MONOCYTES NFR BLD AUTO: 6 %
NEUTROPHILS # BLD AUTO: 5 X10E3/UL (ref 1.4–7)
NEUTROPHILS NFR BLD AUTO: 75 %
PLATELET # BLD AUTO: 263 X10E3/UL (ref 150–379)
POTASSIUM SERPL-SCNC: 4.2 MMOL/L (ref 3.5–5.2)
PROT SERPL-MCNC: 6.7 G/DL (ref 6–8.5)
RBC # BLD AUTO: 4.54 X10E6/UL (ref 3.77–5.28)
SODIUM SERPL-SCNC: 141 MMOL/L (ref 134–144)
T4 FREE SERPL-MCNC: 1.36 NG/DL (ref 0.82–1.77)
TSH SERPL DL<=0.005 MIU/L-ACNC: 2.49 UIU/ML (ref 0.45–4.5)
VIT B12 SERPL-MCNC: 276 PG/ML (ref 232–1245)
WBC # BLD AUTO: 6.7 X10E3/UL (ref 3.4–10.8)

## 2019-01-24 DIAGNOSIS — M15.9 PRIMARY OSTEOARTHRITIS INVOLVING MULTIPLE JOINTS: ICD-10-CM

## 2019-01-24 DIAGNOSIS — K21.9 GASTROESOPHAGEAL REFLUX DISEASE WITHOUT ESOPHAGITIS: ICD-10-CM

## 2019-01-24 RX ORDER — OMEPRAZOLE 40 MG/1
CAPSULE, DELAYED RELEASE ORAL
Qty: 90 CAP | Refills: 1 | Status: SHIPPED | OUTPATIENT
Start: 2019-01-24 | End: 2019-07-20 | Stop reason: SDUPTHER

## 2019-01-24 RX ORDER — CELECOXIB 200 MG/1
200 CAPSULE ORAL DAILY
Qty: 90 CAP | Refills: 1 | Status: SHIPPED | OUTPATIENT
Start: 2019-01-24 | End: 2019-07-20 | Stop reason: SDUPTHER

## 2019-02-07 ENCOUNTER — TELEPHONE (OUTPATIENT)
Dept: FAMILY MEDICINE CLINIC | Age: 74
End: 2019-02-07

## 2019-03-26 RX ORDER — BUPROPION HYDROCHLORIDE 150 MG/1
TABLET ORAL
Qty: 90 TAB | Refills: 2 | Status: SHIPPED | OUTPATIENT
Start: 2019-03-26 | End: 2019-09-23 | Stop reason: SDUPTHER

## 2019-05-08 ENCOUNTER — HOSPITAL ENCOUNTER (OUTPATIENT)
Dept: LAB | Age: 74
Discharge: HOME | End: 2019-05-08
Payer: MEDICARE

## 2019-05-08 ENCOUNTER — OFFICE VISIT (OUTPATIENT)
Dept: FAMILY MEDICINE CLINIC | Age: 74
End: 2019-05-08

## 2019-05-08 VITALS
SYSTOLIC BLOOD PRESSURE: 138 MMHG | RESPIRATION RATE: 18 BRPM | OXYGEN SATURATION: 98 % | WEIGHT: 168 LBS | DIASTOLIC BLOOD PRESSURE: 80 MMHG | BODY MASS INDEX: 29.77 KG/M2 | TEMPERATURE: 97.4 F | HEART RATE: 67 BPM | HEIGHT: 63 IN

## 2019-05-08 DIAGNOSIS — E03.9 HYPOTHYROIDISM, UNSPECIFIED TYPE: ICD-10-CM

## 2019-05-08 DIAGNOSIS — R29.6 FREQUENT FALLS: ICD-10-CM

## 2019-05-08 DIAGNOSIS — E78.2 MIXED HYPERLIPIDEMIA: ICD-10-CM

## 2019-05-08 DIAGNOSIS — R41.3 MEMORY LOSS: ICD-10-CM

## 2019-05-08 DIAGNOSIS — E11.9 TYPE 2 DIABETES MELLITUS WITHOUT COMPLICATION, WITHOUT LONG-TERM CURRENT USE OF INSULIN (HCC): Primary | ICD-10-CM

## 2019-05-08 LAB
ALBUMIN UR QL STRIP: 30 MG/L
CREATININE, URINE POC: 100 MG/DL
MICROALBUMIN/CREAT RATIO POC: <30 MG/G

## 2019-05-08 PROCEDURE — 83036 HEMOGLOBIN GLYCOSYLATED A1C: CPT

## 2019-05-08 PROCEDURE — 80061 LIPID PANEL: CPT

## 2019-05-08 PROCEDURE — 80053 COMPREHEN METABOLIC PANEL: CPT

## 2019-05-08 PROCEDURE — 36415 COLL VENOUS BLD VENIPUNCTURE: CPT

## 2019-05-08 NOTE — PROGRESS NOTES
Subjective: Bob Underwood is a 74 y.o. female who presents for follow up of diabetes and hyperlipidemia. She is not fasting. She is accompanied by her son Devika Quintero. Diabetes mellitus: · Medication compliance: compliant all of the time · Diabetic diet compliance: compliant most of the time · Home glucose monitoring: is not performed · Further diabetic ROS: no polyuria or polydipsia, no chest pain or dyspnea, no hypoglycemia, has dysesthesias in the feet. · Toenails are thickened and she has difficulty with trimming them herself. She has been to Podiatry previously, but unable to recall name of physician or office. Short term memory loss: has been evaluated by Neurology at [facility] in [facility]. She has upcoming appointment. Son expresses that he memory appears to be worsening.  
  
Frequent Falls: states that she is falling at least once per month. Referred to PT at her last OV, but reports that she only attended 1 session. She does have neuropathy. Currently does not ambulate with cane or walker. Hypothyroidism: compliant with levothyroxine therapy. Denies fatigue, temperature intolerance, bowel changes. Current Outpatient Medications Medication Sig Dispense Refill  SITagliptin (JANUVIA) 25 mg tablet TAKE ONE TABLET BY MOUTH DAILY 90 Tab 1  
 SITagliptin (JANUVIA) 25 mg tablet Take 1 Tab by mouth daily. 30 Tab 0  
 buPROPion XL (WELLBUTRIN XL) 150 mg tablet Take 1 Tab by mouth daily. 90 Tab 2  
 omeprazole (PRILOSEC) 40 mg capsule Take 1 Cap by mouth daily. 90 Cap 1  
 atorvastatin (LIPITOR) 40 mg tablet TAKE ONE TABLET BY MOUTH DAILY 90 Tab 1  
 fenofibric acid (TRILIPIX ER) 135 mg capsule TAKE ONE CAPSULE BY MOUTH DAILY 90 Cap 1  venlafaxine-SR (EFFEXOR-XR) 150 mg capsule TAKE ONE CAPSULE BY MOUTH DAILY. Take with 75 mg capsule. 90 Cap 1  venlafaxine-SR (EFFEXOR-XR) 75 mg capsule Take 1 Cap by mouth daily.  90 Cap 1  
  levothyroxine (SYNTHROID) 75 mcg tablet Take 1 Tab by mouth Daily (before breakfast). 90 Tab 1  vitamin D3-vitamin K2, MK4, 1,000-100 unit-mcg tab Take 5,000 Int'l Units by mouth daily.  celecoxib (CELEBREX) 200 mg capsule Take 1 Cap by mouth daily. Indications: OSTEOARTHRITIS 90 Cap 1  
 busPIRone (BUSPAR) 10 mg tablet Take 1 Tab by mouth two (2) times a day. 180 Tab 1 Allergies Allergen Reactions  Gabapentin Drowsiness  Deserpidine Other (comments) Puts pressure around her head like a ring Past Medical History:  
Diagnosis Date  Depression  Hypercholesterolemia  BERTRAM on CPAP Dr. Ashely Mcdermott  Thyroid disease  Urinary incontinence Social History Tobacco Use  Smoking status: Never Smoker  Smokeless tobacco: Never Used Substance Use Topics  Alcohol use: No  
  
 
Lab Results Component Value Date/Time WBC 6.7 01/22/2019 03:29 PM  
 HGB 13.1 01/22/2019 03:29 PM  
 HCT 39.3 01/22/2019 03:29 PM  
 PLATELET 819 32/64/3777 03:29 PM  
 MCV 87 01/22/2019 03:29 PM  
 
 
Lab Results Component Value Date/Time TSH 2.490 01/22/2019 03:29 PM  
 T4, Free 1.36 01/22/2019 03:29 PM  
 T4, Total 11.1 10/28/2016 08:30 AM  
   
 
Review of Systems, additional: 
Constitutional: negative for fevers and chills Eyes: negative for visual disturbance and irritation Ears, nose, mouth, throat, and face: negative for nasal congestion and sore throat Respiratory: negative for cough, sputum or dyspnea on exertion Cardiovascular: negative for chest pain, chest pressure/discomfort, palpitations, irregular heart beats, lower extremity edema Gastrointestinal: negative for nausea, vomiting, change in bowel habits and abdominal pain Genitourinary:negative for frequency, dysuria and hematuria Musculoskeletal: positive for arthralgias Neurological: negative for headaches, dizziness and paresthesia Objective:  
 
Visit Vitals /80 (BP 1 Location: Right arm, BP Patient Position: Sitting) Comment: Manual  
Pulse 67 Temp 97.4 °F (36.3 °C) (Oral) Comment: . Resp 18 Ht 5' 3\" (1.6 m) Wt 168 lb (76.2 kg) SpO2 98% BMI 29.76 kg/m² General appearance - alert, well appearing, and in no distress Mental status - alert, oriented to person, place, and time, normal mood, behavior, speech, dress, motor activity, and thought processes Eyes - pupils equal and reactive, extraocular eye movements intact, sclera anicteric Chest - clear to auscultation, no wheezes, rales or rhonchi, symmetric air entry, no tachypnea, retractions or cyanosis Diabetic foot exam:  
 
Left Foot: 
 Visual Exam: toenails are thickened Pulse DP: 2+ (normal) Filament test: reduced sensation Right Foot: 
 Visual Exam: toenails are thickened Pulse DP: 2+ (normal) Filament test: reduced sensation Recent Results (from the past 12 hour(s)) AMB POC URINE, MICROALBUMIN, SEMIQUANT (3 RESULTS) Collection Time: 05/08/19  8:54 AM  
Result Value Ref Range ALBUMIN, URINE POC 30 Negative mg/L  
 CREATININE, URINE  mg/dL Microalbumin/creat ratio (POC) <30 <30 MG/G  
 
Lab review: orders written for new lab studies as appropriate; see orders. Assessment/Plan:  
Enedina Wilson is a 74 y.o. female seen today for:  
 
1. Type 2 diabetes mellitus without complication, without long-term current use of insulin [facility]): check labs. Will refer to Podiatry for diabetic foot and nail care. - AMB POC URINE, MICROALBUMIN, SEMIQUANT (3 RESULTS) 
- HEMOGLOBIN A1C WITH EAG 
- METABOLIC PANEL, COMPREHENSIVE 
- REFERRAL TO PODIATRY 
- SITagliptin (JANUVIA) 25 mg tablet; TAKE ONE TABLET BY MOUTH DAILY  Dispense: 90 Tab; Refill: 1 
- SITagliptin (JANUVIA) 25 mg tablet; Take 1 Tab by mouth daily. Dispense: 30 Tab; Refill: 0 
- HM DIABETES FOOT EXAM 
 
2. Hypothyroidism, unspecified type: controlled, continue with current dose of levothyroxine. 3. Mixed hyperlipidemia: continue with current statin therapy. - METABOLIC PANEL, COMPREHENSIVE 
- LIPID PANEL 4. BMI 29.0-29.9,adult: I have reviewed/discussed the above normal BMI with the patient. I have recommended the following interventions: dietary management education, guidance, and counseling and encourage exercise. 5. Frequent falls: refer to PT for balance training and fall prevention. She would prefer office closer to her home - will fax script to [facility] in [facility], [address] [facility] 6. Memory loss: refer to neuropsychology for further evaluation. Will have our  assistance in scheduling appointment.  
- [address] I have discussed the diagnosis with the patient and the intended plan as seen in the above orders. The patient has received an after-visit summary and questions were answered concerning future plans. I have discussed medication side effects and warnings with the patient as well. Patient verbalizes understanding of plan of care and denies further questions or concerns at this time. Informed patient to return to the office if symptoms worsen or if new symptoms arise. Follow-up and Dispositions · Return in about 3 months (around 8/8/2019) for follow up or sooner as needed.

## 2019-05-08 NOTE — PROGRESS NOTES
Identified pt with two pt identifiers(name and ). Chief Complaint Patient presents with  Labs Patient is not fasting  Diabetes Health Maintenance Due Topic  DTaP/Tdap/Td series (1 - Tdap)  FOOT EXAM Q1   
 BREAST CANCER SCRN MAMMOGRAM   
 Shingrix Vaccine Age 50> (2 of 2)  MICROALBUMIN Q1   
 LIPID PANEL Q1 Wt Readings from Last 3 Encounters:  
19 168 lb (76.2 kg) 19 173 lb (78.5 kg) 18 176 lb 3.2 oz (79.9 kg) Temp Readings from Last 3 Encounters:  
19 97.4 °F (36.3 °C) (Oral) 19 98.1 °F (36.7 °C) (Oral) 18 97.5 °F (36.4 °C) (Oral) BP Readings from Last 3 Encounters:  
19 138/80  
19 122/78  
18 116/62 Pulse Readings from Last 3 Encounters:  
19 67  
19 64  
18 72 Learning Assessment: 
:  
 
Learning Assessment 10/28/2014 PRIMARY LEARNER Patient HIGHEST LEVEL OF EDUCATION - PRIMARY LEARNER  GRADUATED HIGH SCHOOL OR GED  
BARRIERS PRIMARY LEARNER NONE  
CO-LEARNER CAREGIVER No  
PRIMARY LANGUAGE ENGLISH  NEED No  
LEARNER PREFERENCE PRIMARY LISTENING  
LEARNING SPECIAL TOPICS no  
ANSWERED BY self RELATIONSHIP SELF  
ASSESSMENT COMMENT no  
 
 
Depression Screening: 
:  
 
3 most recent PHQ Screens 2019 Little interest or pleasure in doing things Not at all Feeling down, depressed, irritable, or hopeless Not at all Total Score PHQ 2 0 Trouble falling or staying asleep, or sleeping too much - Feeling tired or having little energy - Poor appetite, weight loss, or overeating - Feeling bad about yourself - or that you are a failure or have let yourself or your family down - Trouble concentrating on things such as school, work, reading, or watching TV - Moving or speaking so slowly that other people could have noticed; or the opposite being so fidgety that others notice -  
 Thoughts of being better off dead, or hurting yourself in some way -  
PHQ 9 Score - How difficult have these problems made it for you to do your work, take care of your home and get along with others - Fall Risk Assessment: 
:  
 
Fall Risk Assessment, last 12 mths 1/22/2019 Able to walk? Yes Fall in past 12 months? Yes Fall with injury? No  
Number of falls in past 12 months 6 Fall Risk Score 6 Abuse Screening: 
:  
 
Abuse Screening Questionnaire 1/22/2019 4/9/2018 10/28/2014 Do you ever feel afraid of your partner? N N N Are you in a relationship with someone who physically or mentally threatens you? N N N Is it safe for you to go home? Shan Ferrer Coordination of Care Questionnaire: 
:  
 
1) Have you been to an emergency room, urgent care clinic since your last visit? no  
Hospitalized since your last visit? no          
 
2) Have you seen or consulted any other health care providers outside of [facility] since your last visit? no  (Include any pap smears or colon screenings in this section.) 3) Do you have an Advance Directive on file? no 
Are you interested in receiving information about Advance Directives? no 
 
Reviewed record in preparation for visit and have obtained necessary documentation. Medication reconciliation up to date and corrected with patient at this time.

## 2019-05-08 NOTE — PATIENT INSTRUCTIONS
A Healthy Lifestyle: Care Instructions Your Care Instructions A healthy lifestyle can help you feel good, stay at a healthy weight, and have plenty of energy for both work and play. A healthy lifestyle is something you can share with your whole family. A healthy lifestyle also can lower your risk for serious health problems, such as high blood pressure, heart disease, and diabetes. You can follow a few steps listed below to improve your health and the health of your family. Follow-up care is a key part of your treatment and safety. Be sure to make and go to all appointments, and call your doctor if you are having problems. It's also a good idea to know your test results and keep a list of the medicines you take. How can you care for yourself at home? · Do not eat too much sugar, fat, or fast foods. You can still have dessert and treats now and then. The goal is moderation. · Start small to improve your eating habits. Pay attention to portion sizes, drink less juice and soda pop, and eat more fruits and vegetables. ? Eat a healthy amount of food. A 3-ounce serving of meat, for example, is about the size of a deck of cards. Fill the rest of your plate with vegetables and whole grains. ? Limit the amount of soda and sports drinks you have every day. Drink more water when you are thirsty. ? Eat at least 5 servings of fruits and vegetables every day. It may seem like a lot, but it is not hard to reach this goal. A serving or helping is 1 piece of fruit, 1 cup of vegetables, or 2 cups of leafy, raw vegetables. Have an apple or some carrot sticks as an afternoon snack instead of a candy bar. Try to have fruits and/or vegetables at every meal. 
· Make exercise part of your daily routine. You may want to start with simple activities, such as walking, bicycling, or slow swimming. Try to be active 30 to 60 minutes every day.  You do not need to do all 30 to 60 minutes all at once. For example, you can exercise 3 times a day for 10 or 20 minutes. Moderate exercise is safe for most people, but it is always a good idea to talk to your doctor before starting an exercise program. 
· Keep moving. Sam Mendoza the lawn, work in the garden, or [employer]. Take the stairs instead of the elevator at work. · If you smoke, quit. People who smoke have an increased risk for heart attack, stroke, cancer, and other lung illnesses. Quitting is hard, but there are ways to boost your chance of quitting tobacco for good. ? Use nicotine gum, patches, or lozenges. ? Ask your doctor about stop-smoking programs and medicines. ? Keep trying. In addition to reducing your risk of diseases in the future, you will notice some benefits soon after you stop using tobacco. If you have shortness of breath or asthma symptoms, they will likely get better within a few weeks after you quit. · Limit how much alcohol you drink. Moderate amounts of alcohol (up to 2 drinks a day for men, 1 drink a day for women) are okay. But drinking too much can lead to liver problems, high blood pressure, and other health problems. Family health If you have a family, there are many things you can do together to improve your health. · Eat meals together as a family as often as possible. · Eat healthy foods. This includes fruits, vegetables, lean meats and dairy, and whole grains. · Include your family in your fitness plan. Most people think of activities such as jogging or tennis as the way to fitness, but there are many ways you and your family can be more active. Anything that makes you breathe hard and gets your heart pumping is exercise. Here are some tips: 
? Walk to do errands or to take your child to school or the bus. 
? Go for a family bike ride after dinner instead of watching TV. Where can you learn more? Go to http://celestina-dontae.info/. Enter V189 in the search box to learn more about \"A Healthy Lifestyle: Care Instructions. \" Current as of: September 11, 2018 Content Version: 11.9 © 1615-7120 [employer], Incorporated. Care instructions adapted under license by [employer] (which disclaims liability or warranty for this information). If you have questions about a medical condition or this instruction, always ask your healthcare professional. [place] [address] any warranty or liability for your use of this information.

## 2019-05-09 LAB
ALBUMIN SERPL-MCNC: 4.4 G/DL (ref 3.5–4.8)
ALBUMIN/GLOB SERPL: 2.4 {RATIO} (ref 1.2–2.2)
ALP SERPL-CCNC: 60 IU/L (ref 39–117)
ALT SERPL-CCNC: 14 IU/L (ref 0–32)
AST SERPL-CCNC: 24 IU/L (ref 0–40)
BILIRUB SERPL-MCNC: 0.5 MG/DL (ref 0–1.2)
BUN SERPL-MCNC: 17 MG/DL (ref 8–27)
BUN/CREAT SERPL: 20 (ref 12–28)
CALCIUM SERPL-MCNC: 9.5 MG/DL (ref 8.7–10.3)
CHLORIDE SERPL-SCNC: 103 MMOL/L (ref 96–106)
CHOLEST SERPL-MCNC: 177 MG/DL (ref 100–199)
CO2 SERPL-SCNC: 23 MMOL/L (ref 20–29)
CREAT SERPL-MCNC: 0.83 MG/DL (ref 0.57–1)
EST. AVERAGE GLUCOSE BLD GHB EST-MCNC: 160 MG/DL
GLOBULIN SER CALC-MCNC: 1.8 G/DL (ref 1.5–4.5)
GLUCOSE SERPL-MCNC: 177 MG/DL (ref 65–99)
HBA1C MFR BLD: 7.2 % (ref 4.8–5.6)
HDLC SERPL-MCNC: 60 MG/DL
INTERPRETATION, 910389: NORMAL
LDLC SERPL CALC-MCNC: 97 MG/DL (ref 0–99)
Lab: NORMAL
POTASSIUM SERPL-SCNC: 4.5 MMOL/L (ref 3.5–5.2)
PROT SERPL-MCNC: 6.2 G/DL (ref 6–8.5)
SODIUM SERPL-SCNC: 144 MMOL/L (ref 134–144)
TRIGL SERPL-MCNC: 101 MG/DL (ref 0–149)
VLDLC SERPL CALC-MCNC: 20 MG/DL (ref 5–40)

## 2019-06-28 ENCOUNTER — TELEPHONE (OUTPATIENT)
Dept: FAMILY MEDICINE CLINIC | Age: 74
End: 2019-06-28

## 2019-06-28 NOTE — TELEPHONE ENCOUNTER
Son is calling and is concerned because his mother is talking out of head, has a lot of anxiety and last night was scared and said that she was in a room with a bunch of people and that her daughters were there and would not let her go to sleep. Please call him at [phone] to advise.

## 2019-07-20 DIAGNOSIS — M15.9 PRIMARY OSTEOARTHRITIS INVOLVING MULTIPLE JOINTS: ICD-10-CM

## 2019-07-20 DIAGNOSIS — E78.2 MIXED HYPERLIPIDEMIA: ICD-10-CM

## 2019-07-20 DIAGNOSIS — F32.A DEPRESSION, UNSPECIFIED DEPRESSION TYPE: ICD-10-CM

## 2019-07-20 DIAGNOSIS — K21.9 GASTROESOPHAGEAL REFLUX DISEASE WITHOUT ESOPHAGITIS: ICD-10-CM

## 2019-07-22 RX ORDER — OMEPRAZOLE 40 MG/1
CAPSULE, DELAYED RELEASE ORAL
Qty: 90 CAP | Refills: 1 | Status: SHIPPED | OUTPATIENT
Start: 2019-07-22 | End: 2019-09-23 | Stop reason: SDUPTHER

## 2019-07-22 RX ORDER — VENLAFAXINE HYDROCHLORIDE 150 MG/1
CAPSULE, EXTENDED RELEASE ORAL
Qty: 90 CAP | Refills: 1 | Status: SHIPPED | OUTPATIENT
Start: 2019-07-22 | End: 2019-09-23 | Stop reason: SDUPTHER

## 2019-07-22 RX ORDER — ATORVASTATIN CALCIUM 40 MG/1
TABLET, FILM COATED ORAL
Qty: 90 TAB | Refills: 1 | Status: SHIPPED | OUTPATIENT
Start: 2019-07-22 | End: 2019-09-23 | Stop reason: SDUPTHER

## 2019-07-22 RX ORDER — FENOFIBRIC ACID 135 MG/1
CAPSULE, DELAYED RELEASE ORAL
Qty: 90 CAP | Refills: 1 | Status: SHIPPED | OUTPATIENT
Start: 2019-07-22 | End: 2019-09-23 | Stop reason: SDUPTHER

## 2019-07-22 RX ORDER — CELECOXIB 200 MG/1
200 CAPSULE ORAL DAILY
Qty: 90 CAP | Refills: 1 | Status: SHIPPED | OUTPATIENT
Start: 2019-07-22 | End: 2019-09-23 | Stop reason: SDUPTHER

## 2019-07-25 ENCOUNTER — OFFICE VISIT (OUTPATIENT)
Dept: FAMILY MEDICINE CLINIC | Age: 74
End: 2019-07-25

## 2019-07-25 VITALS
BODY MASS INDEX: 29.59 KG/M2 | RESPIRATION RATE: 18 BRPM | TEMPERATURE: 97.8 F | HEIGHT: 63 IN | DIASTOLIC BLOOD PRESSURE: 80 MMHG | OXYGEN SATURATION: 98 % | HEART RATE: 70 BPM | WEIGHT: 167 LBS | SYSTOLIC BLOOD PRESSURE: 124 MMHG

## 2019-07-25 DIAGNOSIS — Z09 HOSPITAL DISCHARGE FOLLOW-UP: ICD-10-CM

## 2019-07-25 DIAGNOSIS — R55 SYNCOPE, UNSPECIFIED SYNCOPE TYPE: ICD-10-CM

## 2019-07-25 DIAGNOSIS — E03.9 HYPOTHYROIDISM, UNSPECIFIED TYPE: Primary | ICD-10-CM

## 2019-07-25 NOTE — PROGRESS NOTES
Identified pt with two pt identifiers(name and ).     Chief Complaint   Patient presents with   [facility] Follow Up        Health Maintenance Due   Topic    DTaP/Tdap/Td series (1 - Tdap)    BREAST CANCER SCRN MAMMOGRAM     Shingrix Vaccine Age 50> (2 of 2)    MEDICARE YEARLY EXAM        Wt Readings from Last 3 Encounters:   19 167 lb (75.8 kg)   19 168 lb (76.2 kg)   19 173 lb (78.5 kg)     Temp Readings from Last 3 Encounters:   19 97.8 °F (36.6 °C) (Oral)   19 97.4 °F (36.3 °C) (Oral)   19 98.1 °F (36.7 °C) (Oral)     BP Readings from Last 3 Encounters:   19 124/80   19 138/80   19 122/78     Pulse Readings from Last 3 Encounters:   19 70   19 67   19 64         Learning Assessment:  :     Learning Assessment 10/28/2014   PRIMARY LEARNER Patient   HIGHEST LEVEL OF EDUCATION - PRIMARY LEARNER  GRADUATED HIGH SCHOOL OR GED   BARRIERS PRIMARY LEARNER NONE   CO-LEARNER CAREGIVER No   PRIMARY LANGUAGE ENGLISH    NEED No   LEARNER PREFERENCE PRIMARY LISTENING   LEARNING SPECIAL TOPICS no   ANSWERED BY self   RELATIONSHIP SELF   ASSESSMENT COMMENT no       Depression Screening:  :     3 most recent PHQ Screens 2019   Little interest or pleasure in doing things Not at all   Feeling down, depressed, irritable, or hopeless Not at all   Total Score PHQ 2 0   Trouble falling or staying asleep, or sleeping too much -   Feeling tired or having little energy -   Poor appetite, weight loss, or overeating -   Feeling bad about yourself - or that you are a failure or have let yourself or your family down -   Trouble concentrating on things such as school, work, reading, or watching TV -   Moving or speaking so slowly that other people could have noticed; or the opposite being so fidgety that others notice -   Thoughts of being better off dead, or hurting yourself in some way -   PHQ 9 Score -   How difficult have these problems made it for you to do your work, take care of your home and get along with others -       Fall Risk Assessment:  :     Fall Risk Assessment, last 12 mths 1/22/2019   Able to walk? Yes   Fall in past 12 months? Yes   Fall with injury? No   Number of falls in past 12 months 6   Fall Risk Score 6       Abuse Screening:  :     Abuse Screening Questionnaire 1/22/2019 4/9/2018 10/28/2014   Do you ever feel afraid of your partner? N N N   Are you in a relationship with someone who physically or mentally threatens you? N N N   Is it safe for you to go home? Y Y Y       Coordination of Care Questionnaire:  :     1) Have you been to an emergency room, urgent care clinic since your last visit? no   Hospitalized since your last visit? no             2) Have you seen or consulted any other health care providers outside of [address] [place] [address] since your last visit? no  (Include any pap smears or colon screenings in this section.)    3) Do you have an Advance Directive on file? no  Are you interested in receiving information about Advance Directives? no    Patient is accompanied by mother I have received verbal consent from Norman Lange to discuss any/all medical information while they are present in the room. Reviewed record in preparation for visit and have obtained necessary documentation. Medication reconciliation up to date and corrected with patient at this time.

## 2019-07-25 NOTE — PATIENT INSTRUCTIONS
Hypothyroidism: Care Instructions  Your Care Instructions    You have hypothyroidism, which means that your body is not making enough thyroid hormone. This hormone helps your body use energy. If your thyroid level is low, you may feel tired, be constipated, have an increase in your blood pressure, or have dry skin or memory problems. You may also get cold easily, even when it is warm. Women with low thyroid levels may have heavy menstrual periods. A blood test to find your thyroid-stimulating hormone (TSH) level is used to check for hypothyroidism. A high TSH level may mean that you have low thyroid. When your body is not making enough thyroid hormone, TSH levels rise in an effort to make the body produce more. The treatment for hypothyroidism is to take thyroid hormone pills. You should start to feel better in 1 to 2 weeks. But it can take several months to see changes in the TSH level. You will need regular visits with your doctor to make sure you have the right dose of medicine. Most people need treatment for the rest of their lives. You will need to see your doctor regularly to have blood tests and to make sure you are doing well. Follow-up care is a key part of your treatment and safety. Be sure to make and go to all appointments, and call your doctor if you are having problems. It's also a good idea to know your test results and keep a list of the medicines you take. How can you care for yourself at home? · Take your thyroid hormone medicine exactly as prescribed. Call your doctor if you think you are having a problem with your medicine. Most people do not have side effects if they take the right amount of medicine regularly. ? Take the medicine 30 minutes before breakfast, and do not take it with calcium, vitamins, or iron. ? Do not take extra doses of your thyroid medicine. It will not help you get better any faster, and it may cause side effects.   ? If you forget to take a dose, do NOT take a double dose of medicine. Take your usual dose the next day. · Tell your doctor about all prescription, herbal, or over-the-counter products you take. · Take care of yourself. Eat a healthy diet, get enough sleep, and get regular exercise. When should you call for help? Call 911 anytime you think you may need emergency care. For example, call if:    · You passed out (lost consciousness).     · You have severe trouble breathing.     · You have a very slow heartbeat (less than 60 beats a minute).     · You have a low body temperature (95°F or below).    Call your doctor now or seek immediate medical care if:    · You feel tired, sluggish, or weak.     · You have trouble remembering things or concentrating.     · You do not begin to feel better 2 weeks after starting your medicine.    Watch closely for changes in your health, and be sure to contact your doctor if you have any problems. Where can you learn more? Go to http://celestina-dontae.info/. Enter N192 in the search box to learn more about \"Hypothyroidism: Care Instructions. \"  Current as of: November 6, 2018  Content Version: 12.1  © 2657-3002 [employer]wise, Incorporated. Care instructions adapted under license by [employer] (which disclaims liability or warranty for this information). If you have questions about a medical condition or this instruction, always ask your healthcare professional. [address] any warranty or liability for your use of this information.

## 2019-07-25 NOTE — PROGRESS NOTES
Subjective: Bry Farley is a 74 y.o. female here for hospital discharge follow up. Accompanied by her son Jacqueline Thomas. Admitted to [facility] 7/22/19 - 7/23/19 for syncope. Discharge summary received and reviewed. TSH noted to be 78 and unclear as to whether she had been taking levothyroxine. Discharged home on levothyroxine 75 mcg and advised to follow up in 4-6 weeks. Head CT with no acute changes. She has been eating and drinking well. Has been tired and fatigued and discussed that this could be secondary to her thyroid function. She has follow up appointment Neurology scheduled. Current Outpatient Medications   Medication Sig Dispense Refill    atorvastatin (LIPITOR) 40 mg tablet TAKE ONE TABLET BY MOUTH DAILY 90 Tab 1    venlafaxine-SR (EFFEXOR-XR) 150 mg capsule TAKE ONE CAPSULE BY MOUTH DAILY. Take with 75 mg capsule. 90 Cap 1    omeprazole (PRILOSEC) 40 mg capsule Take 1 Cap by mouth daily. 90 Cap 1    fenofibric acid (TRILIPIX ER) 135 mg capsule TAKE ONE CAPSULE BY MOUTH DAILY 90 Cap 1    celecoxib (CELEBREX) 200 mg capsule Take 1 Cap by mouth daily. Indications: OSTEOARTHRITIS 90 Cap 1    SITagliptin (JANUVIA) 25 mg tablet TAKE ONE TABLET BY MOUTH DAILY 90 Tab 1    buPROPion XL (WELLBUTRIN XL) 150 mg tablet Take 1 Tab by mouth daily. 90 Tab 2    venlafaxine-SR (EFFEXOR-XR) 75 mg capsule Take 1 Cap by mouth daily. 90 Cap 1    levothyroxine (SYNTHROID) 75 mcg tablet Take 1 Tab by mouth Daily (before breakfast). 90 Tab 1    busPIRone (BUSPAR) 10 mg tablet Take 1 Tab by mouth two (2) times a day. 180 Tab 1    vitamin D3-vitamin K2, MK4, 1,000-100 unit-mcg tab Take 5,000 Int'l Units by mouth daily.          Allergies   Allergen Reactions    Gabapentin Drowsiness    Deserpidine Other (comments)     Puts pressure around her head like a ring       Past Medical History:   Diagnosis Date    Depression     Hypercholesterolemia     BERTRAM on CPAP     Dr. Nile Gale. Catie     Thyroid disease     Urinary incontinence        Social History     Tobacco Use    Smoking status: Never Smoker    Smokeless tobacco: Never Used   Substance Use Topics    Alcohol use: No        Review of Systems  Pertinent items are noted in HPI. Objective:     Visit Vitals  /80 (BP 1 Location: Right arm, BP Patient Position: Sitting) Comment: Manual   Pulse 70   Temp 97.8 °F (36.6 °C) (Oral) Comment: .   Resp 18   Ht 5' 3\" (1.6 m)   Wt 167 lb (75.8 kg)   SpO2 98%   BMI 29.58 kg/m²      General appearance - alert, well appearing, and in no distress  Eyes - pupils equal and reactive, extraocular eye movements intact, sclera anicteric  Oropharyngx - mucous membranes moist, pharynx normal without lesions  Neck - supple, no significant adenopathy, thyroid exam: thyroid is normal in size without nodules or tenderness  Chest - clear to auscultation, no wheezes, rales or rhonchi, symmetric air entry, no tachypnea, retractions or cyanosis, cardiac monitor in left chest  Heart - normal rate, regular rhythm, normal S1, S2, no murmurs, rubs, clicks or gallops    Assessment/Plan:   Bry Farley is a 74 y.o. female seen for:     1. Hypothyroidism, unspecified type: unclear if she has been taking medication. Medication compliance stressed and have asked that her son check to ensure that she has taken. Will repeat TSH and lab form provided. Would like to have done prior to her next appointment.   - TSH AND FREE T4    2. Syncope, unspecified syncope type: resolved. 3. Hospital discharge follow-up    4. BMI 29.0-29.9,adult: I have reviewed/discussed the above normal BMI with the patient. I have recommended the following interventions: dietary management education, guidance, and counseling and encourage exercise. **Asked that she bring all medications to her follow up appointment. I have discussed the diagnosis with the patient and the intended plan as seen in the above orders.  The patient has received an after-visit summary and questions were answered concerning future plans. I have discussed medication side effects and warnings with the patient as well. Patient verbalizes understanding of plan of care and denies further questions or concerns at this time. Informed patient to return to the office if symptoms worsen or if new symptoms arise. Follow-up and Dispositions    · Return in about 1 month (around 8/22/2019) for follow up or sooner as needed.

## 2019-07-31 ENCOUNTER — TELEPHONE (OUTPATIENT)
Dept: FAMILY MEDICINE CLINIC | Age: 74
End: 2019-07-31

## 2019-07-31 NOTE — TELEPHONE ENCOUNTER
Pt is calling regarding all medications. She is asking to speak w/ nurse.      Best contact: [phone]

## 2019-08-02 ENCOUNTER — OFFICE VISIT (OUTPATIENT)
Dept: FAMILY MEDICINE CLINIC | Age: 74
End: 2019-08-02

## 2019-08-02 VITALS
SYSTOLIC BLOOD PRESSURE: 120 MMHG | BODY MASS INDEX: 29.23 KG/M2 | DIASTOLIC BLOOD PRESSURE: 80 MMHG | WEIGHT: 165 LBS | RESPIRATION RATE: 18 BRPM | HEIGHT: 63 IN | OXYGEN SATURATION: 98 % | HEART RATE: 78 BPM | TEMPERATURE: 98.5 F

## 2019-08-02 DIAGNOSIS — E03.9 HYPOTHYROIDISM, UNSPECIFIED TYPE: ICD-10-CM

## 2019-08-02 DIAGNOSIS — J01.90 ACUTE RHINOSINUSITIS: Primary | ICD-10-CM

## 2019-08-02 RX ORDER — LEVOTHYROXINE SODIUM 75 UG/1
TABLET ORAL
Qty: 90 TAB | Refills: 1 | Status: SHIPPED | OUTPATIENT
Start: 2019-08-02 | End: 2019-08-21 | Stop reason: SDUPTHER

## 2019-08-02 RX ORDER — LEVOTHYROXINE SODIUM 75 UG/1
TABLET ORAL
Qty: 90 TAB | Refills: 1 | Status: SHIPPED | OUTPATIENT
Start: 2019-08-02 | End: 2019-08-02 | Stop reason: SDUPTHER

## 2019-08-02 NOTE — PATIENT INSTRUCTIONS
Sinusitis: Care Instructions  Your Care Instructions    Sinusitis is an infection of the lining of the sinus cavities in your head. Sinusitis often follows a cold. It causes pain and pressure in your head and face. In most cases, sinusitis gets better on its own in 1 to 2 weeks. But some mild symptoms may last for several weeks. Sometimes antibiotics are needed. Follow-up care is a key part of your treatment and safety. Be sure to make and go to all appointments, and call your doctor if you are having problems. It's also a good idea to know your test results and keep a list of the medicines you take. How can you care for yourself at home? · Take an over-the-counter pain medicine, such as acetaminophen (Tylenol), ibuprofen (Advil, Motrin), or naproxen (Aleve). Read and follow all instructions on the label. · If the doctor prescribed antibiotics, take them as directed. Do not stop taking them just because you feel better. You need to take the full course of antibiotics. · Be careful when taking over-the-counter cold or flu medicines and Tylenol at the same time. Many of these medicines have acetaminophen, which is Tylenol. Read the labels to make sure that you are not taking more than the recommended dose. Too much acetaminophen (Tylenol) can be harmful. · Breathe warm, moist air from a steamy shower, a hot bath, or a sink filled with hot water. Avoid cold, dry air. Using a humidifier in your home may help. Follow the directions for cleaning the machine. · Use saline (saltwater) nasal washes to help keep your nasal passages open and wash out mucus and bacteria. You can buy saline nose drops at a grocery store or drugstore. Or you can make your own at home by adding 1 teaspoon of salt and 1 teaspoon of baking soda to 2 cups of distilled water. If you make your own, fill a bulb syringe with the solution, insert the tip into your nostril, and squeeze gently. Jennye Garb your nose.   · Put a hot, wet towel or a warm gel pack on your face 3 or 4 times a day for 5 to 10 minutes each time. · Try a decongestant nasal spray like oxymetazoline (Afrin). Do not use it for more than 3 days in a row. Using it for more than 3 days can make your congestion worse. When should you call for help? Call your doctor now or seek immediate medical care if:    · You have new or worse swelling or redness in your face or around your eyes.     · You have a new or higher fever.    Watch closely for changes in your health, and be sure to contact your doctor if:    · You have new or worse facial pain.     · The mucus from your nose becomes thicker (like pus) or has new blood in it.     · You are not getting better as expected. Where can you learn more? Go to http://celestina-dontae.info/. Enter Z060 in the search box to learn more about \"Sinusitis: Care Instructions. \"  Current as of: October 21, 2018  Content Version: 12.1  © 7196-7495 [employer]. Care instructions adapted under license by [employer] (which disclaims liability or warranty for this information). If you have questions about a medical condition or this instruction, always ask your healthcare professional. [address] any warranty or liability for your use of this information.

## 2019-08-02 NOTE — PROGRESS NOTES
Subjective: Lizzy Enrique is a 74 y.o. female here for medication evaluation. Needs new Rx of levothyroxine. Also reports sinus headache, productive cough. Onset was several weeks ago. Denies fever, nasal congestion, ear fullness/pressure. No treatment to date. No known sick contacts. Current Outpatient Medications   Medication Sig Dispense Refill    levothyroxine (SYNTHROID) 75 mcg tablet Take 1 Tab by mouth Daily (before breakfast). 90 Tab 1    atorvastatin (LIPITOR) 40 mg tablet TAKE ONE TABLET BY MOUTH DAILY 90 Tab 1    venlafaxine-SR (EFFEXOR-XR) 150 mg capsule TAKE ONE CAPSULE BY MOUTH DAILY. Take with 75 mg capsule. 90 Cap 1    omeprazole (PRILOSEC) 40 mg capsule Take 1 Cap by mouth daily. 90 Cap 1    fenofibric acid (TRILIPIX ER) 135 mg capsule TAKE ONE CAPSULE BY MOUTH DAILY 90 Cap 1    celecoxib (CELEBREX) 200 mg capsule Take 1 Cap by mouth daily. Indications: OSTEOARTHRITIS 90 Cap 1    SITagliptin (JANUVIA) 25 mg tablet TAKE ONE TABLET BY MOUTH DAILY 90 Tab 1    buPROPion XL (WELLBUTRIN XL) 150 mg tablet Take 1 Tab by mouth daily. 90 Tab 2    venlafaxine-SR (EFFEXOR-XR) 75 mg capsule Take 1 Cap by mouth daily. 90 Cap 1    busPIRone (BUSPAR) 10 mg tablet Take 1 Tab by mouth two (2) times a day. 180 Tab 1    vitamin D3-vitamin K2, MK4, 1,000-100 unit-mcg tab Take 5,000 Int'l Units by mouth daily. Allergies   Allergen Reactions    Gabapentin Drowsiness    Deserpidine Other (comments)     Puts pressure around her head like a ring       Past Medical History:   Diagnosis Date    Depression     Hypercholesterolemia     BERTRAM on CPAP     Dr. Author Melendez     Thyroid disease     Urinary incontinence        Social History     Tobacco Use    Smoking status: Never Smoker    Smokeless tobacco: Never Used   Substance Use Topics    Alcohol use: No        Review of Systems  Pertinent items are noted in HPI.      Objective:     Visit Vitals  /80 (BP 1 Location: Right arm, BP Patient Position: Sitting) Comment: Manual   Pulse 78   Temp 98.5 °F (36.9 °C) (Oral) Comment: .   Resp 18   Ht 5' 3\" (1.6 m)   Wt 165 lb (74.8 kg)   SpO2 98%   BMI 29.23 kg/m²      General appearance - alert, well appearing, and in no distress  Eyes - pupils equal and reactive, extraocular eye movements intact, sclera anicteric  Ears - bilateral TM's and external ear canals normal  Nasal exam - mucosal congestion, mucosal erythema and sinus tenderness mild maxillary sinus tenderness  Oropharyngx - mucous membranes moist, pharynx normal without lesions  Neck - supple, no significant adenopathy  Chest - clear to auscultation, no wheezes, rales or rhonchi, symmetric air entry, no tachypnea, retractions or cyanosis, monitor in the left upper chest  Heart - normal rate, regular rhythm, normal S1, S2, no murmurs, rubs, clicks or gallops    Assessment/Plan:   Mellissa Skaggs is a 74 y.o. female seen for:     1. Acute rhinosinusitis:   - Symptomatic therapies recommended: oral antihistamine of choice, nasal saline as needed for congestion, warm compresses to sinuses, OTC analgesic of choice. To call should symptoms worsen or fail to improve as expected. 2. Hypothyroidism, unspecified type: new Rx sent to mail order pharmacy. - levothyroxine (SYNTHROID) 75 mcg tablet; Take 1 Tab by mouth Daily (before breakfast). Dispense: 90 Tab; Refill: 1    I have discussed the diagnosis with the patient and the intended plan as seen in the above orders. The patient has received an after-visit summary and questions were answered concerning future plans. I have discussed medication side effects and warnings with the patient as well. Patient verbalizes understanding of plan of care and denies further questions or concerns at this time. Informed patient to return to the office if symptoms worsen or if new symptoms arise. Follow-up and Dispositions    · Return if symptoms worsen or fail to improve.

## 2019-08-02 NOTE — PROGRESS NOTES
Identified pt with two pt identifiers(name and ).     Chief Complaint   Patient presents with    Sinus Pain     patient states she feels off and wonders if it is her sinuses or her medications    Medication Evaluation        Health Maintenance Due   Topic    DTaP/Tdap/Td series (1 - Tdap)    BREAST CANCER SCRN MAMMOGRAM     Shingrix Vaccine Age 49> (2 of 2)    Influenza Age 5 to Adult     MEDICARE YEARLY EXAM        Wt Readings from Last 3 Encounters:   19 165 lb (74.8 kg)   19 167 lb (75.8 kg)   19 168 lb (76.2 kg)     Temp Readings from Last 3 Encounters:   19 98.5 °F (36.9 °C) (Oral)   19 97.8 °F (36.6 °C) (Oral)   19 97.4 °F (36.3 °C) (Oral)     BP Readings from Last 3 Encounters:   19 120/80   19 124/80   19 138/80     Pulse Readings from Last 3 Encounters:   19 78   19 70   19 67         Learning Assessment:  :     Learning Assessment 10/28/2014   PRIMARY LEARNER Patient   HIGHEST LEVEL OF EDUCATION - PRIMARY LEARNER  GRADUATED HIGH SCHOOL OR GED   BARRIERS PRIMARY LEARNER NONE   CO-LEARNER CAREGIVER No   PRIMARY LANGUAGE ENGLISH    NEED No   LEARNER PREFERENCE PRIMARY LISTENING   LEARNING SPECIAL TOPICS no   ANSWERED BY self   RELATIONSHIP SELF   ASSESSMENT COMMENT no       Depression Screening:  :     3 most recent PHQ Screens 2019   Little interest or pleasure in doing things Not at all   Feeling down, depressed, irritable, or hopeless Not at all   Total Score PHQ 2 0   Trouble falling or staying asleep, or sleeping too much -   Feeling tired or having little energy -   Poor appetite, weight loss, or overeating -   Feeling bad about yourself - or that you are a failure or have let yourself or your family down -   Trouble concentrating on things such as school, work, reading, or watching TV -   Moving or speaking so slowly that other people could have noticed; or the opposite being so fidgety that others notice - Thoughts of being better off dead, or hurting yourself in some way -   PHQ 9 Score -   How difficult have these problems made it for you to do your work, take care of your home and get along with others -       Fall Risk Assessment:  :     Fall Risk Assessment, last 12 mths 1/22/2019   Able to walk? Yes   Fall in past 12 months? Yes   Fall with injury? No   Number of falls in past 12 months 6   Fall Risk Score 6       Abuse Screening:  :     Abuse Screening Questionnaire 1/22/2019 4/9/2018 10/28/2014   Do you ever feel afraid of your partner? N N N   Are you in a relationship with someone who physically or mentally threatens you? N N N   Is it safe for you to go home? Y Y Y       Coordination of Care Questionnaire:  :     1) Have you been to an emergency room, urgent care clinic since your last visit? no   Hospitalized since your last visit? no             2) Have you seen or consulted any other health care providers outside of [address] [place] [address] since your last visit? no  (Include any pap smears or colon screenings in this section.)    3) Do you have an Advance Directive on file? no  Are you interested in receiving information about Advance Directives? no    Reviewed record in preparation for visit and have obtained necessary documentation. Medication reconciliation up to date and corrected with patient at this time.

## 2019-08-05 ENCOUNTER — TELEPHONE (OUTPATIENT)
Dept: FAMILY MEDICINE CLINIC | Age: 74
End: 2019-08-05

## 2019-08-05 DIAGNOSIS — J01.90 ACUTE RHINOSINUSITIS: Primary | ICD-10-CM

## 2019-08-05 RX ORDER — AMOXICILLIN AND CLAVULANATE POTASSIUM 875; 125 MG/1; MG/1
1 TABLET, FILM COATED ORAL 2 TIMES DAILY
Qty: 14 TAB | Refills: 0 | Status: SHIPPED | OUTPATIENT
Start: 2019-08-05 | End: 2019-08-12

## 2019-08-05 NOTE — TELEPHONE ENCOUNTER
Patient states that the OTC Claritin has helped some but she still thinks she may have some sinus problems. Patient is having headaches and has some dizziness when walking.  Patient uses [facility]

## 2019-08-05 NOTE — TELEPHONE ENCOUNTER
Requested Prescriptions     Signed Prescriptions Disp Refills    amoxicillin-clavulanate (AUGMENTIN) 875-125 mg per tablet 14 Tab 0     Sig: Take 1 Tab by mouth two (2) times a day for 7 days. Take with food. Authorizing Provider: Mat Parra     Start Augmentin as above. Return to clinic for reevaluation if no improvement noted.

## 2019-08-05 NOTE — TELEPHONE ENCOUNTER
The pt is calling because she would like to talk about her symptoms she is having that she feels have gotten worse.  She states she is hurting so bad to walk

## 2019-08-17 LAB
T4 FREE SERPL-MCNC: 1.21 NG/DL (ref 0.82–1.77)
TSH SERPL DL<=0.005 MIU/L-ACNC: 18.66 UIU/ML (ref 0.45–4.5)

## 2019-08-21 DIAGNOSIS — E03.9 HYPOTHYROIDISM, UNSPECIFIED TYPE: ICD-10-CM

## 2019-08-22 ENCOUNTER — TELEPHONE (OUTPATIENT)
Dept: FAMILY MEDICINE CLINIC | Age: 74
End: 2019-08-22

## 2019-08-22 RX ORDER — LEVOTHYROXINE SODIUM 75 UG/1
TABLET ORAL
Qty: 30 TAB | Refills: 0 | Status: SHIPPED | OUTPATIENT
Start: 2019-08-22 | End: 2019-09-23 | Stop reason: SDUPTHER

## 2019-08-22 NOTE — TELEPHONE ENCOUNTER
LVM saying that a 30 day supply of levothyroxine was sent to [id number] and the rest of the medication was sent to [facility].  If patient has questions she may call back

## 2019-08-22 NOTE — TELEPHONE ENCOUNTER
Pt is having issues with filling a medication at the pharmacy and wants to talk with nurse asap  Call pt at [phone]

## 2019-08-30 DIAGNOSIS — F41.9 ANXIETY: ICD-10-CM

## 2019-09-17 ENCOUNTER — TELEPHONE (OUTPATIENT)
Dept: FAMILY MEDICINE CLINIC | Age: 74
End: 2019-09-17

## 2019-09-17 DIAGNOSIS — E03.9 HYPOTHYROIDISM, UNSPECIFIED TYPE: ICD-10-CM

## 2019-09-17 DIAGNOSIS — E78.2 MIXED HYPERLIPIDEMIA: ICD-10-CM

## 2019-09-17 DIAGNOSIS — F32.A DEPRESSION, UNSPECIFIED DEPRESSION TYPE: ICD-10-CM

## 2019-09-17 DIAGNOSIS — K21.9 GASTROESOPHAGEAL REFLUX DISEASE WITHOUT ESOPHAGITIS: ICD-10-CM

## 2019-09-17 DIAGNOSIS — M15.9 PRIMARY OSTEOARTHRITIS INVOLVING MULTIPLE JOINTS: ICD-10-CM

## 2019-09-17 DIAGNOSIS — E11.9 TYPE 2 DIABETES MELLITUS WITHOUT COMPLICATION, WITHOUT LONG-TERM CURRENT USE OF INSULIN (HCC): ICD-10-CM

## 2019-09-17 DIAGNOSIS — F41.9 ANXIETY: ICD-10-CM

## 2019-09-17 NOTE — TELEPHONE ENCOUNTER
Spoke with Mrs. Lai who was did seem confused and slow to speak but she stated that she was not hurt nor feeling bad, she just felt confused. She stated that her son was at the store and would be home soon. Attempted many different numbers to try and get in touch with him. LVM at [phone] (number was read off to me by Mrs. Lai.)

## 2019-09-17 NOTE — TELEPHONE ENCOUNTER
Pt called and stated that she has been over dosing for the last week or so she thinks and said she has slept for four days straight and confused and not sure if she has really been over dosing or not.  informed pt to have someone take her to the ER asap to be evaluated.  offered to call the ambulance for pt or family member and pt stated she was going to call her son to take her to the ER once she hung up with office.  Pt seemed to be talking slow and not really making sense

## 2019-09-18 RX ORDER — VENLAFAXINE HYDROCHLORIDE 75 MG/1
CAPSULE, EXTENDED RELEASE ORAL
Qty: 90 CAP | Refills: 1 | Status: CANCELLED | OUTPATIENT
Start: 2019-09-18

## 2019-09-18 RX ORDER — ATORVASTATIN CALCIUM 40 MG/1
TABLET, FILM COATED ORAL
Qty: 90 TAB | Refills: 1 | Status: CANCELLED | OUTPATIENT
Start: 2019-09-18

## 2019-09-18 RX ORDER — FENOFIBRIC ACID 135 MG/1
CAPSULE, DELAYED RELEASE ORAL
Qty: 90 CAP | Refills: 1 | Status: CANCELLED | OUTPATIENT
Start: 2019-09-18

## 2019-09-18 RX ORDER — OMEPRAZOLE 40 MG/1
CAPSULE, DELAYED RELEASE ORAL
Qty: 90 CAP | Refills: 1 | Status: CANCELLED | OUTPATIENT
Start: 2019-09-18

## 2019-09-18 RX ORDER — VENLAFAXINE HYDROCHLORIDE 150 MG/1
CAPSULE, EXTENDED RELEASE ORAL
Qty: 90 CAP | Refills: 1 | Status: CANCELLED | OUTPATIENT
Start: 2019-09-18

## 2019-09-18 RX ORDER — CELECOXIB 200 MG/1
200 CAPSULE ORAL DAILY
Qty: 90 CAP | Refills: 1 | Status: CANCELLED | OUTPATIENT
Start: 2019-09-18

## 2019-09-18 RX ORDER — BUPROPION HYDROCHLORIDE 150 MG/1
TABLET ORAL
Qty: 90 TAB | Refills: 2 | Status: CANCELLED | OUTPATIENT
Start: 2019-09-18

## 2019-09-18 RX ORDER — LEVOTHYROXINE SODIUM 75 UG/1
TABLET ORAL
Qty: 30 TAB | Refills: 0 | Status: CANCELLED | OUTPATIENT
Start: 2019-09-18

## 2019-09-18 RX ORDER — BUSPIRONE HYDROCHLORIDE 10 MG/1
10 TABLET ORAL 2 TIMES DAILY
Qty: 180 TAB | Refills: 1 | Status: CANCELLED | OUTPATIENT
Start: 2019-09-18

## 2019-09-18 NOTE — TELEPHONE ENCOUNTER
Patient does not want to be on [facility] pack anymore and would like to discuss getting them sent to [facility] instead. Patient is coming in Monday.

## 2019-09-23 ENCOUNTER — OFFICE VISIT (OUTPATIENT)
Dept: FAMILY MEDICINE CLINIC | Age: 74
End: 2019-09-23

## 2019-09-23 VITALS
OXYGEN SATURATION: 96 % | TEMPERATURE: 98.1 F | SYSTOLIC BLOOD PRESSURE: 120 MMHG | HEIGHT: 63 IN | HEART RATE: 69 BPM | DIASTOLIC BLOOD PRESSURE: 62 MMHG | WEIGHT: 168 LBS | BODY MASS INDEX: 29.77 KG/M2 | RESPIRATION RATE: 18 BRPM

## 2019-09-23 DIAGNOSIS — M15.9 PRIMARY OSTEOARTHRITIS INVOLVING MULTIPLE JOINTS: ICD-10-CM

## 2019-09-23 DIAGNOSIS — F32.A DEPRESSION, UNSPECIFIED DEPRESSION TYPE: ICD-10-CM

## 2019-09-23 DIAGNOSIS — F41.9 ANXIETY: ICD-10-CM

## 2019-09-23 DIAGNOSIS — K21.9 GASTROESOPHAGEAL REFLUX DISEASE WITHOUT ESOPHAGITIS: ICD-10-CM

## 2019-09-23 DIAGNOSIS — E78.2 MIXED HYPERLIPIDEMIA: ICD-10-CM

## 2019-09-23 DIAGNOSIS — E11.9 TYPE 2 DIABETES MELLITUS WITHOUT COMPLICATION, WITHOUT LONG-TERM CURRENT USE OF INSULIN (HCC): ICD-10-CM

## 2019-09-23 DIAGNOSIS — E03.9 HYPOTHYROIDISM, UNSPECIFIED TYPE: ICD-10-CM

## 2019-09-23 RX ORDER — LEVOTHYROXINE SODIUM 75 UG/1
TABLET ORAL
Qty: 90 TAB | Refills: 1 | Status: SHIPPED | OUTPATIENT
Start: 2019-09-23 | End: 2019-12-10 | Stop reason: SDUPTHER

## 2019-09-23 RX ORDER — CELECOXIB 200 MG/1
200 CAPSULE ORAL DAILY
Qty: 90 CAP | Refills: 1 | Status: SHIPPED | OUTPATIENT
Start: 2019-09-23 | End: 2019-12-10 | Stop reason: SDUPTHER

## 2019-09-23 RX ORDER — OMEPRAZOLE 40 MG/1
40 CAPSULE, DELAYED RELEASE ORAL DAILY
Qty: 90 CAP | Refills: 1 | Status: SHIPPED | OUTPATIENT
Start: 2019-09-23 | End: 2019-12-10 | Stop reason: SDUPTHER

## 2019-09-23 RX ORDER — BUSPIRONE HYDROCHLORIDE 10 MG/1
10 TABLET ORAL 2 TIMES DAILY
Qty: 180 TAB | Refills: 1 | Status: SHIPPED | OUTPATIENT
Start: 2019-09-23 | End: 2019-11-14 | Stop reason: SDUPTHER

## 2019-09-23 RX ORDER — FENOFIBRIC ACID 135 MG/1
135 CAPSULE, DELAYED RELEASE ORAL DAILY
Qty: 90 CAP | Refills: 1 | Status: SHIPPED | OUTPATIENT
Start: 2019-09-23 | End: 2019-12-10 | Stop reason: SDUPTHER

## 2019-09-23 RX ORDER — ATORVASTATIN CALCIUM 40 MG/1
40 TABLET, FILM COATED ORAL DAILY
Qty: 90 TAB | Refills: 1 | Status: SHIPPED | OUTPATIENT
Start: 2019-09-23 | End: 2019-12-10 | Stop reason: SDUPTHER

## 2019-09-23 RX ORDER — VENLAFAXINE HYDROCHLORIDE 150 MG/1
150 CAPSULE, EXTENDED RELEASE ORAL DAILY
Qty: 90 CAP | Refills: 1 | Status: SHIPPED | OUTPATIENT
Start: 2019-09-23 | End: 2019-12-10 | Stop reason: SDUPTHER

## 2019-09-23 RX ORDER — VENLAFAXINE HYDROCHLORIDE 75 MG/1
CAPSULE, EXTENDED RELEASE ORAL
Qty: 90 CAP | Refills: 1 | Status: SHIPPED | OUTPATIENT
Start: 2019-09-23 | End: 2019-12-10 | Stop reason: SDUPTHER

## 2019-09-23 RX ORDER — BUPROPION HYDROCHLORIDE 150 MG/1
TABLET ORAL
Qty: 90 TAB | Refills: 1 | Status: SHIPPED | OUTPATIENT
Start: 2019-09-23 | End: 2019-12-10 | Stop reason: SDUPTHER

## 2019-09-23 NOTE — PROGRESS NOTES
Subjective: Bear Connolly is a 74 y.o. female here today with her son for ED follow up. Recent visit to [facility] for fatigue after finding that she was inadvertently taking double of her medications. Apparently was taking double the dose of her levothyroxine that she is aware of. Per son, lab work unremarkable and she was discharged home. She would like to have all of her medications sent to her local pharmacy from now on. Her son will help with medications - he will arrange a pillbox and make sure that she takes medications as prescribed. Currently feeling ok. Denies headache, chest pain, dyspnea. She does nap during the day which is not unusual - states that she does not typically fall asleep until 1-2 AM and does watch TV while in bed. Current Outpatient Medications   Medication Sig Dispense Refill    levothyroxine (SYNTHROID) 75 mcg tablet Take 1 Tab by mouth Daily (before breakfast). 30 Tab 0    atorvastatin (LIPITOR) 40 mg tablet TAKE ONE TABLET BY MOUTH DAILY 90 Tab 1    venlafaxine-SR (EFFEXOR-XR) 150 mg capsule TAKE ONE CAPSULE BY MOUTH DAILY. Take with 75 mg capsule. 90 Cap 1    omeprazole (PRILOSEC) 40 mg capsule Take 1 Cap by mouth daily. 90 Cap 1    fenofibric acid (TRILIPIX ER) 135 mg capsule TAKE ONE CAPSULE BY MOUTH DAILY 90 Cap 1    celecoxib (CELEBREX) 200 mg capsule Take 1 Cap by mouth daily. Indications: OSTEOARTHRITIS 90 Cap 1    SITagliptin (JANUVIA) 25 mg tablet TAKE ONE TABLET BY MOUTH DAILY 90 Tab 1    buPROPion XL (WELLBUTRIN XL) 150 mg tablet Take 1 Tab by mouth daily. 90 Tab 2    venlafaxine-SR (EFFEXOR-XR) 75 mg capsule Take 1 Cap by mouth daily. 90 Cap 1    busPIRone (BUSPAR) 10 mg tablet Take 1 Tab by mouth two (2) times a day. 180 Tab 1    vitamin D3-vitamin K2, MK4, 1,000-100 unit-mcg tab Take 5,000 Int'l Units by mouth daily.          Allergies   Allergen Reactions    Gabapentin Drowsiness    Deserpidine Other (comments)     Puts pressure around her head like a ring       Past Medical History:   Diagnosis Date    Depression     Hypercholesterolemia     BERTRAM on CPAP     Dr. Baltazar Monroe     Thyroid disease     Urinary incontinence        Social History     Tobacco Use    Smoking status: Never Smoker    Smokeless tobacco: Never Used   Substance Use Topics    Alcohol use: No        Review of Systems  Pertinent items are noted in HPI. Objective:     Visit Vitals  /62 (BP 1 Location: Right arm, BP Patient Position: Sitting) Comment: Manual   Pulse 69   Temp 98.1 °F (36.7 °C) (Oral) Comment: .   Resp 18   Ht 5' 3\" (1.6 m)   Wt 168 lb (76.2 kg)   SpO2 96%   BMI 29.76 kg/m²      General appearance - alert, well appearing, and in no distress  Eyes - pupils equal and reactive, extraocular eye movements intact, sclera anicteric  Oropharyngx - mucous membranes moist, pharynx normal without lesions  Neck - supple, no significant adenopathy  Chest - clear to auscultation, no wheezes, rales or rhonchi, symmetric air entry, no tachypnea, retractions or cyanosis  Heart - normal rate, regular rhythm, normal S1, S2, no murmurs, rubs, clicks or gallops  Neurological - alert, oriented, normal speech, no focal findings or movement disorder noted  Mental Status: normal mood, behavior, speech, dress, motor activity, and thought processes    Assessment/Plan:   Sharmila Bartholomew is a 74 y.o. female seen for ED follow up. All medications have been sent to her local pharmacy per her request. Will request summary of most recent ED visit. Agree with her son assisting with medications and she is agreeable to this as well. 1. Depression, unspecified depression type  - venlafaxine-SR (EFFEXOR-XR) 75 mg capsule; Take 1 Cap by mouth daily. Dispense: 90 Cap; Refill: 1  - venlafaxine-SR (EFFEXOR-XR) 150 mg capsule  Dispense: 90 Cap; Refill: 1    2.  Type 2 diabetes mellitus without complication, without long-term current use of insulin (HCC)  - SITagliptin (JANUVIA) 25 mg tablet; TAKE ONE TABLET BY MOUTH DAILY  Dispense: 90 Tab; Refill: 1    3. Gastroesophageal reflux disease without esophagitis  - omeprazole (PRILOSEC) 40 mg capsule  Dispense: 90 Cap; Refill: 1    4. Hypothyroidism, unspecified type  - levothyroxine (SYNTHROID) 75 mcg tablet; Take 1 Tab by mouth Daily (before breakfast). Dispense: 30 Tab; Refill: 0    5. Mixed hyperlipidemia  - fenofibric acid (TRILIPIX ER) 135 mg capsule  Dispense: 90 Cap; Refill: 1  - atorvastatin (LIPITOR) 40 mg tablet  Dispense: 90 Tab; Refill: 1    6. Primary osteoarthritis involving multiple joints  - celecoxib (CELEBREX) 200 mg capsule; Take 1 Cap by mouth daily. Indications: joint damage causing pain and loss of function  Dispense: 90 Cap; Refill: 1    7. Anxiety  - busPIRone (BUSPAR) 10 mg tablet; Take 1 Tab by mouth two (2) times a day. Dispense: 180 Tab; Refill: 1    I have discussed the diagnosis with the patient and the intended plan as seen in the above orders. The patient has received an after-visit summary and questions were answered concerning future plans. I have discussed medication side effects and warnings with the patient as well. Patient verbalizes understanding of plan of care and denies further questions or concerns at this time. Informed patient to return to the office if symptoms worsen or if new symptoms arise.

## 2019-09-23 NOTE — PROGRESS NOTES
Identified pt with two pt identifiers(name and ).     Chief Complaint   Patient presents with   [facility] Follow Up        Health Maintenance Due   Topic    DTaP/Tdap/Td series (1 - Tdap)    BREAST CANCER SCRN MAMMOGRAM     Shingrix Vaccine Age 49> (2 of 2)    Influenza Age 5 to Adult     MEDICARE YEARLY EXAM        Wt Readings from Last 3 Encounters:   19 168 lb (76.2 kg)   19 165 lb (74.8 kg)   19 167 lb (75.8 kg)     Temp Readings from Last 3 Encounters:   19 98.1 °F (36.7 °C) (Oral)   19 98.5 °F (36.9 °C) (Oral)   19 97.8 °F (36.6 °C) (Oral)     BP Readings from Last 3 Encounters:   19 120/62   19 120/80   19 124/80     Pulse Readings from Last 3 Encounters:   19 69   19 78   19 70         Learning Assessment:  :     Learning Assessment 10/28/2014   PRIMARY LEARNER Patient   HIGHEST LEVEL OF EDUCATION - PRIMARY LEARNER  GRADUATED HIGH SCHOOL OR GED   BARRIERS PRIMARY LEARNER NONE   CO-LEARNER CAREGIVER No   PRIMARY LANGUAGE ENGLISH    NEED No   LEARNER PREFERENCE PRIMARY LISTENING   LEARNING SPECIAL TOPICS no   ANSWERED BY self   RELATIONSHIP SELF   ASSESSMENT COMMENT no       Depression Screening:  :     3 most recent PHQ Screens 2019   Little interest or pleasure in doing things Not at all   Feeling down, depressed, irritable, or hopeless Not at all   Total Score PHQ 2 0   Trouble falling or staying asleep, or sleeping too much -   Feeling tired or having little energy -   Poor appetite, weight loss, or overeating -   Feeling bad about yourself - or that you are a failure or have let yourself or your family down -   Trouble concentrating on things such as school, work, reading, or watching TV -   Moving or speaking so slowly that other people could have noticed; or the opposite being so fidgety that others notice -   Thoughts of being better off dead, or hurting yourself in some way -   PHQ 9 Score -   How difficult have these problems made it for you to do your work, take care of your home and get along with others -       Fall Risk Assessment:  :     Fall Risk Assessment, last 12 mths 1/22/2019   Able to walk? Yes   Fall in past 12 months? Yes   Fall with injury? No   Number of falls in past 12 months 6   Fall Risk Score 6       Abuse Screening:  :     Abuse Screening Questionnaire 1/22/2019 4/9/2018 10/28/2014   Do you ever feel afraid of your partner? N N N   Are you in a relationship with someone who physically or mentally threatens you? N N N   Is it safe for you to go home? Aamir Ribeiro       Coordination of Care Questionnaire:  :     1) Have you been to an emergency room, urgent care clinic since your last visit? Yes  Hospitalized since your last visit? no             2) Have you seen or consulted any other health care providers outside of [address] [place] [address] since your last visit? no  (Include any pap smears or colon screenings in this section.)    3) Do you have an Advance Directive on file? no  Are you interested in receiving information about Advance Directives? no    Patient is accompanied by son I have received verbal consent from Sam Suazo to discuss any/all medical information while they are present in the room. Reviewed record in preparation for visit and have obtained necessary documentation. Medication reconciliation up to date and corrected with patient at this time.

## 2019-10-24 ENCOUNTER — OFFICE VISIT (OUTPATIENT)
Dept: FAMILY MEDICINE CLINIC | Age: 74
End: 2019-10-24

## 2019-10-24 VITALS
OXYGEN SATURATION: 98 % | RESPIRATION RATE: 18 BRPM | SYSTOLIC BLOOD PRESSURE: 122 MMHG | DIASTOLIC BLOOD PRESSURE: 70 MMHG | BODY MASS INDEX: 30.48 KG/M2 | HEIGHT: 63 IN | HEART RATE: 62 BPM | TEMPERATURE: 98.4 F | WEIGHT: 172 LBS

## 2019-10-24 DIAGNOSIS — Z00.00 MEDICARE ANNUAL WELLNESS VISIT, SUBSEQUENT: Primary | ICD-10-CM

## 2019-10-24 NOTE — PROGRESS NOTES
Identified pt with two pt identifiers(name and ).     Chief Complaint   Patient presents with   [address] Maintenance Due   Topic    DTaP/Tdap/Td series (1 - Tdap)    BREAST CANCER SCRN MAMMOGRAM     Shingrix Vaccine Age 50> (2 of 2)    MEDICARE YEARLY EXAM     HEMOGLOBIN A1C Q6M        Wt Readings from Last 3 Encounters:   10/24/19 172 lb (78 kg)   19 168 lb (76.2 kg)   19 165 lb (74.8 kg)     Temp Readings from Last 3 Encounters:   10/24/19 98.4 °F (36.9 °C) (Oral)   19 98.1 °F (36.7 °C) (Oral)   19 98.5 °F (36.9 °C) (Oral)     BP Readings from Last 3 Encounters:   10/24/19 122/70   19 120/62   19 120/80     Pulse Readings from Last 3 Encounters:   10/24/19 62   19 69   19 78         Learning Assessment:  :     Learning Assessment 10/28/2014   PRIMARY LEARNER Patient   HIGHEST LEVEL OF EDUCATION - PRIMARY LEARNER  GRADUATED HIGH SCHOOL OR GED   BARRIERS PRIMARY LEARNER NONE   CO-LEARNER CAREGIVER No   PRIMARY LANGUAGE ENGLISH    NEED No   LEARNER PREFERENCE PRIMARY LISTENING   LEARNING SPECIAL TOPICS no   ANSWERED BY self   RELATIONSHIP SELF   ASSESSMENT COMMENT no       Depression Screening:  :     3 most recent PHQ Screens 2019   Little interest or pleasure in doing things Not at all   Feeling down, depressed, irritable, or hopeless Not at all   Total Score PHQ 2 0   Trouble falling or staying asleep, or sleeping too much -   Feeling tired or having little energy -   Poor appetite, weight loss, or overeating -   Feeling bad about yourself - or that you are a failure or have let yourself or your family down -   Trouble concentrating on things such as school, work, reading, or watching TV -   Moving or speaking so slowly that other people could have noticed; or the opposite being so fidgety that others notice -   Thoughts of being better off dead, or hurting yourself in some way -   PHQ 9 Score -   How difficult have these problems made it for you to do your work, take care of your home and get along with others -       Fall Risk Assessment:  :     Fall Risk Assessment, last 12 mths 10/24/2019   Able to walk? Yes   Fall in past 12 months? No   Fall with injury? -   Number of falls in past 12 months -   Fall Risk Score -       Abuse Screening:  :     Abuse Screening Questionnaire 10/24/2019 1/22/2019 4/9/2018 10/28/2014   Do you ever feel afraid of your partner? N N N N   Are you in a relationship with someone who physically or mentally threatens you? N N N N   Is it safe for you to go home? Y Y Y Y       Coordination of Care Questionnaire:  :     1) Have you been to an emergency room, urgent care clinic since your last visit? no   Hospitalized since your last visit? no             2) Have you seen or consulted any other health care providers outside of [address] [place] [address] since your last visit? no  (Include any pap smears or colon screenings in this section.)    3) Do you have an Advance Directive on file? no  Are you interested in receiving information about Advance Directives? no    Reviewed record in preparation for visit and have obtained necessary documentation. Medication reconciliation up to date and corrected with patient at this time.

## 2019-10-24 NOTE — PROGRESS NOTES
This is the Subsequent Medicare Annual Wellness Exam, performed 12 months or more after the Initial AWV or the last Subsequent AWV    I have reviewed the patient's medical history in detail and updated the computerized patient record. History     Past Medical History:   Diagnosis Date    Depression     Hypercholesterolemia     BERTRAM on CPAP     Dr. Roxie Primrose     Thyroid disease     Urinary incontinence         Past Surgical History:   Procedure Laterality Date    ABDOMEN SURGERY PROC UNLISTED      HX COLONOSCOPY      HX ENDOSCOPY      HX GYN      HX HEENT      HX ORTHOPAEDIC         Current Outpatient Medications   Medication Sig Dispense Refill    venlafaxine-SR (EFFEXOR-XR) 75 mg capsule Take 1 Cap by mouth daily. 90 Cap 1    venlafaxine-SR (EFFEXOR-XR) 150 mg capsule Take 1 Cap by mouth daily. 90 Cap 1    SITagliptin (JANUVIA) 25 mg tablet TAKE ONE TABLET BY MOUTH DAILY 90 Tab 1    omeprazole (PRILOSEC) 40 mg capsule Take 1 Cap by mouth daily. 90 Cap 1    levothyroxine (SYNTHROID) 75 mcg tablet Take 1 Tab by mouth Daily (before breakfast). 90 Tab 1    fenofibric acid (TRILIPIX ER) 135 mg capsule Take 1 Cap by mouth daily. 90 Cap 1    celecoxib (CELEBREX) 200 mg capsule Take 1 Cap by mouth daily. Indications: joint damage causing pain and loss of function 90 Cap 1    busPIRone (BUSPAR) 10 mg tablet Take 1 Tab by mouth two (2) times a day. 180 Tab 1    buPROPion XL (WELLBUTRIN XL) 150 mg tablet Take 1 Tab by mouth daily. 90 Tab 1    atorvastatin (LIPITOR) 40 mg tablet Take 1 Tab by mouth daily. 90 Tab 1    vitamin D3-vitamin K2, MK4, 1,000-100 unit-mcg tab Take 5,000 Int'l Units by mouth daily.          Allergies   Allergen Reactions    Gabapentin Drowsiness    Deserpidine Other (comments)     Puts pressure around her head like a ring       Family History   Problem Relation Age of Onset    No Known Problems Mother     No Known Problems Father        Social History     Tobacco Use    Smoking status: Never Smoker    Smokeless tobacco: Never Used   Substance Use Topics    Alcohol use: No       Patient Active Problem List   Diagnosis Code    Hypothyroid E03.9    HLD (hyperlipidemia) E78.5    Depression F32.9    Anxiety F41.9    Incontinence of urine in female R32    OA (osteoarthritis) M19.90    Type II or unspecified type diabetes mellitus without mention of complication, not stated as uncontrolled E11.9    BERTRAM on CPAP G47.33, Z99.89       Depression Risk Factor Screening:     3 most recent PHQ Screens 1/22/2019   Little interest or pleasure in doing things Not at all   Feeling down, depressed, irritable, or hopeless Not at all   Total Score PHQ 2 0   Trouble falling or staying asleep, or sleeping too much -   Feeling tired or having little energy -   Poor appetite, weight loss, or overeating -   Feeling bad about yourself - or that you are a failure or have let yourself or your family down -   Trouble concentrating on things such as school, work, reading, or watching TV -   Moving or speaking so slowly that other people could have noticed; or the opposite being so fidgety that others notice -   Thoughts of being better off dead, or hurting yourself in some way -   PHQ 9 Score -   How difficult have these problems made it for you to do your work, take care of your home and get along with others -       Alcohol Risk Factor Screening: You do not drink alcohol or very rarely. Functional Ability and Level of Safety:   Hearing Loss  Hearing is fair.      Activities of Daily Living  The home contains: handrails and shower chair, cane, wheelchair   ADL Assessment 10/24/2019   Feeding yourself No Help Needed   Getting from bed to chair No Help Needed   Getting dressed No Help Needed   Bathing or showering No Help Needed   Walk across the room (includes cane/walker) No Help Needed   Using the telphone No Help Needed   Taking your medications No Help Needed   Preparing meals Help Needed Managing money (expenses/bills) Help Needed   Moderately strenuous housework (laundry) Help Needed   Shopping for personal items (toiletries/medicines) Help Needed   Shopping for groceries Help Needed   Driving Help Needed   Climbing a flight of stairs Help Needed   Getting to places beyond walking distances Help Needed       Fall Risk  Fall Risk Assessment, last 12 mths 10/24/2019   Able to walk? Yes   Fall in past 12 months? No   Fall with injury? -   Number of falls in past 12 months -   Fall Risk Score -       Abuse Screen   Abuse Screening Questionnaire 10/24/2019   Do you ever feel afraid of your partner? N   Are you in a relationship with someone who physically or mentally threatens you? N   Is it safe for you to go home? Y         Cognitive Screening   Evaluation of Cognitive Function:  Has your family/caregiver stated any concerns about your memory: yes  Abnormal    Patient Care Team   Patient Care Team:  Melvi Valderrama MD as PCP - General (Family Practice)  Jorge Concepcion MD as Physician (Nephrology)  Alba Dueñas RN as Nurse Navigator  Thong Wheeler MD as Physician (Neurology)  Rk Kaminski OD as Physician (Optometry)    Assessment/Plan   Education and counseling provided:  Influenza Vaccine - discussed and she would prefer to have at her local pharmacy   Screening Mammography - has had and record requested   Screening for glaucoma    Diagnoses and all orders for this visit:    1.  Medicare annual wellness visit, subsequent        Health Maintenance Due   Topic Date Due    DTaP/Tdap/Td series (1 - Tdap) 10/02/2006    BREAST CANCER SCRN MAMMOGRAM  11/09/2017    Shingrix Vaccine Age 50> (2 of 2) 10/05/2018    MEDICARE YEARLY EXAM  08/04/2019    HEMOGLOBIN A1C Q6M  11/08/2019

## 2019-10-24 NOTE — ACP (ADVANCE CARE PLANNING)
Advance Care Planning (ACP) Provider Conversation Snapshot    Date of ACP Conversation: 10/24/19  Persons included in Conversation:  patient and family  Length of ACP Conversation in minutes:  <16 minutes (Non-Billable)    Authorized Decision Maker (if patient is incapable of making informed decisions): This person is: Other Legally Authorized Decision Maker (e.g. Next of Kin) - she has a son and two daughters      For Patients with Decision Making Capacity:   Values/Goals: Exploration of values, goals, and preferences if recovery is not expected, even with continued medical treatment in the event of:  Imminent death  Severe, permanent brain injury  \"In these circumstances, what matters most to you? \"  Care focused more on comfort or quality of life.     Conversation Outcomes / Follow-Up Plan:   Recommended completion of Advance Directive form after review of ACP materials and conversation with prospective healthcare agent   Entered DNR order (If yes, complete Durable DNR form)  Recommended communicating the plan and making copies for the healthcare agent, personal physician, and others as appropriate (e.g., health system)

## 2019-10-24 NOTE — PATIENT INSTRUCTIONS
Medicare Wellness Visit, Female The best way to live healthy is to have a lifestyle where you eat a well-balanced diet, exercise regularly, limit alcohol use, and quit all forms of tobacco/nicotine, if applicable. Regular preventive services are another way to keep healthy. Preventive services (vaccines, screening tests, monitoring & exams) can help personalize your care plan, which helps you manage your own care. Screening tests can find health problems at the earliest stages, when they are easiest to treat. [facility] follows the current, evidence-based guidelines published by the [facility] Chidi Fernando ([facility]) when recommending preventive services for our patients. Because we follow these guidelines, sometimes recommendations change over time as research supports it. (For example, mammograms used to be recommended annually. Even though Medicare will still pay for an annual mammogram, the newer guidelines recommend a mammogram every two years for women of average risk.) Of course, you and your doctor may decide to screen more often for some diseases, based on your risk and your health status. Preventive services for you include: - Medicare offers their members a free annual wellness visit, which is time for you and your primary care provider to discuss and plan for your preventive service needs. Take advantage of this benefit every year! 
-All adults over the age of 72 should receive the recommended pneumonia vaccines. Current USPSTF guidelines recommend a series of two vaccines for the best pneumonia protection.  
-All adults should have a flu vaccine yearly and a tetanus vaccine every 10 years. All adults age 61 and older should receive a shingles vaccine once in their lifetime.   
-A bone mass density test is recommended when a woman turns 65 to screen for osteoporosis. This test is only recommended one time, as a screening. Some providers will use this same test as a disease monitoring tool if you already have osteoporosis. -All adults age 38-68 who are overweight should have a diabetes screening test once every three years.  
-Other screening tests and preventive services for persons with diabetes include: an eye exam to screen for diabetic retinopathy, a kidney function test, a foot exam, and stricter control over your cholesterol.  
-Cardiovascular screening for adults with routine risk involves an electrocardiogram (ECG) at intervals determined by your doctor.  
-Colorectal cancer screenings should be done for adults age 54-65 with no increased risk factors for colorectal cancer. There are a number of acceptable methods of screening for this type of cancer. Each test has its own benefits and drawbacks. Discuss with your doctor what is most appropriate for you during your annual wellness visit. The different tests include: colonoscopy (considered the best screening method), a fecal occult blood test, a fecal DNA test, and sigmoidoscopy. -Breast cancer screenings are recommended every other year for women of normal risk, age 54-69. 
-Cervical cancer screenings for women over age 72 are only recommended with certain risk factors.  
-All adults born between [facility] should be screened once for Hepatitis C. Here is a list of your current Health Maintenance items (your personalized list of preventive services) with a due date: 
Health Maintenance Due Topic Date Due  
 DTaP/Tdap/Td  (1 - Tdap) 10/02/2006  Mammogram  11/09/2017  Shingles Vaccine (2 of 2) 10/05/2018 [address] [place] [address] Annual Well Visit  08/04/2019  Hemoglobin A1C    11/08/2019

## 2019-11-13 DIAGNOSIS — F41.9 ANXIETY: ICD-10-CM

## 2019-11-13 NOTE — TELEPHONE ENCOUNTER
Pt stated she has called [facility] to fill her prescriptions instead of local pharmacy. Pt also stated she had a long talk with her son and they have decided to move separately. She has called [facility] in [place] in regard to moving.

## 2019-11-14 NOTE — TELEPHONE ENCOUNTER
----- Message from Frank Schmidt sent at 11/14/2019  8:02 AM EST -----  Regarding: Dr. Adamaris Santos Refill   Pt is needing a refill on Buspirone 10mg 2 times day to be sent over to [address].  Fax number: [phone] Please follow up with Kenroy Burden for any question: [phone]

## 2019-11-15 DIAGNOSIS — E11.9 TYPE 2 DIABETES MELLITUS WITHOUT COMPLICATION, WITHOUT LONG-TERM CURRENT USE OF INSULIN (HCC): ICD-10-CM

## 2019-11-15 DIAGNOSIS — F32.A DEPRESSION, UNSPECIFIED DEPRESSION TYPE: ICD-10-CM

## 2019-11-15 RX ORDER — BUSPIRONE HYDROCHLORIDE 10 MG/1
10 TABLET ORAL 2 TIMES DAILY
Qty: 180 TAB | Refills: 1 | Status: SHIPPED | OUTPATIENT
Start: 2019-11-15 | End: 2020-05-18

## 2019-11-21 ENCOUNTER — OFFICE VISIT (OUTPATIENT)
Dept: NEUROLOGY | Age: 74
End: 2019-11-21

## 2019-11-21 DIAGNOSIS — F32.A ANXIETY AND DEPRESSION: ICD-10-CM

## 2019-11-21 DIAGNOSIS — R47.89 WORD FINDING DIFFICULTY: ICD-10-CM

## 2019-11-21 DIAGNOSIS — G31.84 MILD COGNITIVE IMPAIRMENT: Primary | ICD-10-CM

## 2019-11-21 DIAGNOSIS — R41.3 MEMORY LOSS: ICD-10-CM

## 2019-11-21 DIAGNOSIS — F41.9 ANXIETY AND DEPRESSION: ICD-10-CM

## 2019-11-21 NOTE — PROGRESS NOTES
[address] [facility],[address]  [address]   [address] [place] [address] [facility]   [address]   [phone] Office   [phone] Fax      Neuropsychology    Initial Diagnostic Interview Note      Referral:  Duy Dee MD    Katina Eubanks is a 66 y.o. right handed   female who was unaccompanied to the initial clinical interview on 11/21/19. Please refer to her medical records for details pertaining to her history. Briefly, the patient reported that she completed the 12th grade without history of previously diagnosed LD and/or receipt of special education services. Her son lives with her and is supposed to be there to help her. They end up having a lot of arguments. She notes that they argue about what day it is, what time it is, when her appointments. She forgets the content of conversations. She has been getting progressively worse over the past year. Can tell her something now and she will forget a few hours later. It started slowly and has been progressive. IT has been very gradual for about five years and much worse over the past year. She has not driven since 5768 because she realized that she had some cognitive issues and some physical issues and didn't want to have an accident. In general, she takes her own medications, but is not very good at taking them at the same time every day. She does take her thyroid medication first thing in the morning. Most of the bills are autopay, and she does not write checks really. She has problems remembering recipes, has left the stove on, but she does try. She is independent for her ADLs. She has been having increased balance problems as well, and just recently pulled her grown . She has been having falls. I note that she was seen at [facility] ED for fatigue and it turned out she was taking double of her medications. She was discharged to home.   She has depression, and has been struggling with depression all of her life. She is on medication for depression and anxiety, prescribed by PCP. She has seen a counselor in the past.  She sleeps about three hours at a time and then gets up, watches some tv, and naps. Sometimes, she will read. Appetite is good. She has no background known stroke, meningitis/encephalitis, AUGUSTA, Fever, Lupus, Lyme, TBI, sz, etc.  She did have a syncope episode There is a question of stroke here but it was not formally diagnosed. She had some minor memory issues before her spouse  in , but really noticed it after that and worsening. The question of pseudodementia is raised. Her grandmother had dementia (paternal). No other known history of dementia. She does lose some words here today, and loses train of thought. She has an excellent sense of humor here today. See also records in chart and history below. No previous neuropsych.      Neuropsychological Mental Status Exam (NMSE):  Historian: Good  Praxis: No UE apraxia  R/L Orientation: Intact to self and to other  Dress: within normal limits   Weight: Overweight  Appearance/Hygiene: within normal limits   Gait: within normal limits   Assistive Devices: Glasses  Mood: within normal limits   Affect: within normal limits   Comprehension: within normal limits   Thought Process: within normal limits   Expressive Language: Mild word finding  Receptive Language: within normal limits   Motor:  No cognitive or motor perseveration  ETOH: Denied  Tobacco: Denied  Illicit: Denied  SI/HI: Denied  Psychosis: Denied  Insight: Within normal limits  Judgment: Within normal limits  Other Psych:      Past Medical History:   Diagnosis Date    Depression     Hypercholesterolemia     BERTRAM on CPAP     Dr. Jamie Layton     Thyroid disease     Urinary incontinence        Past Surgical History:   Procedure Laterality Date    ABDOMEN SURGERY PROC UNLISTED      HX COLONOSCOPY  HX ENDOSCOPY      HX GYN      HX HEENT      HX ORTHOPAEDIC         Allergies   Allergen Reactions    Gabapentin Drowsiness    Deserpidine Other (comments)     Puts pressure around her head like a ring       Family History   Problem Relation Age of Onset    No Known Problems Mother     No Known Problems Father        Social History     Tobacco Use    Smoking status: Never Smoker    Smokeless tobacco: Never Used   Substance Use Topics    Alcohol use: No    Drug use: No       Current Outpatient Medications   Medication Sig Dispense Refill    busPIRone (BUSPAR) 10 mg tablet Take 1 Tab by mouth two (2) times a day. 180 Tab 1    venlafaxine-SR (EFFEXOR-XR) 75 mg capsule Take 1 Cap by mouth daily. 90 Cap 1    venlafaxine-SR (EFFEXOR-XR) 150 mg capsule Take 1 Cap by mouth daily. 90 Cap 1    SITagliptin (JANUVIA) 25 mg tablet TAKE ONE TABLET BY MOUTH DAILY 90 Tab 1    omeprazole (PRILOSEC) 40 mg capsule Take 1 Cap by mouth daily. 90 Cap 1    levothyroxine (SYNTHROID) 75 mcg tablet Take 1 Tab by mouth Daily (before breakfast). 90 Tab 1    fenofibric acid (TRILIPIX ER) 135 mg capsule Take 1 Cap by mouth daily. 90 Cap 1    celecoxib (CELEBREX) 200 mg capsule Take 1 Cap by mouth daily. Indications: joint damage causing pain and loss of function 90 Cap 1    buPROPion XL (WELLBUTRIN XL) 150 mg tablet Take 1 Tab by mouth daily. 90 Tab 1    atorvastatin (LIPITOR) 40 mg tablet Take 1 Tab by mouth daily. 90 Tab 1    vitamin D3-vitamin K2, MK4, 1,000-100 unit-mcg tab Take 5,000 Int'l Units by mouth daily. Plan:  Obtain authorization for testing from insurance company. Report to follow once testing, scoring, and interpretation completed. ? Organic based neurocognitive issues versus mood disorder or combination of same. ? Problems organic, functional, or both? This note will not be viewable in [address]

## 2019-12-02 DIAGNOSIS — E03.9 HYPOTHYROIDISM, UNSPECIFIED TYPE: ICD-10-CM

## 2019-12-02 RX ORDER — LEVOTHYROXINE SODIUM 75 UG/1
TABLET ORAL
Qty: 90 TAB | Refills: 1 | Status: CANCELLED | OUTPATIENT
Start: 2019-12-02

## 2019-12-10 ENCOUNTER — OFFICE VISIT (OUTPATIENT)
Dept: FAMILY MEDICINE CLINIC | Age: 74
End: 2019-12-10

## 2019-12-10 ENCOUNTER — OFFICE VISIT (OUTPATIENT)
Dept: NEUROLOGY | Age: 74
End: 2019-12-10

## 2019-12-10 VITALS
OXYGEN SATURATION: 98 % | BODY MASS INDEX: 30.48 KG/M2 | HEIGHT: 63 IN | WEIGHT: 172 LBS | TEMPERATURE: 98.7 F | RESPIRATION RATE: 18 BRPM | DIASTOLIC BLOOD PRESSURE: 72 MMHG | HEART RATE: 78 BPM | SYSTOLIC BLOOD PRESSURE: 122 MMHG

## 2019-12-10 DIAGNOSIS — S76.211A GROIN STRAIN, RIGHT, INITIAL ENCOUNTER: Primary | ICD-10-CM

## 2019-12-10 DIAGNOSIS — F32.1 MODERATE MAJOR DEPRESSION (HCC): ICD-10-CM

## 2019-12-10 DIAGNOSIS — R45.851 PASSIVE SUICIDAL IDEATIONS: ICD-10-CM

## 2019-12-10 DIAGNOSIS — F45.0 ANXIETY WITH SOMATIZATION: ICD-10-CM

## 2019-12-10 DIAGNOSIS — G30.1 LATE ONSET ALZHEIMER'S DISEASE WITHOUT BEHAVIORAL DISTURBANCE (HCC): Primary | ICD-10-CM

## 2019-12-10 DIAGNOSIS — E03.9 HYPOTHYROIDISM, UNSPECIFIED TYPE: ICD-10-CM

## 2019-12-10 DIAGNOSIS — E11.9 TYPE 2 DIABETES MELLITUS WITHOUT COMPLICATION, WITHOUT LONG-TERM CURRENT USE OF INSULIN (HCC): ICD-10-CM

## 2019-12-10 DIAGNOSIS — F43.9 STRESS AT HOME: ICD-10-CM

## 2019-12-10 DIAGNOSIS — F32.A DEPRESSION, UNSPECIFIED DEPRESSION TYPE: ICD-10-CM

## 2019-12-10 DIAGNOSIS — M15.9 PRIMARY OSTEOARTHRITIS INVOLVING MULTIPLE JOINTS: ICD-10-CM

## 2019-12-10 DIAGNOSIS — K21.9 GASTROESOPHAGEAL REFLUX DISEASE WITHOUT ESOPHAGITIS: ICD-10-CM

## 2019-12-10 DIAGNOSIS — F02.80 LATE ONSET ALZHEIMER'S DISEASE WITHOUT BEHAVIORAL DISTURBANCE (HCC): Primary | ICD-10-CM

## 2019-12-10 DIAGNOSIS — F41.9 ANXIETY WITH SOMATIZATION: ICD-10-CM

## 2019-12-10 DIAGNOSIS — E78.2 MIXED HYPERLIPIDEMIA: ICD-10-CM

## 2019-12-10 RX ORDER — FENOFIBRIC ACID 135 MG/1
135 CAPSULE, DELAYED RELEASE ORAL DAILY
Qty: 90 CAP | Refills: 1 | Status: SHIPPED | OUTPATIENT
Start: 2019-12-10 | End: 2020-06-10 | Stop reason: SDUPTHER

## 2019-12-10 RX ORDER — VENLAFAXINE HYDROCHLORIDE 75 MG/1
CAPSULE, EXTENDED RELEASE ORAL
Qty: 90 CAP | Refills: 1 | Status: SHIPPED | OUTPATIENT
Start: 2019-12-10 | End: 2020-04-17

## 2019-12-10 RX ORDER — OMEPRAZOLE 40 MG/1
40 CAPSULE, DELAYED RELEASE ORAL DAILY
Qty: 90 CAP | Refills: 1 | Status: SHIPPED | OUTPATIENT
Start: 2019-12-10 | End: 2020-06-10 | Stop reason: SDUPTHER

## 2019-12-10 RX ORDER — CELECOXIB 200 MG/1
200 CAPSULE ORAL DAILY
Qty: 90 CAP | Refills: 1 | Status: SHIPPED | OUTPATIENT
Start: 2019-12-10 | End: 2020-08-14

## 2019-12-10 RX ORDER — ATORVASTATIN CALCIUM 40 MG/1
40 TABLET, FILM COATED ORAL DAILY
Qty: 90 TAB | Refills: 1 | Status: SHIPPED | OUTPATIENT
Start: 2019-12-10 | End: 2020-05-28 | Stop reason: DRUGHIGH

## 2019-12-10 RX ORDER — BUPROPION HYDROCHLORIDE 150 MG/1
TABLET ORAL
Qty: 90 TAB | Refills: 1 | Status: SHIPPED | OUTPATIENT
Start: 2019-12-10 | End: 2020-06-10 | Stop reason: SDUPTHER

## 2019-12-10 RX ORDER — VENLAFAXINE HYDROCHLORIDE 150 MG/1
150 CAPSULE, EXTENDED RELEASE ORAL DAILY
Qty: 90 CAP | Refills: 1 | Status: SHIPPED | OUTPATIENT
Start: 2019-12-10 | End: 2020-06-10 | Stop reason: SDUPTHER

## 2019-12-10 RX ORDER — LEVOTHYROXINE SODIUM 75 UG/1
TABLET ORAL
Qty: 90 TAB | Refills: 1 | Status: SHIPPED | OUTPATIENT
Start: 2019-12-10 | End: 2020-08-14

## 2019-12-10 NOTE — PROGRESS NOTES
Identified pt with two pt identifiers(name and ). Chief Complaint   Patient presents with   [facility] Follow Up     seen [facility] for grion strain.  2019        Health Maintenance Due   Topic    DTaP/Tdap/Td series (1 - Tdap)    BREAST CANCER SCRN MAMMOGRAM     Shingrix Vaccine Age 50> (2 of 2)    HEMOGLOBIN A1C Q6M        Wt Readings from Last 3 Encounters:   12/10/19 172 lb (78 kg)   10/24/19 172 lb (78 kg)   19 168 lb (76.2 kg)     Temp Readings from Last 3 Encounters:   12/10/19 98.7 °F (37.1 °C) (Oral)   10/24/19 98.4 °F (36.9 °C) (Oral)   19 98.1 °F (36.7 °C) (Oral)     BP Readings from Last 3 Encounters:   12/10/19 122/72   10/24/19 122/70   19 120/62     Pulse Readings from Last 3 Encounters:   12/10/19 78   10/24/19 62   19 69         Learning Assessment:  :     Learning Assessment 10/28/2014   PRIMARY LEARNER Patient   HIGHEST LEVEL OF EDUCATION - PRIMARY LEARNER  GRADUATED HIGH SCHOOL OR GED   BARRIERS PRIMARY LEARNER NONE   CO-LEARNER CAREGIVER No   PRIMARY LANGUAGE ENGLISH    NEED No   LEARNER PREFERENCE PRIMARY LISTENING   LEARNING SPECIAL TOPICS no   ANSWERED BY self   RELATIONSHIP SELF   ASSESSMENT COMMENT no       Depression Screening:  :     3 most recent PHQ Screens 2019   Little interest or pleasure in doing things Not at all   Feeling down, depressed, irritable, or hopeless Not at all   Total Score PHQ 2 0   Trouble falling or staying asleep, or sleeping too much -   Feeling tired or having little energy -   Poor appetite, weight loss, or overeating -   Feeling bad about yourself - or that you are a failure or have let yourself or your family down -   Trouble concentrating on things such as school, work, reading, or watching TV -   Moving or speaking so slowly that other people could have noticed; or the opposite being so fidgety that others notice -   Thoughts of being better off dead, or hurting yourself in some way -   PHQ 9 Score - How difficult have these problems made it for you to do your work, take care of your home and get along with others -       Fall Risk Assessment:  :     Fall Risk Assessment, last 12 mths 10/24/2019   Able to walk? Yes   Fall in past 12 months? No   Fall with injury? -   Number of falls in past 12 months -   Fall Risk Score -       Abuse Screening:  :     Abuse Screening Questionnaire 10/24/2019 1/22/2019 4/9/2018 10/28/2014   Do you ever feel afraid of your partner? N N N N   Are you in a relationship with someone who physically or mentally threatens you? N N N N   Is it safe for you to go home? Y Y Y Y       Coordination of Care Questionnaire:  :     1) Have you been to an emergency room, urgent care clinic since your last visit? no   Hospitalized since your last visit? no             2) Have you seen or consulted any other health care providers outside of [address] [place] [address] since your last visit? no  (Include any pap smears or colon screenings in this section.)    3) Do you have an Advance Directive on file? no  Are you interested in receiving information about Advance Directives? no    Reviewed record in preparation for visit and have obtained necessary documentation. Medication reconciliation up to date and corrected with patient at this time.

## 2019-12-10 NOTE — LETTER
12/12/19 Patient: Slade Davis YOB: 1945 Date of Visit: 12/10/2019 Charlie Parson MD 
[address] [facility] [phone] VIA In Basket Dear Charlie Parson MD, Thank you for referring Ms. Elisa Camara to [facility] for evaluation. My notes for this consultation are attached. If you have questions, please do not hesitate to call me. I look forward to following your patient along with you. Sincerely, Denny Burnette PsyD

## 2019-12-10 NOTE — PATIENT INSTRUCTIONS
Groin Strain: Care Instructions Your Care Instructions A groin strain is an injury that happens when you tear or overstretch (pull) a groin muscle. The groin muscles are in the area on either side of the body in the folds where the belly joins the legs. You can strain a groin muscle during exercise, such as running, skating, kicking in soccer, or playing basketball. It can happen when you lift, push, or pull heavy objects. You might pull a groin muscle when you fall. The injury can range from a minor pull to a more serious tear of the muscle. You may feel pain and tenderness that's worse when you squeeze your legs together. You may also have pain when you raise the knee of the injured side. There may be swelling or bruising in the groin area or inner thigh. If you have a bad strain, you may walk with a limp while it heals. Rest and other home care can help the muscle heal. Healing can take up to 3 weeks or more. Your doctor may want to see you again in 2 to 3 weeks. Follow-up care is a key part of your treatment and safety. Be sure to make and go to all appointments, and call your doctor if you are having problems. It's also a good idea to know your test results and keep a list of the medicines you take. How can you care for yourself at home? · Be safe with medicines. Read and follow all instructions on the label. ? If the doctor gave you a prescription medicine for pain, take it as prescribed. ? If you are not taking a prescription pain medicine, ask your doctor if you can take an over-the-counter medicine. · Rest and protect your injured or sore groin area for 1 to 2 weeks. Stop, change, or take a break from any activity that may be causing your pain or soreness. Do not do intense activities while you still have pain. · Put ice or a cold pack on your groin area for 10 to 20 minutes at a time.  Try to do this every 1 to 2 hours for the next 3 days (when you are awake) or until the swelling goes down. Put a thin cloth between the ice and your skin. · After 2 or 3 days, if your swelling is gone, apply heat. Put a warm water bottle, a heating pad set on low, or a warm cloth on your groin area. Do not go to sleep with a heating pad on your skin. · If your doctor gave you crutches, make sure you use them as directed. · Wear snug shorts or underwear that support the injured area. When should you call for help? Call your doctor now or seek immediate medical care if: 
  · You have new or severe pain or swelling in the groin area.  
  · Your groin or upper thigh is cool or pale or changes color.  
  · You have tingling, weakness, or numbness in your groin or leg.  
  · You cannot move your leg.  
  · You cannot put weight on your leg.  
 Watch closely for changes in your health, and be sure to contact your doctor if: 
  · You do not get better as expected. Where can you learn more? Go to http://celestina-dontae.info/. Enter E095 in the search box to learn more about \"Groin Strain: Care Instructions. \" Current as of: June 26, 2019 Content Version: 12.2 © 3747-0000 [employer]. Care instructions adapted under license by [employer] (which disclaims liability or warranty for this information). If you have questions about a medical condition or this instruction, always ask your healthcare professional. [place] [address] any warranty or liability for your use of this information.

## 2019-12-10 NOTE — PROGRESS NOTES
Subjective: Maddi Sloan is a 66 y.o. female here for ED follow up. Evaluated at Urgent Care [address] [place] [address] on 11/19/19 and diagnosed with right groin strain. Occurred after she extended her right leg awkwardly while hanging curtains. Still with pain which is exacerbated with movement and going up the stairs. Pain is burning and painful. She has been taking Flexeril and 2 OTC Advil tablets PRN for pain. Requesting for medications to be sent to [address] [place] [address]. Current Outpatient Medications   Medication Sig Dispense Refill    busPIRone (BUSPAR) 10 mg tablet Take 1 Tab by mouth two (2) times a day. 180 Tab 1    venlafaxine-SR (EFFEXOR-XR) 75 mg capsule Take 1 Cap by mouth daily. 90 Cap 1    venlafaxine-SR (EFFEXOR-XR) 150 mg capsule Take 1 Cap by mouth daily. 90 Cap 1    SITagliptin (JANUVIA) 25 mg tablet TAKE ONE TABLET BY MOUTH DAILY 90 Tab 1    omeprazole (PRILOSEC) 40 mg capsule Take 1 Cap by mouth daily. 90 Cap 1    levothyroxine (SYNTHROID) 75 mcg tablet Take 1 Tab by mouth Daily (before breakfast). 90 Tab 1    fenofibric acid (TRILIPIX ER) 135 mg capsule Take 1 Cap by mouth daily. 90 Cap 1    celecoxib (CELEBREX) 200 mg capsule Take 1 Cap by mouth daily. Indications: joint damage causing pain and loss of function 90 Cap 1    buPROPion XL (WELLBUTRIN XL) 150 mg tablet Take 1 Tab by mouth daily. 90 Tab 1    atorvastatin (LIPITOR) 40 mg tablet Take 1 Tab by mouth daily. 90 Tab 1    vitamin D3-vitamin K2, MK4, 1,000-100 unit-mcg tab Take 5,000 Int'l Units by mouth daily.          Allergies   Allergen Reactions    Gabapentin Drowsiness    Deserpidine Other (comments)     Puts pressure around her head like a ring       Past Medical History:   Diagnosis Date    Depression     Hypercholesterolemia     BERTRAM on CPAP     Dr. Wilmer Mike     Thyroid disease     Urinary incontinence        Social History     Tobacco Use    Smoking status: Never Smoker    Smokeless tobacco: Never Used Substance Use Topics    Alcohol use: No        Review of Systems  Pertinent items are noted in HPI. Objective:     Visit Vitals  /72 (BP 1 Location: Right arm, BP Patient Position: Sitting) Comment: Manual   Pulse 78   Temp 98.7 °F (37.1 °C) (Oral) Comment: .   Resp 18   Ht 5' 3\" (1.6 m)   Wt 172 lb (78 kg)   SpO2 98%   BMI 30.47 kg/m²      General appearance - alert, well appearing, and in no distress  Chest - clear to auscultation, no wheezes, rales or rhonchi, symmetric air entry, no tachypnea, retractions or cyanosis  Heart - normal rate, regular rhythm, normal S1, S2, no murmurs, rubs, clicks or gallops  Extremities - peripheral pulses normal, no pedal edema, no clubbing or cyanosis  Musculoskeletal - right groin TTP, gait slightly antalgic, 5/5 strength BLE    Assessment/Plan:   Katina Eubanks is a 66 y.o. female seen for:     1. Groin strain, right, initial encounter: will request urgent care visit notes. Continue with PRN Advil, gentle stretches discussed, may apply heat 2-3 times daily for 10-15 minutes. Discussed Ortho evaluation if no improvement. 2. Depression, unspecified depression type  - venlafaxine-SR (EFFEXOR-XR) 75 mg capsule; Take 1 Cap by mouth daily. Dispense: 90 Cap; Refill: 1  - venlafaxine-SR (EFFEXOR-XR) 150 mg capsule; Take 1 Cap by mouth daily. Dispense: 90 Cap; Refill: 1  - buPROPion XL (WELLBUTRIN XL) 150 mg tablet; Take 1 Tab by mouth daily. Dispense: 90 Tab; Refill: 1    3. Type 2 diabetes mellitus without complication, without long-term current use of insulin (HCC)  - SITagliptin (JANUVIA) 25 mg tablet; TAKE ONE TABLET BY MOUTH DAILY  Dispense: 90 Tab; Refill: 1    4. Gastroesophageal reflux disease without esophagitis  - omeprazole (PRILOSEC) 40 mg capsule; Take 1 Cap by mouth daily. Dispense: 90 Cap; Refill: 1    5. Hypothyroidism, unspecified type  - levothyroxine (SYNTHROID) 75 mcg tablet; Take 1 Tab by mouth Daily (before breakfast).   Dispense: 90 Tab; Refill: 1    6. Mixed hyperlipidemia  - fenofibric acid (TRILIPIX ER) 135 mg capsule; Take 1 Cap by mouth daily. Dispense: 90 Cap; Refill: 1  - atorvastatin (LIPITOR) 40 mg tablet; Take 1 Tab by mouth daily. Dispense: 90 Tab; Refill: 1    7. Primary osteoarthritis involving multiple joints  - celecoxib (CELEBREX) 200 mg capsule; Take 1 Cap by mouth daily. Indications: joint damage causing pain and loss of function  Dispense: 90 Cap; Refill: 1      I have discussed the diagnosis with the patient and the intended plan as seen in the above orders. The patient has received an after-visit summary and questions were answered concerning future plans. I have discussed medication side effects and warnings with the patient as well. Patient verbalizes understanding of plan of care and denies further questions or concerns at this time. Informed patient to return to the office if symptoms worsen or if new symptoms arise. Follow-up and Dispositions    · Return in about 3 weeks (around 12/31/2019) for follow up or sooner as needed.

## 2019-12-12 NOTE — PROGRESS NOTES
[address] [facility],[address]  [address]   [place] [address] [facility]   [phone] Office   [phone] Fax      Neuropsychological Evaluation Report    Referral:  Chandrakant Cooley MD    Alvaro Bill is a 66 y.o. right handed   female who was unaccompanied to the initial clinical interview on 11/21/19. Please refer to her medical records for details pertaining to her history. Briefly, the patient reported that she completed the 12th grade without history of previously diagnosed LD and/or receipt of special education services. Her son lives with her and is supposed to be there to help her. They end up having a lot of arguments. She notes that they argue about what day it is, what time it is, when her appointments. She forgets the content of conversations. She has been getting progressively worse over the past year. Can tell her something now and she will forget a few hours later. It started slowly and has been progressive. IT has been very gradual for about five years and much worse over the past year. She has not driven since 7045 because she realized that she had some cognitive issues and some physical issues and didn't want to have an accident. In general, she takes her own medications, but is not very good at taking them at the same time every day. She does take her thyroid medication first thing in the morning. Most of the bills are autopay, and she does not write checks really. She has problems remembering recipes, has left the stove on, but she does try. She is independent for her ADLs. She has been having increased balance problems as well, and just recently pulled her grown . She has been having falls. I note that she was seen at [address] [facility] for fatigue and it turned out she was taking double of her medications. She was discharged to home.   She has depression, and has been struggling with depression all of her life. She is on medication for depression and anxiety, prescribed by PCP. She has seen a counselor in the past.  She sleeps about three hours at a time and then gets up, watches some tv, and naps. Sometimes, she will read. Appetite is good. She has no background known stroke, meningitis/encephalitis, [place], Fever, Lupus, Lyme, TBI, sz, etc.  She did have a syncope episode There is a question of stroke here but it was not formally diagnosed. She had some minor memory issues before her spouse  in , but really noticed it after that and worsening. The question of pseudodementia is raised. Her grandmother had dementia (paternal). No other known history of dementia. She does lose some words here today, and loses train of thought. She has an excellent sense of humor here today. See also records in chart and history below. No previous neuropsych.      Neuropsychological Mental Status Exam (NMSE):  Historian: Good  Praxis: No UE apraxia  R/L Orientation: Intact to self and to other  Dress: within normal limits   Weight: Overweight  Appearance/Hygiene: within normal limits   Gait: within normal limits   Assistive Devices: Glasses  Mood: within normal limits   Affect: within normal limits   Comprehension: within normal limits   Thought Process: within normal limits   Expressive Language: Mild word finding  Receptive Language: within normal limits   Motor:  No cognitive or motor perseveration  ETOH: Denied  Tobacco: Denied  Illicit: Denied  SI/HI: Denied  Psychosis: Denied  Insight: Within normal limits  Judgment: Within normal limits  Other Psych:      Past Medical History:   Diagnosis Date    Depression     Hypercholesterolemia     BERTRAM on CPAP     Dr. Vipul Rosenbaum     Thyroid disease     Urinary incontinence        Past Surgical History:   Procedure Laterality Date    ABDOMEN SURGERY PROC UNLISTED      HX COLONOSCOPY      HX ENDOSCOPY      HX GYN      HX HEENT      HX ORTHOPAEDIC         Allergies   Allergen Reactions    Gabapentin Drowsiness    Deserpidine Other (comments)     Puts pressure around her head like a ring       Family History   Problem Relation Age of Onset    No Known Problems Mother     No Known Problems Father        Social History     Tobacco Use    Smoking status: Never Smoker    Smokeless tobacco: Never Used   Substance Use Topics    Alcohol use: No    Drug use: No       Current Outpatient Medications   Medication Sig Dispense Refill    venlafaxine-SR (EFFEXOR-XR) 75 mg capsule Take 1 Cap by mouth daily. 90 Cap 1    venlafaxine-SR (EFFEXOR-XR) 150 mg capsule Take 1 Cap by mouth daily. 90 Cap 1    SITagliptin (JANUVIA) 25 mg tablet TAKE ONE TABLET BY MOUTH DAILY 90 Tab 1    omeprazole (PRILOSEC) 40 mg capsule Take 1 Cap by mouth daily. 90 Cap 1    levothyroxine (SYNTHROID) 75 mcg tablet Take 1 Tab by mouth Daily (before breakfast). 90 Tab 1    fenofibric acid (TRILIPIX ER) 135 mg capsule Take 1 Cap by mouth daily. 90 Cap 1    celecoxib (CELEBREX) 200 mg capsule Take 1 Cap by mouth daily. Indications: joint damage causing pain and loss of function 90 Cap 1    buPROPion XL (WELLBUTRIN XL) 150 mg tablet Take 1 Tab by mouth daily. 90 Tab 1    atorvastatin (LIPITOR) 40 mg tablet Take 1 Tab by mouth daily. 90 Tab 1    busPIRone (BUSPAR) 10 mg tablet Take 1 Tab by mouth two (2) times a day. 180 Tab 1    vitamin D3-vitamin K2, MK4, 1,000-100 unit-mcg tab Take 5,000 Int'l Units by mouth daily. Plan:  Obtain authorization for testing from insurance company. Report to follow once testing, scoring, and interpretation completed. ? Organic based neurocognitive issues versus mood disorder or combination of same. ? Problems organic, functional, or both? This note will not be viewable in [address]       Neuropsychological Test Results  Patient Testing 12/10/19 Report Completed 12/12/19  A Psychometrist Assisted w/ portions of this evaluation while under my direct  supervision    The following evaluation procedures/tests were administered:      Neuropsychologist Performed, Interpreted, & Reported:  Neuropsychological Mental Status Exam, Revised Memory & Behavior Checklist,  Mini Mental Status Exam, Clock Drawing Test, Alicia-Melzack Pain Questionnaire, Test Of Premorbid Functioning, [place] Functional Living Scale, History Taking  & Clinical Interview With The Patient,KOKO, CPT-III, Review Of Available Records. Psychometrist Administered under Neuropsychologist Supervision & Neuropsychologist Interpreted & Neuropsychologist Reported:  Verbal Fluency Tests, David & David - Revised, Trailmaking Test Parts A & B, Wechsler Adult Intelligence Scale - IV, [place] Verbal Learning Test - 3, Grooved Pegboard, Muniz Depression Inventory - II, Muniz Anxiety Inventory. Test Findings:  Test Findings:  Note:  The patients raw data have been compared with currently available norms which include demographic corrections for age, gender, and/or education. Sometimes, the patients scores are compared to demographically similar individuals as close to the patients age, education level, etc., as possible. \"Average\" is viewed as being +/- 1 standard deviation (SD) from the stated mean for a particular test score. \"Low average\" is viewed as being between 1 and 2 SD below the mean, and above average is viewed as being 1 and 2 SD above the mean. Scores falling in the borderline range (between 1-1/2 and 2 SD below the mean) are viewed with particular attention as to whether they are normal or abnormal neurocognitive test scores. Other methods of inference in analyzing the test data are also utilized, including the pattern and range of scores in the profile, bilateral motor functions, and the presence, if any, of pathognomonic signs.         Behaviorally, the patient was friendly and cooperative and appeared motivated to perform well during this examination. Within this context, the results of this evaluation are viewed as a valid reflection of the patients actual neurocognitive and emotional status. The patient's score of 23/30 on the Mini-Mental Status Exam was impaired. In this regard, she was not oriented to date or county. Recall for three words after a brief delay was 1/3 correct. Repetition was impaired. Visual construction was impaired. Writing was impaired. Clock drawing was mildly impaired in that the hands were incorrectly set. Her structured word list fluency, as assessed by the FAS Test, was within the mildly impaired range with a T score of 37. Category fluency was within the mildly to moderately impaired range with a T score of 33. Confrontation naming ability, as assessed by the [facility] Test - Revised, was within the average range at 50/60 correct (T = 46). This pattern of performance is  indicative of a patient who is at increased risk for day-to-day problems with verbal fluency and confrontation naming was normal.       The patient was administered the [facility] Continuous Performance Test - III and review of the subscales within this instrument revealed severe concerns for inattentiveness without impulsivity. This pattern of performance is indicative of a patient who is at increased risk for day-to-day problems with sustained visual attention/concentration. The patient is showing problems with working memory capacity (3rd %ile) and processing speed (8th %ile) on the WAIS-IV. Her Verbal Comprehension Index score of 93 was within the average range. Her Perceptual Reasoning Index score of 81 was low average. Working memory and processing speed scores reflect a decline in functioning based on an assessment of premorbid functioning.         The patient was administered the [place] Verbal Learning Test  - 3 and generated an impaired range (and positive) learning curve over five repeated auditory word list learning trials. An interference trial was impaired. Free and cued, short and long delayed recall were all impaired. Recognition recall was impaired . Forced choice recall was normal, suggesting good effort on this test.   This pattern of performance is indicative of a patient who is at increased risk for day-to-day problems with auditory learning and memory. Simple timed visual motor sequencing (Trailmaking Test Part A) was within the average range with a T score of 51. Her performance on a similar, but more complex task of timed visual motor sequencing (Trailmaking Test Part B) was within the moderately to severely impaired range with a T score of 20. This latter test was discontinued. This pattern of performance is indicative of a patient who is at increased risk for day-to-day problems with executive functioning. Fine motor dexterity was within the mildly to moderately impaired range bilaterally. This does not raise strong concern for a particularly lateralized brain dysfunction but neurologic correlation is indicated. The patient was also administered the [address], an assessment of day-to-day adaptive neurocognitive functioning abilities. Her Total T score of 32 is within the mildly impaired range. Problems with Money and Calculation (3-9th %ile), communication (3-9th %ile), and memory (3-9th %ile) are all noted. This is suggestive of day-to-day problems with adaptive memory, money and calculation, and communication abilities  There is a need for day-to-day supervision in these domains. The patient rated her current level of pain as \"3/5- Distressing\" on the Alicia-Melzack Pain Questionnaire. She reported pain in her right upper leg. Her Muniz Depression Inventory -II score of 8 was within the minimally depressed range. Her Muniz Anxiety Inventory score of 8 reflected mild anxiety.    The patient's responses on the Personality Assessment Inventory were deemed valid for interpretation. Within this context, she is reporting marked depression as well as anxiety, and there is strong evidence of somatization here. Maladaptive behavior patterns aimed at controlling anxiety are present. She has specific fears surrounding certain situations. She is a rigid individual who follows her personal guidelines for conduct in an inflexible and unyielding manner. Self-concept is harsh and negative, and notable day-to-day stress and turmoil is reported . Family relationships appear ridden with conflict and interventions there may be of some use in alleviating a major source of stress. She reports transient thoughts of self-harm on this measure and denied any active plan or intent with me. Her level of treatment motivation is low. Despite her acknowledgement that a number of areas of her life are not going well at this time, her responses suggest resistance to the idea that personal changes are needed. She reports concern for psychosis on the KOKO but denied this on interview. Impressions & Recommendations: This is an abnormal range Neuropsychological Evaluation with respect to neurocognitive functioning. In this regard, she is showing impairments with mental status, verbal fluency, visual attention, auditory learning, auditory memory, processing speed, working memory, bilateral motor skills, and executive functioning. Day-to-day adaptive difficulties with money, memory, and communication are seen. Her verbal comprehension, perceptual reasoning, and casual language skills remain areas of important strength which mask underlying cognitive deficits at times. Emotionally, there is concern for moderate depression and moderate anxiety with somatization. She also reports passive thoughts of self-harm and denied active plan or intent, notable day-to-day stress at home, and she also reports psychosis on a measure assessing mood but denied this on interview. Her level of insight is poor. In my opinion, this appears to be a case of mild to moderate ementia exacerbated by depression, anxiety, stress, and related physical concerns. The profile is not consistent with a pure pseudodementia, though certainly her mood issues and stresses and physical problems will enhance her underlying neurocognitive deficits. Psychosis needs to be more fully explored and ruled out, as she denied this on interview and acknowledged same during testing. I suggest consideration for medication for memory, attention, depression, and anxiety. A Psychiatry consult would be wise. Supportive counseling is advised, and monitor for any escalation in currently passive suicidal thinking. Respite care and psychoeducation for the son is also advised, as they clearly have communication problems complicated greatly by her dementia and her interpersonal style (See personality profile above). Engage, perhaps, with the Alzheimer's [employer] as one potential resource. She should be encouraged to remain as mentally, socially, and physically active as possible. I do not find her competent/capable to make informed medical decisions, financial decisions, to vote, to , or to own a firearm. A POA should be established if this has not been done so already. She also likely requires supervision for those domains pertaining to memory. This includes nutritional/meal preparation supervision, day-to-day household safety supervision,  medication management supervision and supervision of financial dealings. I would recommend she not drive. Consider in home health nursing level care at this juncture. It seems that significant stress and arguing at home has to do with her dementia and her need for control and her lack of insight, but I do not know the son and of course additional factors could be present as well.   This does raise current living arrangement/concerns, especially given that dementia is progressive. Hopefully, improving her mood can improve some aspects of her day-to-day functioning, though. Baseline now established. Follow up prn. Clinical correlation is, of course, indicated. I will discuss these findings with the patient when she follows up with me in the near future. A follow up Neuropsychological Evaluation is indicated on a prn basis, especially if there are any cognitive and/or emotional changes. DIAGNOSES:  Dementia - Mild To Moderate     Major Depression - Moderate     Anxiety With Somatization - Moderate     Passive Suicidal Ideation     Rule out Psychosis     Stress at Home (at least in part due to her dementia)     The above information is based upon information currently available to me. If there is any additional information of which I am currently unaware, I would be more than happy to review it upon having it made available to me. Thank you for the opportunity to see this interesting individual.     Sincerely,       Rajani Barrera. Hank Yoon PsyD, EdS    CC:  Suzette Baldwin MD    Time Documentation:    85056*9 62758*3    49677 x 1  90280 x 5 Test Administration/Data Gathering By Technician: (3 hours). 90024 x 1 (first 30 minutes), 94737 x 5 (each additional 30 minutes)    96132 x 1  96133 x 1 Testing Evaluation Services by Neuropsychologist (1 hour, 50 minutes) 96132 x 1 (first hour), 96133 x 1 (50 minutes)    Definitions:      52161/80722:  Neurobehavioral Status Exam, Clinical interview. Clinical assessment of thinking, reasoning and judgment, by neuropsychologist, both face to face time with patient and time interpreting those test results and reporting, first and subsequent hours)    87987/29911: Neuropsychological Test Administration by Technician/Psychometrist, first 30 minutes and each additional 30 minutes. The above includes: Record review. Review of history provided by patient. Review of collaborative information. Testing by Clinician.   Review of raw data. Scoring. Report writing of individual tests administered by Clinician. Integration of individual tests administered by psychometrist with NSE/testing by clinician, review of records/history/collaborative information, case Conceptualization, treatment planning, clinical decision making, report writing, coordination Of Care. Psychometry test codes as time spent by psychometrist administering and scoring neurocognitive/psychological tests under supervision of neuropsychologist.  Integral services including scoring of raw data, data interpretation, case conceptualization, report writing etcetera were initiated after the patient finished testing/raw data collected and was completed on the date the report was signed.

## 2019-12-20 ENCOUNTER — TELEPHONE (OUTPATIENT)
Dept: FAMILY MEDICINE CLINIC | Age: 74
End: 2019-12-20

## 2019-12-20 NOTE — TELEPHONE ENCOUNTER
Patient is calling and stated that she is having severe pain in her right leg. She would like to know what Dr. Maria Esther Coombs wants her do.   Please call at [phone]

## 2019-12-27 ENCOUNTER — TELEPHONE (OUTPATIENT)
Dept: FAMILY MEDICINE CLINIC | Age: 74
End: 2019-12-27

## 2020-01-03 ENCOUNTER — TELEPHONE (OUTPATIENT)
Dept: FAMILY MEDICINE CLINIC | Age: 75
End: 2020-01-03

## 2020-01-03 NOTE — TELEPHONE ENCOUNTER
Pt asking for flexeril 5mg that she was given at the hospital not on pt's chart and when I told pt she would need to be seen to see doctor to document why she was on medication - pt stated she has one pill left for pain and she will figure out something as she has an appt Monday and hung up the phone before  could say anything else or verify what pharmacy to use

## 2020-01-06 ENCOUNTER — OFFICE VISIT (OUTPATIENT)
Dept: FAMILY MEDICINE CLINIC | Age: 75
End: 2020-01-06

## 2020-01-06 VITALS
WEIGHT: 168 LBS | RESPIRATION RATE: 18 BRPM | TEMPERATURE: 98.4 F | BODY MASS INDEX: 29.77 KG/M2 | OXYGEN SATURATION: 97 % | DIASTOLIC BLOOD PRESSURE: 80 MMHG | HEIGHT: 63 IN | SYSTOLIC BLOOD PRESSURE: 128 MMHG | HEART RATE: 73 BPM

## 2020-01-06 DIAGNOSIS — S76.211A GROIN STRAIN, RIGHT, INITIAL ENCOUNTER: ICD-10-CM

## 2020-01-06 DIAGNOSIS — F41.9 ANXIETY: ICD-10-CM

## 2020-01-06 DIAGNOSIS — F03.A0 MILD DEMENTIA: Primary | ICD-10-CM

## 2020-01-06 DIAGNOSIS — F32.A DEPRESSION, UNSPECIFIED DEPRESSION TYPE: ICD-10-CM

## 2020-01-06 RX ORDER — CYCLOBENZAPRINE HCL 5 MG
5 TABLET ORAL
Qty: 30 TAB | Refills: 0 | Status: SHIPPED | OUTPATIENT
Start: 2020-01-06 | End: 2020-06-10 | Stop reason: SDUPTHER

## 2020-01-06 RX ORDER — DONEPEZIL HYDROCHLORIDE 5 MG/1
5 TABLET, FILM COATED ORAL
Qty: 90 TAB | Refills: 1 | Status: SHIPPED | OUTPATIENT
Start: 2020-01-06 | End: 2020-05-18

## 2020-01-06 NOTE — PATIENT INSTRUCTIONS
Psychiatry Offices: 65 [facility] at [address] [facility] 49, 45[address] [facility], [address] [place], [address]  Phone: [phone]    [facility]  [id number] [place] [address], [facility] [address]  Phone: [phone]    [address] [facility] Maddi Mckeon. [address], [address] [facility], [address] [place], [address]  Phone: [phone]    [facility]  Dr. Rosa Elena Thomas 65, [place], [address]  Phone: [phone]    Marin Conteh, Dr. Inna Ayala   [address] [facility]. [address], [place], [address]  Phone [phone] Psychiatric and [facility]  [address] [facility], [address] [place] [address], [address] [facility]  Phone: [phone]    Keara Chatman - Dr. Carolina Davila 35., 1000 [facility], [place], [address]  Phone: [phone]     Hang Gregg   [address] [place] [address] at [facility]   Veronica spannta, [address]  Phone [phone]           Learning About Dementia  What is dementia? We all forget things as we get older. Many older people have a slight loss of memory that does not affect their daily lives. But memory loss that gets worse may mean that you have dementia. Dementia is a loss of mental skills that affects your daily life. It can cause problems with memory, problem-solving, and learning. It also can cause problems with thinking and planning. Dementia usually gets worse over time. But how quickly it gets worse is different for each person. Some people stay the same for years. Others lose skills quickly. Your chances of having dementia rise as you get older. But this doesn't mean that everyone will get it. How is dementia diagnosed?   To diagnose dementia, your doctor will:  · Do a physical exam.  · Ask questions about recent and past illnesses and life events. The doctor will want to talk to a close family member to check details. · Ask you to do some simple things that test your memory and other mental skills. Your doctor may ask you to tell what day and year it is, repeat a series of words, or draw a clock face. The doctor may do tests to look for a cause that can be treated. For example, you might have blood tests to check your thyroid or to look for an infection. You might also have a test that shows a picture of your brain, like an MRI or a CT scan. These tests can help your doctor find a tumor or brain injury. Knowing the type of dementia a person has can help the doctor prescribe medicines or other treatments. What are the symptoms? Usually the first symptom of dementia is memory loss. Often the person who has the memory problem doesn't notice it, but family and friends do. People who have dementia may have increasing trouble with:  · Recalling recent events. They may forget appointments or lose objects. · Recognizing people and places. · Keeping up with conversations and activity. · Finding their way around familiar places, or driving to and from places they know well. · Keeping up personal care such as grooming or bathing. · Planning and carrying out routine tasks. They may have trouble following a recipe or writing a letter or email. How is dementia treated? Medicines for dementia can slow it down for a while and make it easier to live with. Medicines can't cure it. But they may help improve mental function, mood, or behavior. If a stroke caused the dementia, doing things to reduce the chance of another stroke may help. They include eating healthy foods, being active, staying at a healthy weight, and not smoking. As dementia gets worse, a person may get depressed or angry and upset. An active social life, counseling, and sometimes medicine may help with changing emotions.   The goals of ongoing treatment are to keep the person safely at home as long as possible and to provide support and guidance to the caregivers. The person will need routine follow-up visits. The doctor will monitor medicines and the person's level of functioning. Follow-up care is a key part of your treatment and safety. Be sure to make and go to all appointments, and call your doctor if you are having problems. It's also a good idea to know your test results and keep a list of the medicines you take. Where can you learn more? Go to http://celestina-dontae.info/. Enter [id number] [id number] in the search box to learn more about \"Learning About Dementia. \"  Current as of: May 28, 2019  Content Version: 12.2  © 3675-9097 [employer]. Care instructions adapted under license by [employer] (which disclaims liability or warranty for this information). If you have questions about a medical condition or this instruction, always ask your healthcare professional. [place] [address] any warranty or liability for your use of this information. Helping A Person With Dementia: Care Instructions  Your Care Instructions    Dementia is a loss of mental skills that affects daily life. It is different from mild memory loss that occurs with aging. Dementia can cause problems with memory, thinking clearly, and planning. It is different for everyone. But it usually gets worse slowly. Some people who have dementia can function well for a long time. But at some point it may become hard for the person to care for himself or herself. It can be upsetting to learn that a loved one has this condition. You may be afraid and worried about what will happen. You may wonder how you will care for the person. There is no cure for dementia. But medicine may be able to slow memory loss and improve thinking for a while. Other medicines may help with sleep, depression, and behavior changes.   Dementia is different for everyone. In some cases, people can function well for a long time. You can help your loved one by making his or her home life easier and safer. You also need to take care of yourself. Caregiving can be stressful. But support is available to help you and give you a break when you need it. The Alzheimer's Association offers good information and support. If you are caring for someone with dementia, you can help make life safer and more comfortable. You can also help your loved one make decisions about future care. You may also want to bring up legal and financial issues. These are hard but important conversations to have. Follow-up care is a key part of your loved one's treatment and safety. Be sure to make and go to all appointments, and call your doctor if your loved one is having problems. It's also a good idea to know your loved one's test results and keep a list of the medicines he or she takes. How can you care for your loved one at home? Taking care of the person  · If the person takes medicine for dementia, help him or her take it exactly as prescribed. Call the doctor if you notice any problems with the medicine. · Make a list of the person's medicines. Review it with all of his or her doctors. · Help the person eat a balanced diet. Serve plenty of whole grains, fruits, and vegetables every day. If the person is not hungry at mealtimes, give snacks at midmorning and in the afternoon. Offer drinks such as Boost, Ensure, or Sustacal if the person is losing weight. · Encourage exercise. Walking and other activities may slow the decline of mental ability. Help the person stay active mentally with reading, crossword puzzles, or other hobbies. · Talk openly with the doctor about any behavior changes. Many people who have dementia become easily upset or agitated or feel worried. There are many things that can cause this, such as medicine side effects, confusion, and pain.  It may be helpful to:  ? Keep distractions to a minimum. It may also help to keep noise levels low and voices quiet. ? Develop simple daily routines for bathing, dressing, and other activities. And remind your loved one often about upcoming changes to the daily routine, such as trips or appointments. ? Ask what is upsetting him or her. Keep in mind that people who have dementia don't always know why they are upset. · Take steps to help if the person is sundowning. This is the restless behavior and trouble with sleeping that may occur in late afternoon and at night. Try not to let the person nap during the day. Offer a glass of warm milk or caffeine-free tea before bedtime. · Be patient. A task may take the person longer than it used to. · For as long as he or she is able, allow your loved one to make decisions about activities, food, clothing, and other choices. Let him or her be independent, even if tasks take more time or are not done perfectly. Tailor tasks to the person's abilities. For example, if cooking is no longer safe, ask for other help. Your loved one can help set the table, or make simple dishes such as a salad. When the person needs help, offer it gently. Staying safe  · Make your home (or your loved one's home) safe. Tack down rugs, and put no-slip tape in the tub. Install handrails, and put safety switches on stoves and appliances. Keep rooms free of clutter. Make sure walkways around furniture are clear. Do not move furniture around, because the person may become confused. · Use locks on doors and cupboards. Lock up knives, scissors, medicines, cleaning supplies, and other dangerous things. · Do not let the person drive or cook if he or she can't do it safely. A person with dementia should not drive unless he or she is able to pass an on-road driving test. Your state 's license bureau can do a driving test if there is any question.   · Get medical alert jewelry for the person so that you can be contacted if he or she wanders away. If possible, provide a safe place for wandering, such as an enclosed yard or garden. Taking care of yourself  · Ask your doctor about support groups and other resources in your area. · Take care of your health. Be sure to eat healthy foods and get enough rest and exercise. · Take time for yourself. Respite services provide someone to stay with the person for a short time while you get out of the house for a few hours. · Make time for an activity that you enjoy. Read, listen to music, paint, do crafts, or play an instrument, even if it's only for a few minutes a day. · Spend time with family, friends, and others in your support system. When should you call for help? Call 911 anytime you think the person may need emergency care. For example, call if:    · The person who has dementia wanders away and you can't find him or her.     · The person who has dementia is seriously injured.    Call the doctor now or seek immediate medical care if:    · The person suddenly sees things that are not there (hallucinates).     · The person has a sudden change in his or her behavior.    Watch closely for changes in the person's health, and be sure to contact the doctor if:    · The person has symptoms that could cause injury.     · The person has problems with his or her medicine.     · You need more information to care for a person with dementia.     · You need respite care so you can take a break. Where can you learn more? Go to http://celestina-dontae.info/. Enter L102 in the search box to learn more about \"Helping A Person With Dementia: Care Instructions. \"  Current as of: May 28, 2019  Content Version: 12.2  © 7980-3054 [employer]. Care instructions adapted under license by [employer] (which disclaims liability or warranty for this information).  If you have questions about a medical condition or this instruction, always ask your healthcare professional. [employer] disclaims any warranty or liability for your use of this information.

## 2020-01-06 NOTE — PROGRESS NOTES
Identified pt with two pt identifiers(name and ). No chief complaint on file.        Health Maintenance Due   Topic    DTaP/Tdap/Td series (1 - Tdap)    BREAST CANCER SCRN MAMMOGRAM     Shingrix Vaccine Age 50> (2 of 2)    HEMOGLOBIN A1C Q6M        Wt Readings from Last 3 Encounters:   20 168 lb (76.2 kg)   12/10/19 172 lb (78 kg)   10/24/19 172 lb (78 kg)     Temp Readings from Last 3 Encounters:   20 98.4 °F (36.9 °C) (Oral)   12/10/19 98.7 °F (37.1 °C) (Oral)   10/24/19 98.4 °F (36.9 °C) (Oral)     BP Readings from Last 3 Encounters:   20 128/80   12/10/19 122/72   10/24/19 122/70     Pulse Readings from Last 3 Encounters:   20 73   12/10/19 78   10/24/19 62         Learning Assessment:  :     Learning Assessment 10/28/2014   PRIMARY LEARNER Patient   HIGHEST LEVEL OF EDUCATION - PRIMARY LEARNER  GRADUATED HIGH SCHOOL OR GED   BARRIERS PRIMARY LEARNER NONE   CO-LEARNER CAREGIVER No   PRIMARY LANGUAGE ENGLISH    NEED No   LEARNER PREFERENCE PRIMARY LISTENING   LEARNING SPECIAL TOPICS no   ANSWERED BY self   RELATIONSHIP SELF   ASSESSMENT COMMENT no       Depression Screening:  :     3 most recent PHQ Screens 2019   Little interest or pleasure in doing things Not at all   Feeling down, depressed, irritable, or hopeless Not at all   Total Score PHQ 2 0   Trouble falling or staying asleep, or sleeping too much -   Feeling tired or having little energy -   Poor appetite, weight loss, or overeating -   Feeling bad about yourself - or that you are a failure or have let yourself or your family down -   Trouble concentrating on things such as school, work, reading, or watching TV -   Moving or speaking so slowly that other people could have noticed; or the opposite being so fidgety that others notice -   Thoughts of being better off dead, or hurting yourself in some way -   PHQ 9 Score -   How difficult have these problems made it for you to do your work, take care of your home and get along with others -       Fall Risk Assessment:  :     Fall Risk Assessment, last 12 mths 10/24/2019   Able to walk? Yes   Fall in past 12 months? No   Fall with injury? -   Number of falls in past 12 months -   Fall Risk Score -       Abuse Screening:  :     Abuse Screening Questionnaire 10/24/2019 1/22/2019 4/9/2018 10/28/2014   Do you ever feel afraid of your partner? N N N N   Are you in a relationship with someone who physically or mentally threatens you? N N N N   Is it safe for you to go home? Y Y Y Y       Coordination of Care Questionnaire:  :     1) Have you been to an emergency room, urgent care clinic since your last visit? no   Hospitalized since your last visit? no             2) Have you seen or consulted any other health care providers outside of [address] [place] [address] since your last visit? no  (Include any pap smears or colon screenings in this section.)    3) Do you have an Advance Directive on file? no  Are you interested in receiving information about Advance Directives? no    Reviewed record in preparation for visit and have obtained necessary documentation. Medication reconciliation up to date and corrected with patient at this time.

## 2020-01-06 NOTE — PROGRESS NOTES
Subjective: Regina Live is a 66 y.o. female here to discuss results for neuropsychological evaluation with Dr. Sparkle Abrams 12/10/19. She is accompanied by her son Brandon Berrios with whom she lives. Evaluation notable for the following diagnoses:   · Dementia - Mild To Moderate  · Major Depression - Moderate  · Anxiety With Somatization - Moderate  · Passive Suicidal Ideation  · Rule out Psychosis  · Stress at Home (at least in part due to her dementia)    States that she has been complaint with her medications. Her son does help when he can. Has been seen by Psychiatry a few years ago, but did not have a good rapport with her provider. Does not go outside the home too much. Relationship with her son can be strained at times. He for the most part will be in his room and she in hers. Does not drive. Has been trying to keep herself active. Current Outpatient Medications   Medication Sig Dispense Refill    venlafaxine-SR (EFFEXOR-XR) 75 mg capsule Take 1 Cap by mouth daily. 90 Cap 1    venlafaxine-SR (EFFEXOR-XR) 150 mg capsule Take 1 Cap by mouth daily. 90 Cap 1    SITagliptin (JANUVIA) 25 mg tablet TAKE ONE TABLET BY MOUTH DAILY 90 Tab 1    omeprazole (PRILOSEC) 40 mg capsule Take 1 Cap by mouth daily. 90 Cap 1    levothyroxine (SYNTHROID) 75 mcg tablet Take 1 Tab by mouth Daily (before breakfast). 90 Tab 1    fenofibric acid (TRILIPIX ER) 135 mg capsule Take 1 Cap by mouth daily. 90 Cap 1    celecoxib (CELEBREX) 200 mg capsule Take 1 Cap by mouth daily. Indications: joint damage causing pain and loss of function 90 Cap 1    buPROPion XL (WELLBUTRIN XL) 150 mg tablet Take 1 Tab by mouth daily. 90 Tab 1    atorvastatin (LIPITOR) 40 mg tablet Take 1 Tab by mouth daily. 90 Tab 1    busPIRone (BUSPAR) 10 mg tablet Take 1 Tab by mouth two (2) times a day. 180 Tab 1    vitamin D3-vitamin K2, MK4, 1,000-100 unit-mcg tab Take 5,000 Int'l Units by mouth daily.          Allergies   Allergen Reactions    Gabapentin Drowsiness    Deserpidine Other (comments)     Puts pressure around her head like a ring       Past Medical History:   Diagnosis Date    Depression     Hypercholesterolemia     BERTRAM on CPAP     Dr. Daniel Valadez     Thyroid disease     Urinary incontinence        Social History     Tobacco Use    Smoking status: Never Smoker    Smokeless tobacco: Never Used   Substance Use Topics    Alcohol use: No        Review of Systems  Pertinent items are noted in HPI. Objective:     Visit Vitals  /80 (BP 1 Location: Right arm, BP Patient Position: Sitting) Comment: Manual   Pulse 73   Temp 98.4 °F (36.9 °C) (Oral) Comment: .   Resp 18   Ht 5' 3\" (1.6 m)   Wt 168 lb (76.2 kg)   SpO2 97%   BMI 29.76 kg/m²      General appearance - alert, well appearing, and in no distress  Chest - clear to auscultation, no wheezes, rales or rhonchi, symmetric air entry, no tachypnea, retractions or cyanosis  Heart - normal rate, regular rhythm, normal S1, S2, no murmurs, rubs, clicks or gallops  Mental Status - alert, oriented to person, place, and time, normal mood, behavior, speech, dress, motor activity, and thought processes. Assessment/Plan:   Sena Harrison is a 66 y.o. female seen for:     1. Mild dementia ([facility] [address]): will start Aricept as below and medication side effects discussed. - donepezil (ARICEPT) 5 mg tablet; Take 1 Tab by mouth nightly. 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 Encourage social interaction with her peers. - discussed establishing POA which son states that they have been trying to do for a few years; will work on.     2. Groin strain, right, initial encounter: has been seen by Orthopedics and does have a walker, which she is currently not using. Continue with Flexeril PRN. - cyclobenzaprine (FLEXERIL) 5 mg tablet;  Take 1 Tab by mouth three (3) times daily as needed for Muscle Spasm(s). Dispense: 30 Tab; Refill: 0    3. Depression, unspecified depression type: long discussion held with patient and her son Burgess Carpenter. Agree with Psychiatric evaluation and information to local offices provided. Encouraged to also contact her insurer about in-network providers. Will follow up with her. 4. Anxiety    I have discussed the diagnosis with the patient and the intended plan as seen in the above orders. The patient has received an after-visit summary and questions were answered concerning future plans. I have discussed medication side effects and warnings with the patient as well. Patient verbalizes understanding of plan of care and denies further questions or concerns at this time. Informed patient to return to the office if symptoms worsen or if new symptoms arise. Follow-up and Dispositions    · Return in about 6 weeks (around 2/17/2020).

## 2020-01-13 NOTE — TELEPHONE ENCOUNTER
Aricept confirmed by pharmacy. Missed original shipment. They will send out separately.  Should arrived Thursday or Friday

## 2020-01-13 NOTE — TELEPHONE ENCOUNTER
Pt following up on a medication she should have been given the Aricept why it was not ordered    Call pt at [phone]

## 2020-01-17 ENCOUNTER — TELEPHONE (OUTPATIENT)
Dept: FAMILY MEDICINE CLINIC | Age: 75
End: 2020-01-17

## 2020-01-20 ENCOUNTER — TELEPHONE (OUTPATIENT)
Dept: FAMILY MEDICINE CLINIC | Age: 75
End: 2020-01-20

## 2020-01-20 NOTE — TELEPHONE ENCOUNTER
Pt called stating \"Can you please have Dr Radha Zee give me a call?  I am crazy and I need for her to tell me where to go to a psychiatric hospital.\"    Best contact: [phone]

## 2020-03-04 ENCOUNTER — TELEPHONE (OUTPATIENT)
Dept: FAMILY MEDICINE CLINIC | Age: 75
End: 2020-03-04

## 2020-03-04 NOTE — TELEPHONE ENCOUNTER
The pt is asking to speak with the nurse or  about some stomach issues she has going on.    [phone]

## 2020-03-09 ENCOUNTER — OFFICE VISIT (OUTPATIENT)
Dept: FAMILY MEDICINE CLINIC | Age: 75
End: 2020-03-09

## 2020-03-09 VITALS
BODY MASS INDEX: 29.77 KG/M2 | HEART RATE: 70 BPM | WEIGHT: 168 LBS | OXYGEN SATURATION: 97 % | DIASTOLIC BLOOD PRESSURE: 60 MMHG | HEIGHT: 63 IN | SYSTOLIC BLOOD PRESSURE: 120 MMHG | TEMPERATURE: 98 F | RESPIRATION RATE: 18 BRPM

## 2020-03-09 DIAGNOSIS — F03.A0 MILD DEMENTIA: Primary | ICD-10-CM

## 2020-03-09 NOTE — PROGRESS NOTES
Identified pt with two pt identifiers(name and ). Chief Complaint   Patient presents with    Follow-up    Medication Evaluation     Patient is having trouble with Med rec. unable to remember when she takes medication.          Health Maintenance Due   Topic    DTaP/Tdap/Td series (1 - Tdap)    Shingrix Vaccine Age 49> (2 of 2)    GLAUCOMA SCREENING Q2Y        Wt Readings from Last 3 Encounters:   20 168 lb (76.2 kg)   20 168 lb (76.2 kg)   12/10/19 172 lb (78 kg)     Temp Readings from Last 3 Encounters:   20 98 °F (36.7 °C) (Oral)   20 98.4 °F (36.9 °C) (Oral)   12/10/19 98.7 °F (37.1 °C) (Oral)     BP Readings from Last 3 Encounters:   20 120/60   20 128/80   12/10/19 122/72     Pulse Readings from Last 3 Encounters:   20 70   20 73   12/10/19 78         Learning Assessment:  :     Learning Assessment 10/28/2014   PRIMARY LEARNER Patient   HIGHEST LEVEL OF EDUCATION - PRIMARY LEARNER  GRADUATED HIGH SCHOOL OR GED   BARRIERS PRIMARY LEARNER NONE   CO-LEARNER CAREGIVER No   PRIMARY LANGUAGE ENGLISH    NEED No   LEARNER PREFERENCE PRIMARY LISTENING   LEARNING SPECIAL TOPICS no   ANSWERED BY self   RELATIONSHIP SELF   ASSESSMENT COMMENT no       Depression Screening:  :     3 most recent PHQ Screens 2019   Little interest or pleasure in doing things Not at all   Feeling down, depressed, irritable, or hopeless Not at all   Total Score PHQ 2 0   Trouble falling or staying asleep, or sleeping too much -   Feeling tired or having little energy -   Poor appetite, weight loss, or overeating -   Feeling bad about yourself - or that you are a failure or have let yourself or your family down -   Trouble concentrating on things such as school, work, reading, or watching TV -   Moving or speaking so slowly that other people could have noticed; or the opposite being so fidgety that others notice -   Thoughts of being better off dead, or hurting yourself in some way -   PHQ 9 Score -   How difficult have these problems made it for you to do your work, take care of your home and get along with others -       Fall Risk Assessment:  :     Fall Risk Assessment, last 12 mths 10/24/2019   Able to walk? Yes   Fall in past 12 months? No   Fall with injury? -   Number of falls in past 12 months -   Fall Risk Score -       Abuse Screening:  :     Abuse Screening Questionnaire 10/24/2019 1/22/2019 4/9/2018 10/28/2014   Do you ever feel afraid of your partner? N N N N   Are you in a relationship with someone who physically or mentally threatens you? N N N N   Is it safe for you to go home? Y Y Y Y       Coordination of Care Questionnaire:  :     1) Have you been to an emergency room, urgent care clinic since your last visit? no   Hospitalized since your last visit? no             2) Have you seen or consulted any other health care providers outside of [address] [place] [address] since your last visit? no  (Include any pap smears or colon screenings in this section.)    3) Do you have an Advance Directive on file? no  Are you interested in receiving information about Advance Directives? no    Patient is accompanied by son I have received verbal consent from Colletta Nurse to discuss any/all medical information while they are present in the room. Reviewed record in preparation for visit and have obtained necessary documentation. Medication reconciliation up to date and corrected with patient at this time.

## 2020-03-09 NOTE — PROGRESS NOTES
Subjective: Dany Burns is a 66 y.o. female here today with her son Carey Landaverde for follow up. Patient reports that she has been doing well. She is tolerating donepezil well without adverse side effect. Son reports that she still has memory loss, but he has not noticed any worsening. She lives at home with her son, does have a daughter who comes by at least weekly. Patient does stay at home, does not engage in any social activities. Stays active at home by cleaning. She has been filling her own pill box. Uses pharmacy which packages her medications for her, but current medications are in bottles. Son states that she has called pharmacy stating that she is out of medication. They have found bottles of unused medication and did find 2 months of medication packing. She has been resistant to the help of her son with medication preferring to do so herself. Son and daughter have been going through medications at home and trying to discard old prescriptions. Has not contacted psychiatry as previously discussed; has information at home. Current Outpatient Medications   Medication Sig Dispense Refill    donepezil (ARICEPT) 5 mg tablet Take 1 Tab by mouth nightly. 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 30 Tab 0    venlafaxine-SR (EFFEXOR-XR) 75 mg capsule Take 1 Cap by mouth daily. 90 Cap 1    venlafaxine-SR (EFFEXOR-XR) 150 mg capsule Take 1 Cap by mouth daily. 90 Cap 1    SITagliptin (JANUVIA) 25 mg tablet TAKE ONE TABLET BY MOUTH DAILY 90 Tab 1    omeprazole (PRILOSEC) 40 mg capsule Take 1 Cap by mouth daily. 90 Cap 1    levothyroxine (SYNTHROID) 75 mcg tablet Take 1 Tab by mouth Daily (before breakfast). 90 Tab 1    fenofibric acid (TRILIPIX ER) 135 mg capsule Take 1 Cap by mouth daily.  90 Cap 1    celecoxib (CELEBREX) 200 mg capsule Take 1 Cap by mouth daily. Indications: joint damage causing pain and loss of function 90 Cap 1    buPROPion XL (WELLBUTRIN XL) 150 mg tablet Take 1 Tab by mouth daily. 90 Tab 1    atorvastatin (LIPITOR) 40 mg tablet Take 1 Tab by mouth daily. 90 Tab 1    busPIRone (BUSPAR) 10 mg tablet Take 1 Tab by mouth two (2) times a day. (Patient taking differently: Take 10 mg by mouth daily. ) 180 Tab 1    vitamin D3-vitamin K2, MK4, 1,000-100 unit-mcg tab Take 5,000 Int'l Units by mouth daily. Allergies   Allergen Reactions    Gabapentin Drowsiness    Deserpidine Other (comments)     Puts pressure around her head like a ring       Past Medical History:   Diagnosis Date    Depression     Hypercholesterolemia     BERTRAM on CPAP     Dr. Shelby Cruz     Thyroid disease     Urinary incontinence        Social History     Tobacco Use    Smoking status: Never Smoker    Smokeless tobacco: Never Used   Substance Use Topics    Alcohol use: No        Review of Systems  Pertinent items are noted in HPI. Objective:     Visit Vitals  /60 (BP 1 Location: Right arm, BP Patient Position: Sitting) Comment: [place]   Pulse 70   Temp 98 °F (36.7 °C) (Oral) Comment: .   Resp 18   Ht 5' 3\" (1.6 m)   Wt 168 lb (76.2 kg)   SpO2 97%   BMI 29.76 kg/m²      General appearance - alert, well appearing, and in no distress  Chest - clear to auscultation, no wheezes, rales or rhonchi, symmetric air entry, no tachypnea, retractions or cyanosis  Heart - normal rate, regular rhythm, normal S1, S2, no murmurs, rubs, clicks or gallops    Assessment/Plan:   Graciela Royal is a 66 y.o. female seen for:     1. Mild dementia ([facility] [address]  - very pleasant woman. Has a strained relationship with her son and I do feel that this is likely due to her personality as she is strong-willed and independent. Does require assistance and discussed that she should allow her children to help with medications, household work and that this is okay.  Concern that she is filling her own pill box and have asked that her son check to ensure that medications are placed correctly and taken. Will contact her pharmacy to see if they can restart sending her medicines in packaged form as this will be easier for her. Again discussed advance care planning. Encouraged to contact psychiatry as previously discussed. I have discussed the diagnosis with the patient and the intended plan as seen in the above orders. The patient has received an after-visit summary and questions were answered concerning future plans. I have discussed medication side effects and warnings with the patient as well. Patient verbalizes understanding of plan of care and denies further questions or concerns at this time. Informed patient to return to the office if symptoms worsen or if new symptoms arise.

## 2020-03-11 RX ORDER — VENLAFAXINE HYDROCHLORIDE 75 MG/1
CAPSULE, EXTENDED RELEASE ORAL
Qty: 90 CAP | Refills: 0 | OUTPATIENT
Start: 2020-03-11

## 2020-03-11 RX ORDER — BUSPIRONE HYDROCHLORIDE 10 MG/1
10 TABLET ORAL 2 TIMES DAILY
Refills: 0 | OUTPATIENT
Start: 2020-03-11

## 2020-03-11 RX ORDER — SITAGLIPTIN 25 MG/1
TABLET, FILM COATED ORAL
Refills: 0 | OUTPATIENT
Start: 2020-03-11

## 2020-03-11 NOTE — TELEPHONE ENCOUNTER
Called and spoke with [facility]. Medications have been shipped in individual packets with date and times of administration and current pack to start 3/14/2020. Pt informed and will start as directed on package.

## 2020-04-16 DIAGNOSIS — F32.A DEPRESSION, UNSPECIFIED DEPRESSION TYPE: ICD-10-CM

## 2020-04-16 DIAGNOSIS — E11.9 TYPE 2 DIABETES MELLITUS WITHOUT COMPLICATION, WITHOUT LONG-TERM CURRENT USE OF INSULIN (HCC): ICD-10-CM

## 2020-04-17 RX ORDER — VENLAFAXINE HYDROCHLORIDE 75 MG/1
CAPSULE, EXTENDED RELEASE ORAL
Qty: 90 CAP | Refills: 1 | Status: SHIPPED | OUTPATIENT
Start: 2020-04-17 | End: 2020-10-12

## 2020-05-14 LAB — HBA1C MFR BLD HPLC: 6.9 %

## 2020-05-15 LAB
CREATININE, EXTERNAL: 1.1
LDL-C, EXTERNAL: 81

## 2020-05-16 DIAGNOSIS — F41.9 ANXIETY: ICD-10-CM

## 2020-05-16 DIAGNOSIS — F03.A0 MILD DEMENTIA: ICD-10-CM

## 2020-05-18 RX ORDER — DONEPEZIL HYDROCHLORIDE 5 MG/1
5 TABLET, FILM COATED ORAL
Qty: 90 TAB | Refills: 1 | Status: SHIPPED | OUTPATIENT
Start: 2020-05-18 | End: 2020-12-18

## 2020-05-18 RX ORDER — BUSPIRONE HYDROCHLORIDE 10 MG/1
10 TABLET ORAL 2 TIMES DAILY
Qty: 180 TAB | Refills: 1 | Status: SHIPPED | OUTPATIENT
Start: 2020-05-18 | End: 2021-01-28 | Stop reason: SDUPTHER

## 2020-05-27 ENCOUNTER — DOCUMENTATION ONLY (OUTPATIENT)
Dept: FAMILY MEDICINE CLINIC | Age: 75
End: 2020-05-27

## 2020-05-28 ENCOUNTER — VIRTUAL VISIT (OUTPATIENT)
Dept: FAMILY MEDICINE CLINIC | Age: 75
End: 2020-05-28

## 2020-05-28 VITALS — HEIGHT: 63 IN | BODY MASS INDEX: 29.76 KG/M2

## 2020-05-28 DIAGNOSIS — E53.8 VITAMIN B12 DEFICIENCY: Primary | ICD-10-CM

## 2020-05-28 DIAGNOSIS — R15.9 INCONTINENCE OF FECES, UNSPECIFIED FECAL INCONTINENCE TYPE: ICD-10-CM

## 2020-05-28 DIAGNOSIS — Z09 HOSPITAL DISCHARGE FOLLOW-UP: ICD-10-CM

## 2020-05-28 DIAGNOSIS — E78.2 MIXED HYPERLIPIDEMIA: ICD-10-CM

## 2020-05-28 DIAGNOSIS — E53.8 VITAMIN B12 DEFICIENCY: ICD-10-CM

## 2020-05-28 RX ORDER — ATORVASTATIN CALCIUM 80 MG/1
80 TABLET, FILM COATED ORAL DAILY
Qty: 90 TAB | Refills: 1 | Status: SHIPPED | OUTPATIENT
Start: 2020-05-28 | End: 2021-01-28 | Stop reason: SDUPTHER

## 2020-05-28 RX ORDER — ATORVASTATIN CALCIUM 80 MG/1
80 TABLET, FILM COATED ORAL
COMMUNITY
Start: 2020-05-16 | End: 2020-05-28 | Stop reason: SDUPTHER

## 2020-05-28 RX ORDER — LANOLIN ALCOHOL/MO/W.PET/CERES
1000 CREAM (GRAM) TOPICAL DAILY
Qty: 30 TAB | Refills: 1 | Status: SHIPPED | OUTPATIENT
Start: 2020-05-28 | End: 2020-12-18

## 2020-05-28 NOTE — PROGRESS NOTES
Identified pt with two pt identifiers(name and ).     Chief Complaint   Patient presents with   [facility] Follow Up     was hospitalized in [facility] from  - 05/15 for dizziness, weakness and fatigue        Health Maintenance Due   Topic    DTaP/Tdap/Td series (1 - Tdap)    Shingrix Vaccine Age 50> (2 of 2)    A1C test (Diabetic or Prediabetic)     GLAUCOMA SCREENING Q2Y     Eye Exam Retinal or Dilated     MICROALBUMIN Q1     Foot Exam Q1     Lipid Screen        Wt Readings from Last 3 Encounters:   20 168 lb (76.2 kg)   20 168 lb (76.2 kg)   12/10/19 172 lb (78 kg)     Temp Readings from Last 3 Encounters:   20 98 °F (36.7 °C) (Oral)   20 98.4 °F (36.9 °C) (Oral)   12/10/19 98.7 °F (37.1 °C) (Oral)     BP Readings from Last 3 Encounters:   20 120/60   20 128/80   12/10/19 122/72     Pulse Readings from Last 3 Encounters:   20 70   20 73   12/10/19 78         Learning Assessment:  :     Learning Assessment 10/28/2014   PRIMARY LEARNER Patient   HIGHEST LEVEL OF EDUCATION - PRIMARY LEARNER  GRADUATED HIGH SCHOOL OR GED   BARRIERS PRIMARY LEARNER NONE   CO-LEARNER CAREGIVER No   PRIMARY LANGUAGE ENGLISH    NEED No   LEARNER PREFERENCE PRIMARY LISTENING   LEARNING SPECIAL TOPICS no   ANSWERED BY self   RELATIONSHIP SELF   ASSESSMENT COMMENT no       Depression Screening:  :     3 most recent PHQ Screens 2020   Little interest or pleasure in doing things Not at all   Feeling down, depressed, irritable, or hopeless Not at all   Total Score PHQ 2 0   Trouble falling or staying asleep, or sleeping too much -   Feeling tired or having little energy -   Poor appetite, weight loss, or overeating -   Feeling bad about yourself - or that you are a failure or have let yourself or your family down -   Trouble concentrating on things such as school, work, reading, or watching TV -   Moving or speaking so slowly that other people could have noticed; or the opposite being so fidgety that others notice -   Thoughts of being better off dead, or hurting yourself in some way -   PHQ 9 Score -   How difficult have these problems made it for you to do your work, take care of your home and get along with others -       Fall Risk Assessment:  :     Fall Risk Assessment, last 12 mths 10/24/2019   Able to walk? Yes   Fall in past 12 months? No   Fall with injury? -   Number of falls in past 12 months -   Fall Risk Score -       Abuse Screening:  :     Abuse Screening Questionnaire 10/24/2019 1/22/2019 4/9/2018 10/28/2014   Do you ever feel afraid of your partner? N N N N   Are you in a relationship with someone who physically or mentally threatens you? N N N N   Is it safe for you to go home? Uma Loyd         Coordination of Care Questionnaire:  :     1) Have you been to an emergency room, urgent care clinic since your last visit? Yes   Hospitalized since your last visit? Yes, [facility]    2) Have you seen or consulted any other health care providers outside of [address] [place] [address] since your last visit? no  (Include any pap smears or colon screenings in this section.)    3) Do you have an Advance Directive on file? yes  Are you interested in receiving information about Advance Directives? no    Patient is accompanied by son. Reviewed record in preparation for visit and have obtained necessary documentation. Medication reconciliation up to date and corrected with patient at this time.

## 2020-05-28 NOTE — PROGRESS NOTES
Radha Bianchi is a 66 y.o. female who was seen by synchronous (real-time) audio-video technology on 5/28/2020. Consent: Radha Bianchi, who was seen by synchronous (real-time) audio-video technology, and/or her healthcare decision maker, is aware that this patient-initiated, Telehealth encounter on 5/28/2020 is a billable service, with coverage as determined by her insurance carrier. She is aware that she may receive a bill and has provided verbal consent to proceed: Yes. Assessment & Plan:   Diagnoses and all orders for this visit:    1. Vitamin B12 deficiency: will further evaluate as below. Discussed starting oral vitamin B12 1000 mcg daily. Plan to repeat vitamin B12 levels in 6 weeks. -     METHYLMALONIC ACID; Future  -     INTRINSIC FACTOR AB; Future  -     cyanocobalamin (Vitamin B-12) 1,000 mcg tablet; Take 1 Tab by mouth daily. 2. Mixed hyperlipidemia: atorvastatin dose increased, new Rx sent to [facility]. -     atorvastatin (LIPITOR) 80 mg tablet; Take 1 Tab by mouth daily. 3. Incontinence of feces, unspecified fecal incontinence type: unaware of need to defecate. Refer for further evaluation.   -     REFERRAL TO COLON AND RECTAL SURGERY    [id number] [facility] discharge follow-up    712  Subjective: Radha Bianchi is a 66 y.o. female who was seen for Hospital Follow Up (was hospitalized in [facility] from 05/14 - 05/15 for dizziness, weakness and fatigue)    Admitted to [facility] 5/14/2020 - 5/15/2020 after presenting with slurred speech and cognitive difficulty. Discharge summary received and reviewed. CTA head and neck no evidence of large vessel occlusion. Evaluated by Neurology and examination thought to be not consistent with CVA. MRI brain with no evidence of CVA. Symptoms resolved. Vitamin B12 noted to be low for which she was started on vitamin B12. Atorvastatin increased to 80 mg. Discharged home and has [facility].  Advised to follow up with Neurology which she has not yet scheduled. States that she has been doing well. No slurred speech and cognition is back to baseline. Has not started vitamin B12 -- unaware prescription was at her pharmacy. Additional concerns:   - Having episodes of fecal incontinence. States that she is unaware that she has to defecate. Started about 2 years ago. Denies associated back pain, LE weakness, saddle anesthesia. Has not been seen by GI in some time. Prior to Admission medications    Medication Sig Start Date End Date Taking? Authorizing Provider   atorvastatin (LIPITOR) 80 mg tablet Take 80 mg by mouth once over twenty-four (24) hours. 5/16/20  Yes Provider, Historical   busPIRone (BUSPAR) 10 mg tablet Take 1 Tab by mouth two (2) times a day. 5/18/20  Yes Jeff Garibay MD   donepeziL (ARICEPT) 5 mg tablet Take 1 Tab by mouth nightly. 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 4/17/20  Yes Jeff Garibay MD   SITagliptin (Januvia) 25 mg tablet TAKE ONE TABLET BY MOUTH DAILY 4/17/20  Yes Jeff Garibay MD   cyclobenzaprine (FLEXERIL) 5 mg tablet Take 1 Tab by mouth three (3) times daily as needed for Muscle Spasm(s). 1/6/20  Yes Jeff Garibay MD   venlafaxine-SR [facility] 150 mg capsule Take 1 Cap by mouth daily. 12/10/19  Yes Jeff Garibay MD   levothyroxine (SYNTHROID) 75 mcg tablet Take 1 Tab by mouth Daily (before breakfast). 12/10/19  Yes Jeff Garibay MD   fenofibric acid LITTLE [facility]) 135 mg capsule Take 1 Cap by mouth daily. 12/10/19  Yes Jeff Garibay MD   celecoxib (CELEBREX) 200 mg capsule Take 1 Cap by mouth daily. Indications: joint damage causing pain and loss of function 12/10/19  Yes Jeff Garibay MD   buPROPion XL (WELLBUTRIN XL) 150 mg tablet Take 1 Tab by mouth daily.  12/10/19  Yes Jeff Garibay MD   vitamin D3-vitamin K2, MK4, 1,000-100 unit-mcg tab Take 5,000 Int'l Units by mouth daily. 7/25/16  Yes Janett Gabriel NP   omeprazole (PRILOSEC) 40 mg capsule Take 1 Cap by mouth daily. 12/10/19   Tramaine Sommers MD   atorvastatin (LIPITOR) 40 mg tablet Take 1 Tab by mouth daily. 12/10/19 5/28/20  Tramaine Sommers MD       Allergies   Allergen Reactions    Gabapentin Drowsiness    Deserpidine Other (comments)     Puts pressure around her head like a ring       Past Medical History:   Diagnosis Date    Depression     Hypercholesterolemia     BERTRAM on CPAP     Dr. Sravani Haddad     Thyroid disease     Urinary incontinence      Social History     Tobacco Use    Smoking status: Never Smoker    Smokeless tobacco: Never Used   Substance Use Topics    Alcohol use: No       ROS  Pertinent items noted in HPI     Objective:     Visit Vitals  Ht 5' 3\" (1.6 m)   BMI 29.76 kg/m²      General: alert, cooperative, no distress   Mental  status: normal mood, behavior, speech, dress, motor activity, and thought processes, able to follow commands   HENT: NCAT   Neck: no visualized mass   Resp: no respiratory distress   Neuro: no gross deficits   Skin: no discoloration or lesions of concern on visible areas   Psychiatric: normal affect, consistent with stated mood, no evidence of hallucinations       We discussed the expected course, resolution and complications of the diagnosis(es) in detail. Medication risks, benefits, costs, interactions, and alternatives were discussed as indicated. I advised her to contact the office if her condition worsens, changes or fails to improve as anticipated. She expressed understanding with the diagnosis(es) and plan. Aminata Chavez is a 66 y.o. female who was evaluated by a video visit encounter for concerns as above. Patient identification was verified prior to start of the visit. A caregiver was present when appropriate.  Due to this being a TeleHealth encounter (During COVID-19 public health emergency), evaluation of the following organ systems was limited: Vitals/Constitutional/EENT/Resp/CV/GI//MS/Neuro/Skin/Heme-Lymph-Imm. Pursuant to the emergency declaration under the [facility], [facility] waiver authority and the [employer] and [employer] Act, this Virtual  Visit was conducted, with patient's (and/or legal guardian's) consent, to reduce the patient's risk of exposure to COVID-19 and provide necessary medical care. Services were provided through a video synchronous discussion virtually to substitute for in-person clinic visit. Patient and provider were located at their individual homes.       Jazmin Oneal MD

## 2020-05-29 ENCOUNTER — TELEPHONE (OUTPATIENT)
Dept: FAMILY MEDICINE CLINIC | Age: 75
End: 2020-05-29

## 2020-05-29 NOTE — TELEPHONE ENCOUNTER
----- Message from Alexander De Leon sent at 2020  9:42 AM EDT -----  Regarding: Dr. Jack Sue Message/ Vendor Call    : [birth year]    Caller: Lonnie mark; clinician  [facility]    Phone: [phone]    Reason for call: low temp last 2 day 95 on 20 and 92 on 20; stated that pt is cool to touch but sweaty and clammy too    Details: n/a

## 2020-06-02 ENCOUNTER — TELEPHONE (OUTPATIENT)
Dept: FAMILY MEDICINE CLINIC | Age: 75
End: 2020-06-02

## 2020-06-02 NOTE — TELEPHONE ENCOUNTER
Patient is calling and stated that she has been sick and has not been able to get the labs done that Dr. Leon wanted her to do. She just wanted to let you know.  [phone]

## 2020-06-02 NOTE — TELEPHONE ENCOUNTER
Pt over the weekend had blood sugar drop to 50 and went to hospital and today 6/2/20 59 blood sugar level and up a lot last night with loose bowels     Call carla with [facility] [phone]

## 2020-06-03 NOTE — TELEPHONE ENCOUNTER
Call placed to [facility] with [facility] with no answer. [facility] message asking that she return my call at her earliest convenience.

## 2020-06-05 NOTE — TELEPHONE ENCOUNTER
Pt stated a stress test to be done at [facility] in [place] and can't get appt rescheduled.  Pt will try to call them again and try to keep trying to rs appt

## 2020-06-10 DIAGNOSIS — K21.9 GASTROESOPHAGEAL REFLUX DISEASE WITHOUT ESOPHAGITIS: ICD-10-CM

## 2020-06-10 DIAGNOSIS — E11.9 TYPE 2 DIABETES MELLITUS WITHOUT COMPLICATION, WITHOUT LONG-TERM CURRENT USE OF INSULIN (HCC): ICD-10-CM

## 2020-06-10 DIAGNOSIS — F32.A DEPRESSION, UNSPECIFIED DEPRESSION TYPE: ICD-10-CM

## 2020-06-10 DIAGNOSIS — F41.9 ANXIETY: ICD-10-CM

## 2020-06-10 DIAGNOSIS — F03.A0 MILD DEMENTIA: ICD-10-CM

## 2020-06-10 DIAGNOSIS — E78.2 MIXED HYPERLIPIDEMIA: ICD-10-CM

## 2020-06-10 DIAGNOSIS — S76.211A GROIN STRAIN, RIGHT, INITIAL ENCOUNTER: ICD-10-CM

## 2020-06-10 RX ORDER — CYCLOBENZAPRINE HCL 5 MG
5 TABLET ORAL
Qty: 30 TAB | Refills: 0 | Status: SHIPPED | OUTPATIENT
Start: 2020-06-10 | End: 2020-12-18

## 2020-06-10 RX ORDER — VENLAFAXINE HYDROCHLORIDE 150 MG/1
150 CAPSULE, EXTENDED RELEASE ORAL DAILY
Qty: 90 CAP | Refills: 1 | Status: SHIPPED | OUTPATIENT
Start: 2020-06-10 | End: 2021-01-06

## 2020-06-10 RX ORDER — BUSPIRONE HYDROCHLORIDE 10 MG/1
10 TABLET ORAL 2 TIMES DAILY
Qty: 180 TAB | Refills: 1 | OUTPATIENT
Start: 2020-06-10

## 2020-06-10 RX ORDER — OMEPRAZOLE 40 MG/1
40 CAPSULE, DELAYED RELEASE ORAL DAILY
Qty: 90 CAP | Refills: 1 | Status: SHIPPED | OUTPATIENT
Start: 2020-06-10 | End: 2021-01-28 | Stop reason: SDUPTHER

## 2020-06-10 RX ORDER — FENOFIBRIC ACID 135 MG/1
135 CAPSULE, DELAYED RELEASE ORAL DAILY
Qty: 90 CAP | Refills: 1 | Status: SHIPPED | OUTPATIENT
Start: 2020-06-10 | End: 2021-01-06

## 2020-06-10 RX ORDER — BUPROPION HYDROCHLORIDE 150 MG/1
TABLET ORAL
Qty: 90 TAB | Refills: 1 | Status: SHIPPED | OUTPATIENT
Start: 2020-06-10 | End: 2021-01-06

## 2020-06-10 RX ORDER — ATORVASTATIN CALCIUM 80 MG/1
80 TABLET, FILM COATED ORAL DAILY
Qty: 90 TAB | Refills: 1 | OUTPATIENT
Start: 2020-06-10

## 2020-06-10 RX ORDER — DONEPEZIL HYDROCHLORIDE 5 MG/1
5 TABLET, FILM COATED ORAL
Qty: 90 TAB | Refills: 1 | OUTPATIENT
Start: 2020-06-10

## 2020-06-11 ENCOUNTER — TELEPHONE (OUTPATIENT)
Dept: FAMILY MEDICINE CLINIC | Age: 75
End: 2020-06-11

## 2020-06-11 NOTE — TELEPHONE ENCOUNTER
Pharmacy calling as medication is cyclobenzaprine and pt has allergies listed and wants to know if pt should still take this medication     Call [facility] [phone] option 3

## 2020-06-12 LAB
IF BLOCK AB SER-ACNC: 1.1 AU/ML (ref 0–1.1)
Lab: NORMAL
METHYLMALONATE SERPL-SCNC: 169 NMOL/L (ref 0–378)

## 2020-06-24 ENCOUNTER — VIRTUAL VISIT (OUTPATIENT)
Dept: FAMILY MEDICINE CLINIC | Age: 75
End: 2020-06-24

## 2020-06-24 DIAGNOSIS — E03.9 HYPOTHYROIDISM, UNSPECIFIED TYPE: ICD-10-CM

## 2020-06-24 DIAGNOSIS — E53.8 VITAMIN B12 DEFICIENCY: Primary | ICD-10-CM

## 2020-06-24 RX ORDER — LEVOTHYROXINE SODIUM 75 UG/1
75 TABLET ORAL
Qty: 14 TAB | Refills: 0 | Status: SHIPPED | OUTPATIENT
Start: 2020-06-24 | End: 2020-07-08

## 2020-06-24 NOTE — PROGRESS NOTES
Scott Sánchez is a 66 y.o. female who was seen by synchronous (real-time) audio-video technology on 6/24/2020. Consent: Scott Sánchez, who was seen by synchronous (real-time) audio-video technology, and/or her healthcare decision maker, is aware that this patient-initiated, Telehealth encounter on 6/24/2020 is a billable service, with coverage as determined by her insurance carrier. She is aware that she may receive a bill and has provided verbal consent to proceed: Yes. Assessment & Plan:   Diagnoses and all orders for this visit:    1. Vitamin B12 deficiency: recommend that she taking B12 supplement daily as discussed with plan to recheck B12 level after at least 6 weeks of therapy. 2. Hypothyroidism, unspecified type: sent 2 week supply of levothyroxine to local pharmacy while awaiting shipment from [facility]. -     levothyroxine (SYNTHROID) 75 mcg tablet; Take 1 Tab by mouth Daily (before breakfast) for 14 days. 712  Subjective: Scott Sánchez is a 66 y.o. female who was seen for Follow-up (b-12)    Has not been taking vitamin B12 supplement daily as recommended. Recent workup with normal methylmalonic acid and intrinsic factor antibody. Her son reports that they have but rails on her bed. She has not been seen by Neurology and recommended that appointment be scheduled. Has appointment with Dr. Underwood Check 7/6/20. Has not been taking levothyroxine as this is not currently in her medications from [facility]. Call pharmacy and they will be placed in packets at her next medication fill in July. Prior to Admission medications    Medication Sig Start Date End Date Taking? Authorizing Provider   omeprazole (PRILOSEC) 40 mg capsule Take 1 Cap by mouth daily. 6/10/20  Yes Caroline Barraza MD   fenofibric acid LITTLE [facility]) 135 mg capsule Take 1 Cap by mouth daily. 6/10/20  Yes Caroline Barraza MD   buPROPion XL (WELLBUTRIN XL) 150 mg tablet Take 1 Tab by mouth daily.  6/10/20 Yes Jennifer Sarah MD   SITagliptin (Januvia) 25 mg tablet TAKE ONE TABLET BY MOUTH DAILY 6/10/20  Yes Jennifer Sarah MD   venlafaxine-SR [facility] 150 mg capsule Take 1 Cap by mouth daily. 6/10/20  Yes Jennifer Sarah MD   atorvastatin (LIPITOR) 80 mg tablet Take 1 Tab by mouth daily. 5/28/20  Yes Jennifer Sarah MD   busPIRone (BUSPAR) 10 mg tablet Take 1 Tab by mouth two (2) times a day. 5/18/20  Yes Jennifer Sarah MD   donepeziL (ARICEPT) 5 mg tablet Take 1 Tab by mouth nightly. 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 4/17/20  Yes Jennifer Sarah MD   cyclobenzaprine (FLEXERIL) 5 mg tablet Take 1 Tab by mouth three (3) times daily as needed for Muscle Spasm(s). 6/10/20   Jennifer Sarah MD   cyanocobalamin (Vitamin B-12) 1,000 mcg tablet Take 1 Tab by mouth daily. 5/28/20   Jennifer Sarah MD   levothyroxine (SYNTHROID) 75 mcg tablet Take 1 Tab by mouth Daily (before breakfast). 12/10/19   Jennifer Sarah MD   celecoxib (CELEBREX) 200 mg capsule Take 1 Cap by mouth daily. Indications: joint damage causing pain and loss of function 12/10/19   Jennifer Sarah MD   vitamin D3-vitamin K2, MK4, 1,000-100 unit-mcg tab Take 5,000 Int'l Units by mouth daily. 7/25/16 6/24/20  Jennifer Rankin NP       Allergies   Allergen Reactions    Gabapentin Drowsiness    Deserpidine Other (comments)     Puts pressure around her head like a ring       Past Medical History:   Diagnosis Date    Depression     Hypercholesterolemia     BERTRAM on CPAP     Dr. Owens Sees     Thyroid disease     Urinary incontinence      Social History     Tobacco Use    Smoking status: Never Smoker    Smokeless tobacco: Never Used   Substance Use Topics    Alcohol use: No       ROS  Pertinent items noted in HPI    Objective: There were no vitals taken for this visit.      General: alert, cooperative, no distress   Mental  status: normal mood, behavior, speech, dress, motor activity, and thought processes, able to follow commands   HENT: NCAT   Neck: no visualized mass   Resp: no respiratory distress   Neuro: no gross deficits   Skin: no discoloration or lesions of concern on visible areas   Psychiatric: normal affect, consistent with stated mood, no evidence of hallucinations       We discussed the expected course, resolution and complications of the diagnosis(es) in detail. Medication risks, benefits, costs, interactions, and alternatives were discussed as indicated. I advised her to contact the office if her condition worsens, changes or fails to improve as anticipated. She expressed understanding with the diagnosis(es) and plan. Patricio Simons is a 66 y.o. female who was evaluated by a video visit encounter for concerns as above. Patient identification was verified prior to start of the visit. A caregiver was present when appropriate. Due to this being a TeleHealth encounter (During AACNR-00 public health emergency), evaluation of the following organ systems was limited: Vitals/Constitutional/EENT/Resp/CV/GI//MS/Neuro/Skin/Heme-Lymph-Imm. Pursuant to the emergency declaration under the [facility], 1135 waiver authority and the [employer] Act, this Virtual  Visit was conducted, with patient's (and/or legal guardian's) consent, to reduce the patient's risk of exposure to COVID-19 and provide necessary medical care. Services were provided through a video synchronous discussion virtually to substitute for in-person clinic visit. Patient and provider were located at their individual homes.       Mohan Del Toro MD

## 2020-06-24 NOTE — PROGRESS NOTES
Identified pt with two pt identifiers(name and ).     Chief Complaint   Patient presents with    Follow-up     b-12        Health Maintenance Due   Topic    DTaP/Tdap/Td series (1 - Tdap)    Shingrix Vaccine Age 49> (2 of 2)    GLAUCOMA SCREENING Q2Y     Eye Exam Retinal or Dilated     MICROALBUMIN Q1     Foot Exam Q1        Wt Readings from Last 3 Encounters:   20 168 lb (76.2 kg)   20 168 lb (76.2 kg)   12/10/19 172 lb (78 kg)     Temp Readings from Last 3 Encounters:   20 98 °F (36.7 °C) (Oral)   20 98.4 °F (36.9 °C) (Oral)   12/10/19 98.7 °F (37.1 °C) (Oral)     BP Readings from Last 3 Encounters:   20 120/60   20 128/80   12/10/19 122/72     Pulse Readings from Last 3 Encounters:   20 70   20 73   12/10/19 78         Learning Assessment:  :     Learning Assessment 10/28/2014   PRIMARY LEARNER Patient   HIGHEST LEVEL OF EDUCATION - PRIMARY LEARNER  GRADUATED HIGH SCHOOL OR GED   BARRIERS PRIMARY LEARNER NONE   CO-LEARNER CAREGIVER No   PRIMARY LANGUAGE ENGLISH    NEED No   LEARNER PREFERENCE PRIMARY LISTENING   LEARNING SPECIAL TOPICS no   ANSWERED BY self   RELATIONSHIP SELF   ASSESSMENT COMMENT no       Depression Screening:  :     3 most recent PHQ Screens 2020   Little interest or pleasure in doing things Not at all   Feeling down, depressed, irritable, or hopeless Not at all   Total Score PHQ 2 0   Trouble falling or staying asleep, or sleeping too much -   Feeling tired or having little energy -   Poor appetite, weight loss, or overeating -   Feeling bad about yourself - or that you are a failure or have let yourself or your family down -   Trouble concentrating on things such as school, work, reading, or watching TV -   Moving or speaking so slowly that other people could have noticed; or the opposite being so fidgety that others notice -   Thoughts of being better off dead, or hurting yourself in some way -   PHQ 9 Score -   How difficult have these problems made it for you to do your work, take care of your home and get along with others -       Fall Risk Assessment:  :     Fall Risk Assessment, last 12 mths 10/24/2019   Able to walk? Yes   Fall in past 12 months? No   Fall with injury? -   Number of falls in past 12 months -   Fall Risk Score -       Abuse Screening:  :     Abuse Screening Questionnaire 10/24/2019 1/22/2019 4/9/2018 10/28/2014   Do you ever feel afraid of your partner? N N N N   Are you in a relationship with someone who physically or mentally threatens you? N N N N   Is it safe for you to go home? Y Y Y Y       Coordination of Care Questionnaire:  :     1) Have you been to an emergency room, urgent care clinic since your last visit? no   Hospitalized since your last visit? no             2) Have you seen or consulted any other health care providers outside of [address] [place] [address] since your last visit? no  (Include any pap smears or colon screenings in this section.)    3) Do you have an Advance Directive on file? no  Are you interested in receiving information about Advance Directives? no    Patient is accompanied by son I have received verbal consent from Shivam Dolan to discuss any/all   medical information while they are present in the room. Reviewed record in preparation for visit and have obtained necessary documentation.

## 2020-07-08 ENCOUNTER — TELEPHONE (OUTPATIENT)
Dept: FAMILY MEDICINE CLINIC | Age: 75
End: 2020-07-08

## 2020-07-08 NOTE — TELEPHONE ENCOUNTER
Pt has been back in hospital in regards to blood sugar and needs januvia changed     Call pt at [phone]

## 2020-08-09 DIAGNOSIS — E03.9 HYPOTHYROIDISM, UNSPECIFIED TYPE: ICD-10-CM

## 2020-08-09 DIAGNOSIS — M15.9 PRIMARY OSTEOARTHRITIS INVOLVING MULTIPLE JOINTS: ICD-10-CM

## 2020-08-14 RX ORDER — CELECOXIB 200 MG/1
200 CAPSULE ORAL DAILY
Qty: 90 CAP | Refills: 1 | Status: SHIPPED | OUTPATIENT
Start: 2020-08-14 | End: 2021-01-28 | Stop reason: SDUPTHER

## 2020-08-14 RX ORDER — LEVOTHYROXINE SODIUM 75 UG/1
TABLET ORAL
Qty: 90 TAB | Refills: 1 | Status: SHIPPED | OUTPATIENT
Start: 2020-08-14 | End: 2021-01-28 | Stop reason: SDUPTHER

## 2020-10-10 DIAGNOSIS — F32.A DEPRESSION, UNSPECIFIED DEPRESSION TYPE: ICD-10-CM

## 2020-10-12 RX ORDER — VENLAFAXINE HYDROCHLORIDE 75 MG/1
CAPSULE, EXTENDED RELEASE ORAL
Qty: 90 CAP | Refills: 1 | Status: SHIPPED | OUTPATIENT
Start: 2020-10-12 | End: 2021-01-28 | Stop reason: SDUPTHER

## 2020-11-07 DIAGNOSIS — E78.2 MIXED HYPERLIPIDEMIA: ICD-10-CM

## 2020-11-07 DIAGNOSIS — F41.9 ANXIETY: ICD-10-CM

## 2020-12-03 NOTE — TELEPHONE ENCOUNTER
----- Message from Madhavi Ashford sent at 12/2/2020  7:01 PM EST -----  Regarding: Dr. Marta Lambert first and last name: Sen Adameence    Reason for call: Rx refills                                   Callback required yes/no and why: Yes    Best contact number(s): [phone]    Details to clarify the request: Pt received a call from \"[facility]\" stating they have sent Dr. Rigoberto Abarca rx refill requests for multiple medications and have yet to hear back from her.

## 2020-12-12 DIAGNOSIS — F03.A0 MILD DEMENTIA: ICD-10-CM

## 2020-12-18 ENCOUNTER — VIRTUAL VISIT (OUTPATIENT)
Dept: FAMILY MEDICINE CLINIC | Age: 75
End: 2020-12-18
Payer: MEDICARE

## 2020-12-18 DIAGNOSIS — E03.9 HYPOTHYROIDISM, UNSPECIFIED TYPE: ICD-10-CM

## 2020-12-18 DIAGNOSIS — E53.8 VITAMIN B12 DEFICIENCY: ICD-10-CM

## 2020-12-18 DIAGNOSIS — E78.2 MIXED HYPERLIPIDEMIA: ICD-10-CM

## 2020-12-18 DIAGNOSIS — E11.9 TYPE 2 DIABETES MELLITUS WITHOUT COMPLICATION, WITHOUT LONG-TERM CURRENT USE OF INSULIN (HCC): Primary | ICD-10-CM

## 2020-12-18 PROCEDURE — 99441 PR PHYS/QHP TELEPHONE EVALUATION 5-10 MIN: CPT | Performed by: FAMILY MEDICINE

## 2020-12-18 RX ORDER — DONEPEZIL HYDROCHLORIDE 5 MG/1
TABLET, FILM COATED ORAL
Qty: 90 TAB | Refills: 1 | Status: SHIPPED | OUTPATIENT
Start: 2020-12-18

## 2020-12-18 RX ORDER — BUSPIRONE HYDROCHLORIDE 10 MG/1
TABLET ORAL
Qty: 180 TAB | Refills: 0 | OUTPATIENT
Start: 2020-12-18

## 2020-12-18 RX ORDER — ATORVASTATIN CALCIUM 80 MG/1
TABLET, FILM COATED ORAL
Qty: 90 TAB | Refills: 0 | OUTPATIENT
Start: 2020-12-18

## 2020-12-18 NOTE — PROGRESS NOTES
Yeny Canchola is a 66 y.o. female, evaluated via audio-only technology on 12/18/2020 for Diabetes and Hypothyroidism  . Assessment & Plan:   Diagnoses and all orders for this visit:    1. Type 2 diabetes mellitus without complication, without long-term current use of insulin [facility]): labs due and lab orders faxed to Chel Gonzalez. Continue with current therapy at this time.   -     HEMOGLOBIN A1C WITH EAG; Future  -     METABOLIC PANEL, COMPREHENSIVE; Future  -     LIPID PANEL; Future  -     MICROALBUMIN, UR, RAND W/ MICROALB/CREAT RATIO; Future    2. Mixed hyperlipidemia: check labs, continue with statin therapy. -     METABOLIC PANEL, COMPREHENSIVE; Future  -     LIPID PANEL; Future    3. Hypothyroidism, unspecified type: continue with levothyroxine and titrate dose as needed. -     TSH 3RD GENERATION; Future  -     T4, FREE; Future    4. Vitamin B12 deficiency: on daily supplement, due for recheck. -     VITAMIN B12; Future    12  Subjective:   Hypothyroidism: compliant with current therapy. Denies excessive fatigue, change in bowel habits. Diabetes mellitus: reports compliance with current therapy. Denies polyuria, polydipsia. No reported hypoglycemic episodes. Anxiety and Depression: mood has been stable on current therapy. Denies SI/HI. Prior to Admission medications    Medication Sig Start Date End Date Taking? Authorizing Provider   venlafaxine-SR [facility] 75 mg capsule Take 1 capsule by mouth daily. 10/12/20  Yes Yin Weinstein MD   levothyroxine (SYNTHROID) 75 mcg tablet Take 1 Tab by mouth Daily (before breakfast). 8/14/20  Yes Yin Weinstein MD   celecoxib (CELEBREX) 200 mg capsule Take 1 Cap by mouth daily. Indications: joint damage causing pain and loss of function 8/14/20  Yes Yin Weinstein MD   omeprazole (PRILOSEC) 40 mg capsule Take 1 Cap by mouth daily.  6/10/20  Yes Yin Weinstein MD   fenofibric acid [facility]) 135 mg capsule Take 1 Cap by mouth daily. 6/10/20  Yes Jim Hyman MD   buPROPion XL (WELLBUTRIN XL) 150 mg tablet Take 1 Tab by mouth daily. 6/10/20  Yes Jim Hyman MD   SITagliptin (Januvia) 25 mg tablet TAKE ONE TABLET BY MOUTH DAILY 6/10/20  Yes Jim Hyman MD   venlafaxine-SR [facility] 150 mg capsule Take 1 Cap by mouth daily. 6/10/20  Yes Jim Hyman MD   atorvastatin (LIPITOR) 80 mg tablet Take 1 Tab by mouth daily. 5/28/20  Yes Jim Hyman MD   busPIRone (BUSPAR) 10 mg tablet Take 1 Tab by mouth two (2) times a day. 5/18/20  Yes Jim Hyman MD   donepeziL (ARICEPT) 5 mg tablet Take 1 Tab by mouth nightly. 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 6/10/20 12/18/20  Jim Hyman MD   cyanocobalamin (Vitamin B-12) 1,000 mcg tablet Take 1 Tab by mouth daily. 5/28/20 12/18/20  Jim Hyman MD       Allergies   Allergen Reactions    Gabapentin Drowsiness    Deserpidine Other (comments)     Puts pressure around her head like a ring     Past Medical History:   Diagnosis Date    Depression     Hypercholesterolemia     BERTRAM on CPAP     Dr. Salvador Learn     Thyroid disease     Urinary incontinence      Social History     Tobacco Use    Smoking status: Never Smoker    Smokeless tobacco: Never Used   Substance Use Topics    Alcohol use: No       ROS   Pertinent items noted in HPI    No flowsheet data found. Edson Spicer, who was evaluated through a patient-initiated, synchronous (real-time) audio only encounter, and/or her healthcare decision maker, is aware that it is a billable service, with coverage as determined by her insurance carrier. She provided verbal consent to proceed: Yes. She has not had a related appointment within my department in the past 7 days or scheduled within the next 24 hours.       Total Time: minutes: [address], [place]

## 2020-12-21 ENCOUNTER — HOSPITAL ENCOUNTER (OUTPATIENT)
Dept: LAB | Age: 75
Discharge: HOME | End: 2020-12-21
Payer: MEDICARE

## 2020-12-21 PROCEDURE — 84443 ASSAY THYROID STIM HORMONE: CPT

## 2020-12-21 PROCEDURE — 84439 ASSAY OF FREE THYROXINE: CPT

## 2020-12-21 PROCEDURE — 36415 COLL VENOUS BLD VENIPUNCTURE: CPT

## 2020-12-21 PROCEDURE — 80061 LIPID PANEL: CPT

## 2020-12-21 PROCEDURE — 83036 HEMOGLOBIN GLYCOSYLATED A1C: CPT

## 2020-12-21 PROCEDURE — 80053 COMPREHEN METABOLIC PANEL: CPT

## 2020-12-21 PROCEDURE — 82043 UR ALBUMIN QUANTITATIVE: CPT

## 2020-12-21 PROCEDURE — 82607 VITAMIN B-12: CPT

## 2020-12-22 LAB
ALBUMIN SERPL-MCNC: 4.1 G/DL (ref 3.7–4.7)
ALBUMIN/CREAT UR: 9 MG/G CREAT (ref 0–29)
ALBUMIN/GLOB SERPL: 1.9 {RATIO} (ref 1.2–2.2)
ALP SERPL-CCNC: 64 IU/L (ref 39–117)
ALT SERPL-CCNC: 13 IU/L (ref 0–32)
AST SERPL-CCNC: 22 IU/L (ref 0–40)
BILIRUB SERPL-MCNC: 0.4 MG/DL (ref 0–1.2)
BUN SERPL-MCNC: 19 MG/DL (ref 8–27)
BUN/CREAT SERPL: 21 (ref 12–28)
CALCIUM SERPL-MCNC: 9.2 MG/DL (ref 8.7–10.3)
CHLORIDE SERPL-SCNC: 104 MMOL/L (ref 96–106)
CHOLEST SERPL-MCNC: 164 MG/DL (ref 100–199)
CO2 SERPL-SCNC: 25 MMOL/L (ref 20–29)
CREAT SERPL-MCNC: 0.9 MG/DL (ref 0.57–1)
CREAT UR-MCNC: 92.3 MG/DL
EST. AVERAGE GLUCOSE BLD GHB EST-MCNC: 143 MG/DL
GLOBULIN SER CALC-MCNC: 2.2 G/DL (ref 1.5–4.5)
GLUCOSE SERPL-MCNC: 150 MG/DL (ref 65–99)
HBA1C MFR BLD: 6.6 % (ref 4.8–5.6)
HDLC SERPL-MCNC: 61 MG/DL
INTERPRETATION, 910389: NORMAL
LDLC SERPL CALC-MCNC: 87 MG/DL (ref 0–99)
Lab: NORMAL
MICROALBUMIN UR-MCNC: 8.5 UG/ML
POTASSIUM SERPL-SCNC: 4.6 MMOL/L (ref 3.5–5.2)
PROT SERPL-MCNC: 6.3 G/DL (ref 6–8.5)
SODIUM SERPL-SCNC: 143 MMOL/L (ref 134–144)
T4 FREE SERPL-MCNC: 1.18 NG/DL (ref 0.82–1.77)
TRIGL SERPL-MCNC: 84 MG/DL (ref 0–149)
TSH SERPL DL<=0.005 MIU/L-ACNC: 6.89 UIU/ML (ref 0.45–4.5)
VIT B12 SERPL-MCNC: >2000 PG/ML (ref 232–1245)
VLDLC SERPL CALC-MCNC: 16 MG/DL (ref 5–40)

## 2021-01-06 DIAGNOSIS — E78.2 MIXED HYPERLIPIDEMIA: ICD-10-CM

## 2021-01-06 DIAGNOSIS — F32.A DEPRESSION, UNSPECIFIED DEPRESSION TYPE: ICD-10-CM

## 2021-01-06 RX ORDER — FENOFIBRIC ACID 135 MG/1
CAPSULE, DELAYED RELEASE ORAL
Qty: 90 CAP | Refills: 1 | Status: SHIPPED | OUTPATIENT
Start: 2021-01-06 | End: 2021-01-28 | Stop reason: SDUPTHER

## 2021-01-06 RX ORDER — BUPROPION HYDROCHLORIDE 150 MG/1
TABLET ORAL
Qty: 90 TAB | Refills: 1 | Status: SHIPPED | OUTPATIENT
Start: 2021-01-06 | End: 2021-01-28 | Stop reason: SDUPTHER

## 2021-01-06 RX ORDER — VENLAFAXINE HYDROCHLORIDE 150 MG/1
CAPSULE, EXTENDED RELEASE ORAL
Qty: 90 CAP | Refills: 1 | Status: SHIPPED | OUTPATIENT
Start: 2021-01-06 | End: 2021-01-28 | Stop reason: SDUPTHER

## 2021-01-14 ENCOUNTER — NURSE TRIAGE (OUTPATIENT)
Dept: OTHER | Facility: CLINIC | Age: 76
End: 2021-01-14

## 2021-01-14 NOTE — TELEPHONE ENCOUNTER
Reason for Disposition   SEVERE weakness (i.e., unable to walk or barely able to walk, requires support) and new onset or worsening    Answer Assessment - Initial Assessment Questions  1. DESCRIPTION: \"Describe how you are feeling. \"      Shaky, extreme weakness. Blood sugar is 165.    2. SEVERITY: \"How bad is it? \"  \"Can you stand and walk? \"    - MILD - Feels weak or tired, but does not interfere with work, school or normal activities    - [facility] to stand and walk; weakness interferes with work, school, or normal activities    - SEVERE - Unable to stand or walk      Moderate-, Can walk with assist    3. ONSET:  \"When did the weakness begin? \"      Began around a year ago, but I feel like it's getting worse. 4. CAUSE: \"What do you think is causing the weakness? \"      I don't know    5. MEDICINES: Eliot Teresitairenenegin you recently started a new medicine or had a change in the amount of a medicine? \"      No changes    6. OTHER SYMPTOMS: \"Do you have any other symptoms? \" (e.g., chest pain, fever, cough, SOB, vomiting, diarrhea, bleeding, other areas of pain)      Dizziness at times,  Shaky, SOB    7. PREGNANCY: \"Is there any chance you are pregnant? \" \"When was your last menstrual period? \"      no    Protocols used: WEAKNESS (GENERALIZED) AND FATIGUE-ADULT-OH  Patient called [email or web address] with red flag complaint. Brief description of triage: Worsening weakness. Pt sounded very weak Very concerning. Blood sugar 165. Trouble ambulating. Has fallen several times. Triage indicates for patient to Call 911/EMS. Pt states she can get her son to take her. Care advice provided, patient verbalizes understanding; denies any other questions or concerns; instructed to call back for any new or worsening symptoms. Attention Provider: Thank you for allowing me to participate in the care of your patient. The patient was connected to triage in response to information from calling the [employer].  Please do not respond through this encounter as the response is not directed to a shared pool. 1303- Called pt back to see what she had decided to do. She said nothing. I offered to call her MD and she said apparently I don't have an MD and hung up    1305-Called Nicki and spoke with Soni. She will call pt and offer pt an opportunity to speak with MD.    [facility] office. Spoke with [employer]. Message being sent to Dr. Tejas Mejia priority- concerning pt status and concerns.

## 2021-01-28 ENCOUNTER — OFFICE VISIT (OUTPATIENT)
Dept: FAMILY MEDICINE CLINIC | Age: 76
End: 2021-01-28
Payer: MEDICARE

## 2021-01-28 VITALS
HEART RATE: 67 BPM | HEIGHT: 63 IN | SYSTOLIC BLOOD PRESSURE: 128 MMHG | RESPIRATION RATE: 20 BRPM | BODY MASS INDEX: 29.09 KG/M2 | DIASTOLIC BLOOD PRESSURE: 70 MMHG | TEMPERATURE: 98.2 F | WEIGHT: 164.2 LBS | OXYGEN SATURATION: 96 %

## 2021-01-28 DIAGNOSIS — E03.9 HYPOTHYROIDISM, UNSPECIFIED TYPE: ICD-10-CM

## 2021-01-28 DIAGNOSIS — F32.A DEPRESSION, UNSPECIFIED DEPRESSION TYPE: ICD-10-CM

## 2021-01-28 DIAGNOSIS — J01.90 ACUTE RHINOSINUSITIS: Primary | ICD-10-CM

## 2021-01-28 DIAGNOSIS — F41.9 ANXIETY: ICD-10-CM

## 2021-01-28 DIAGNOSIS — R26.9 GAIT ABNORMALITY: ICD-10-CM

## 2021-01-28 DIAGNOSIS — K21.9 GASTROESOPHAGEAL REFLUX DISEASE WITHOUT ESOPHAGITIS: ICD-10-CM

## 2021-01-28 DIAGNOSIS — E11.9 TYPE 2 DIABETES MELLITUS WITHOUT COMPLICATION, WITHOUT LONG-TERM CURRENT USE OF INSULIN (HCC): ICD-10-CM

## 2021-01-28 DIAGNOSIS — M15.9 PRIMARY OSTEOARTHRITIS INVOLVING MULTIPLE JOINTS: ICD-10-CM

## 2021-01-28 DIAGNOSIS — R25.1 TREMOR: ICD-10-CM

## 2021-01-28 DIAGNOSIS — F03.A0 MILD DEMENTIA: ICD-10-CM

## 2021-01-28 DIAGNOSIS — R13.10 FOOD STICKS ON SWALLOWING: ICD-10-CM

## 2021-01-28 DIAGNOSIS — E78.2 MIXED HYPERLIPIDEMIA: ICD-10-CM

## 2021-01-28 LAB
COMMENT, HOLDF: NORMAL
SAMPLES BEING HELD,HOLD: NORMAL
T4 FREE SERPL-MCNC: 1.1 NG/DL (ref 0.8–1.5)
TSH SERPL DL<=0.05 MIU/L-ACNC: 3.06 UIU/ML (ref 0.36–3.74)

## 2021-01-28 PROCEDURE — G8419 CALC BMI OUT NRM PARAM NOF/U: HCPCS | Performed by: FAMILY MEDICINE

## 2021-01-28 PROCEDURE — G0463 HOSPITAL OUTPT CLINIC VISIT: HCPCS | Performed by: FAMILY MEDICINE

## 2021-01-28 PROCEDURE — G8427 DOCREV CUR MEDS BY ELIG CLIN: HCPCS | Performed by: FAMILY MEDICINE

## 2021-01-28 PROCEDURE — 1101F PT FALLS ASSESS-DOCD LE1/YR: CPT | Performed by: FAMILY MEDICINE

## 2021-01-28 PROCEDURE — 1090F PRES/ABSN URINE INCON ASSESS: CPT | Performed by: FAMILY MEDICINE

## 2021-01-28 PROCEDURE — 99214 OFFICE O/P EST MOD 30 MIN: CPT | Performed by: FAMILY MEDICINE

## 2021-01-28 PROCEDURE — G9717 DOC PT DX DEP/BP F/U NT REQ: HCPCS | Performed by: FAMILY MEDICINE

## 2021-01-28 PROCEDURE — G8400 PT W/DXA NO RESULTS DOC: HCPCS | Performed by: FAMILY MEDICINE

## 2021-01-28 PROCEDURE — G8536 NO DOC ELDER MAL SCRN: HCPCS | Performed by: FAMILY MEDICINE

## 2021-01-28 RX ORDER — VENLAFAXINE HYDROCHLORIDE 75 MG/1
CAPSULE, EXTENDED RELEASE ORAL
Qty: 90 CAP | Refills: 1 | Status: ON HOLD | OUTPATIENT
Start: 2021-01-28 | End: 2022-05-08

## 2021-01-28 RX ORDER — LEVOTHYROXINE SODIUM 75 UG/1
TABLET ORAL
Qty: 90 TAB | Refills: 1 | Status: SHIPPED | OUTPATIENT
Start: 2021-01-28

## 2021-01-28 RX ORDER — BUSPIRONE HYDROCHLORIDE 10 MG/1
10 TABLET ORAL 2 TIMES DAILY
Qty: 180 TAB | Refills: 1 | Status: ON HOLD | OUTPATIENT
Start: 2021-01-28 | End: 2022-05-08 | Stop reason: CLARIF

## 2021-01-28 RX ORDER — VENLAFAXINE HYDROCHLORIDE 150 MG/1
CAPSULE, EXTENDED RELEASE ORAL
Qty: 90 CAP | Refills: 1 | Status: SHIPPED | OUTPATIENT
Start: 2021-01-28

## 2021-01-28 RX ORDER — AMOXICILLIN AND CLAVULANATE POTASSIUM 875; 125 MG/1; MG/1
1 TABLET, FILM COATED ORAL EVERY 12 HOURS
Qty: 14 TAB | Refills: 0 | Status: SHIPPED | OUTPATIENT
Start: 2021-01-28 | End: 2021-02-04

## 2021-01-28 RX ORDER — CELECOXIB 200 MG/1
200 CAPSULE ORAL DAILY
Qty: 90 CAP | Refills: 1 | Status: SHIPPED | OUTPATIENT
Start: 2021-01-28

## 2021-01-28 RX ORDER — FENOFIBRIC ACID 135 MG/1
CAPSULE, DELAYED RELEASE ORAL
Qty: 90 CAP | Refills: 1 | Status: SHIPPED | OUTPATIENT
Start: 2021-01-28

## 2021-01-28 RX ORDER — ATORVASTATIN CALCIUM 80 MG/1
80 TABLET, FILM COATED ORAL DAILY
Qty: 90 TAB | Refills: 3 | Status: SHIPPED | OUTPATIENT
Start: 2021-01-28

## 2021-01-28 RX ORDER — BUPROPION HYDROCHLORIDE 150 MG/1
TABLET ORAL
Qty: 90 TAB | Refills: 1 | Status: SHIPPED | OUTPATIENT
Start: 2021-01-28

## 2021-01-28 RX ORDER — OMEPRAZOLE 40 MG/1
40 CAPSULE, DELAYED RELEASE ORAL DAILY
Qty: 90 CAP | Refills: 1 | Status: SHIPPED | OUTPATIENT
Start: 2021-01-28

## 2021-01-28 NOTE — PROGRESS NOTES
Identified pt with two pt identifiers(name and ).     Chief Complaint   Patient presents with    Fall     yesterday she fell 3 times - she has lost her balance - this has been for a while    Hearing Problem    Sinus Infection     headache    Dizziness        Health Maintenance Due   Topic    COVID-19 Vaccine (1 of 2)    DTaP/Tdap/Td series (1 - Tdap)    Shingrix Vaccine Age 50> (2 of 2)    GLAUCOMA SCREENING Q2Y     Eye Exam Retinal or Dilated     Foot Exam Q1     Medicare Yearly Exam        Wt Readings from Last 3 Encounters:   21 164 lb 3.2 oz (74.5 kg)   20 168 lb (76.2 kg)   20 168 lb (76.2 kg)     Temp Readings from Last 3 Encounters:   21 98.2 °F (36.8 °C) (Oral)   20 98 °F (36.7 °C) (Oral)   20 98.4 °F (36.9 °C) (Oral)     BP Readings from Last 3 Encounters:   21 128/70   20 120/60   20 128/80     Pulse Readings from Last 3 Encounters:   21 67   20 70   20 73         Learning Assessment:  :     Learning Assessment 10/28/2014   PRIMARY LEARNER Patient   HIGHEST LEVEL OF EDUCATION - PRIMARY LEARNER  GRADUATED HIGH SCHOOL OR GED   BARRIERS PRIMARY LEARNER NONE   CO-LEARNER CAREGIVER No   PRIMARY LANGUAGE ENGLISH    NEED No   LEARNER PREFERENCE PRIMARY LISTENING   LEARNING SPECIAL TOPICS no   ANSWERED BY self   RELATIONSHIP SELF   ASSESSMENT COMMENT no       Depression Screening:  :     3 most recent PHQ Screens 2021   Little interest or pleasure in doing things Not at all   Feeling down, depressed, irritable, or hopeless Nearly every day   Total Score PHQ 2 3   Trouble falling or staying asleep, or sleeping too much -   Feeling tired or having little energy -   Poor appetite, weight loss, or overeating -   Feeling bad about yourself - or that you are a failure or have let yourself or your family down -   Trouble concentrating on things such as school, work, reading, or watching TV -   Moving or speaking so slowly that other people could have noticed; or the opposite being so fidgety that others notice -   Thoughts of being better off dead, or hurting yourself in some way -   PHQ 9 Score -   How difficult have these problems made it for you to do your work, take care of your home and get along with others -       Fall Risk Assessment:  :     Fall Risk Assessment, last 12 mths 1/28/2021   Able to walk? Yes   Fall in past 12 months? 1   Do you feel unsteady? 1   Are you worried about falling 1   Number of falls in past 12 months 2   Fall with injury? 0       Abuse Screening:  :     Abuse Screening Questionnaire 1/28/2021 10/24/2019 1/22/2019 4/9/2018 10/28/2014   Do you ever feel afraid of your partner? N N N N N   Are you in a relationship with someone who physically or mentally threatens you? N N N N N   Is it safe for you to go home? Y Y Y Y Y       Coordination of Care Questionnaire:  :     1) Have you been to an emergency room, urgent care clinic since your last visit? no   Hospitalized since your last visit? no            2) Have you seen or consulted any other health care providers outside of [address] [place] [address] since your last visit? no   (Include any pap smears or colon screenings in this section.)    3) Do you have an Advance Directive on file? no  Are you interested in receiving information about Advance Directives? no    Patient is accompanied by - her son is here -  I have received verbal consent from Jevon Villatoro to discuss any/all medical information while they are present in the room. Reviewed record in preparation for visit and have obtained necessary documentation. Medication reconciliation up to date and corrected with patient at this time.

## 2021-01-28 NOTE — PATIENT INSTRUCTIONS

## 2021-01-28 NOTE — PROGRESS NOTES
Subjective: Nettie Bishop is a 66 y.o. female here for follow up. Reports increasing falls and states that she falls at least 2 times per day. Feels unbalanced and lightheaded. She does hold onto the wall to help with her walking. Has single point cane and walker which she states that she does use. No reported visual disturbance. Has paresthesias of the feet (worse on the left). Feels food is getting stuck in her throat for about 6 months. On omeprazole 40 mg which she reports compliance. Right sinus pressure and congestion. Onset was several days ago. No fevers noted. Has her most recent prescription list from [facility] and will be due for refills in mid-February. Will send refills today as to not interrupt her service. Current Outpatient Medications   Medication Sig Dispense Refill    venlafaxine-SR (EFFEXOR-XR) 150 mg capsule Take 1 capsule by mouth daily. 90 Cap 1    buPROPion XL (WELLBUTRIN XL) 150 mg tablet Take 1 tablet by mouth daily 90 Tab 1    fenofibric acid (TRILIPIX ER) 135 mg capsule Take 1 capsule by mouth daily. 90 Cap 1    donepeziL (ARICEPT) 5 mg tablet Take 1 tablet by mouth nightly for mild to moderate Alzheimer's type Dementia. 90 Tab 1    venlafaxine-SR (EFFEXOR-XR) 75 mg capsule Take 1 capsule by mouth daily. 90 Cap 1    levothyroxine (SYNTHROID) 75 mcg tablet Take 1 Tab by mouth Daily (before breakfast). 90 Tab 1    celecoxib (CELEBREX) 200 mg capsule Take 1 Cap by mouth daily. Indications: joint damage causing pain and loss of function 90 Cap 1    omeprazole (PRILOSEC) 40 mg capsule Take 1 Cap by mouth daily. 90 Cap 1    SITagliptin (Januvia) 25 mg tablet TAKE ONE TABLET BY MOUTH DAILY 90 Tab 1    busPIRone (BUSPAR) 10 mg tablet Take 1 Tab by mouth two (2) times a day. 180 Tab 1    atorvastatin (LIPITOR) 80 mg tablet Take 1 Tab by mouth daily.  90 Tab 1       Allergies   Allergen Reactions    Gabapentin Drowsiness    Deserpidine Other (comments)     Puts pressure around her head like a ring       Past Medical History:   Diagnosis Date    Depression     Hypercholesterolemia     BERTRAM on CPAP     Dr. Bhumi Jimenez     Thyroid disease     Urinary incontinence        Social History     Tobacco Use    Smoking status: Never Smoker    Smokeless tobacco: Never Used   Substance Use Topics    Alcohol use: No        Review of Systems  Pertinent items are noted in HPI. Objective:     Visit Vitals  /70 (BP 1 Location: Right upper arm, BP Patient Position: Sitting) Comment: manual   Pulse 67   Temp 98.2 °F (36.8 °C) (Oral)   Resp 20   Ht 5' 3\" (1.6 m)   Wt 164 lb 3.2 oz (74.5 kg)   SpO2 96%   BMI 29.09 kg/m²      General appearance - alert, well appearing, and in no distress  Eyes - pupils equal and reactive, extraocular eye movements intact, sclera anicteric  Ears - bilateral TM's and external ear canals normal  Nasal exam - mucosal congestion, mucosal erythema and sinus tenderness noted  Neck - supple, no significant adenopathy  Chest - clear to auscultation, no wheezes, rales or rhonchi, symmetric air entry, no tachypnea, retractions or cyanosis  Heart - normal rate, regular rhythm, normal S1, S2, no murmurs, rubs, clicks or gallops  Neurological - alert, oriented, normal speech, mild BUE tremors noted, gait is unsteady    Assessment/Plan:   David Velazquez is a 66 y.o. female seen for:     1. Acute rhinosinusitis  - amoxicillin-clavulanate (AUGMENTIN) 875-125 mg per tablet; Take 1 Tab by mouth every twelve (12) hours for 7 days. Dispense: 14 Tab; Refill: 0  - warm compresses to sinuses, OTC analgesic of choice for pain    2. Food sticks on swallowing: x several months. Recommend GI evaluation and referral placed. - REFERRAL TO GASTROENTEROLOGY    3. Gait abnormality: has been seen by Neurology in [place] several years ago; has paresthesias of the feet. H/o vitamin B12 deficiency which has been supplemented. Has been lost to follow up.  Recommend Neurology evaluation to ensure no other etiology for her abnormal gait. Continue to use cane and walker.   - REFERRAL TO NEUROLOGY    4. Tremor: noticed today in the hands and arms.   - REFERRAL TO NEUROLOGY    5. Mild dementia (HCC)  - REFERRAL TO NEUROLOGY    6. Mixed hyperlipidemia  - atorvastatin (LIPITOR) 80 mg tablet; Take 1 Tab by mouth daily. Dispense: 90 Tab; Refill: 3  - fenofibric acid (TRILIPIX ER) 135 mg capsule; Take 1 capsule by mouth daily. Dispense: 90 Cap; Refill: 1    7. Hypothyroidism, unspecified type: mild elevation in TSH at last check. Recheck TFTs today and adjust levothyroxine dose accordingly.   - TSH 3RD GENERATION; Future  - T4, FREE; Future  - levothyroxine (SYNTHROID) 75 mcg tablet; Take 1 Tab by mouth Daily (before breakfast). Dispense: 90 Tab; Refill: 1    8. Depression, unspecified depression type: not addressed today, medications refill as will be due soon. - venlafaxine-SR (EFFEXOR-XR) 150 mg capsule; Take 1 capsule by mouth daily. Dispense: 90 Cap; Refill: 1  - buPROPion XL (WELLBUTRIN XL) 150 mg tablet; Take 1 tablet by mouth daily  Dispense: 90 Tab; Refill: 1  - venlafaxine-SR (EFFEXOR-XR) 75 mg capsule; Take 1 capsule by mouth daily. Dispense: 90 Cap; Refill: 1    9. Primary osteoarthritis involving multiple joints: not addressed today, medications refill as will be due soon. - celecoxib (CELEBREX) 200 mg capsule; Take 1 Cap by mouth daily. Indications: joint damage causing pain and loss of function  Dispense: 90 Cap; Refill: 1    10. Gastroesophageal reflux disease without esophagitis: controlled. - omeprazole (PRILOSEC) 40 mg capsule; Take 1 Cap by mouth daily. Dispense: 90 Cap; Refill: 1    11. Type 2 diabetes mellitus without complication, without long-term current use of insulin ([facility] [address]): not addressed today, medications refill as will be due soon.   - SITagliptin (Januvia) 25 mg tablet; TAKE ONE TABLET BY MOUTH DAILY  Dispense: 90 Tab; Refill: 1    12.  Anxiety: not addressed today, medications refill as will be due soon. - busPIRone (BUSPAR) 10 mg tablet; Take 1 Tab by mouth two (2) times a day. Dispense: 180 Tab; Refill: 1        I have discussed the diagnosis with the patient and the intended plan as seen in the above orders. The patient has received an after-visit summary and questions were answered concerning future plans. I have discussed medication side effects and warnings with the patient as well. Patient verbalizes understanding of plan of care and denies further questions or concerns at this time. Informed patient to return to the office if symptoms worsen or if new symptoms arise.

## 2021-02-04 ENCOUNTER — TELEPHONE (OUTPATIENT)
Dept: FAMILY MEDICINE CLINIC | Age: 76
End: 2021-02-04

## 2021-02-04 NOTE — TELEPHONE ENCOUNTER
Son called his mother was in the ER last night patient has diarrhea  Patient fell out when I was on the phone to schedule an Appointment,  Son was calling emergency crew.

## 2021-02-08 ENCOUNTER — TELEPHONE (OUTPATIENT)
Dept: FAMILY MEDICINE CLINIC | Age: 76
End: 2021-02-08

## 2021-02-08 NOTE — TELEPHONE ENCOUNTER
----- Message from Nilton Rojas sent at 2/8/2021  9:52 AM EST -----  Regarding: Dr. Esther Tierney Message/Vendor Calls    Caller's first and last name:Self      Reason for call: Pt advised possible vaginal infection wanted to clarify if she can take medicine she already has at home.       Callback required yes/no and why: Yes, to clarify      Best contact number(s):[phone]      Details to clarify the request: N/A      Nilton Rojas

## 2021-03-11 ENCOUNTER — OFFICE VISIT (OUTPATIENT)
Dept: NEUROLOGY | Age: 76
End: 2021-03-11
Payer: MEDICARE

## 2021-03-11 VITALS
HEART RATE: 74 BPM | SYSTOLIC BLOOD PRESSURE: 148 MMHG | DIASTOLIC BLOOD PRESSURE: 80 MMHG | OXYGEN SATURATION: 99 % | RESPIRATION RATE: 20 BRPM | TEMPERATURE: 97.5 F

## 2021-03-11 DIAGNOSIS — R26.81 GAIT INSTABILITY: ICD-10-CM

## 2021-03-11 DIAGNOSIS — F02.80 LATE ONSET ALZHEIMER'S DISEASE WITHOUT BEHAVIORAL DISTURBANCE (HCC): Primary | ICD-10-CM

## 2021-03-11 DIAGNOSIS — M47.16 LUMBAR SPONDYLOSIS WITH MYELOPATHY: ICD-10-CM

## 2021-03-11 DIAGNOSIS — F32.1 MODERATE MAJOR DEPRESSION (HCC): ICD-10-CM

## 2021-03-11 DIAGNOSIS — G30.1 LATE ONSET ALZHEIMER'S DISEASE WITHOUT BEHAVIORAL DISTURBANCE (HCC): Primary | ICD-10-CM

## 2021-03-11 DIAGNOSIS — F80.81 STUTTERING: ICD-10-CM

## 2021-03-11 DIAGNOSIS — G25.0 ESSENTIAL TREMOR: ICD-10-CM

## 2021-03-11 DIAGNOSIS — F41.9 ANXIETY WITH SOMATIZATION: ICD-10-CM

## 2021-03-11 DIAGNOSIS — F45.0 ANXIETY WITH SOMATIZATION: ICD-10-CM

## 2021-03-11 PROCEDURE — G9717 DOC PT DX DEP/BP F/U NT REQ: HCPCS | Performed by: PSYCHIATRY & NEUROLOGY

## 2021-03-11 PROCEDURE — 99205 OFFICE O/P NEW HI 60 MIN: CPT | Performed by: PSYCHIATRY & NEUROLOGY

## 2021-03-11 PROCEDURE — G8419 CALC BMI OUT NRM PARAM NOF/U: HCPCS | Performed by: PSYCHIATRY & NEUROLOGY

## 2021-03-11 PROCEDURE — G8427 DOCREV CUR MEDS BY ELIG CLIN: HCPCS | Performed by: PSYCHIATRY & NEUROLOGY

## 2021-03-11 PROCEDURE — G8400 PT W/DXA NO RESULTS DOC: HCPCS | Performed by: PSYCHIATRY & NEUROLOGY

## 2021-03-11 PROCEDURE — 1101F PT FALLS ASSESS-DOCD LE1/YR: CPT | Performed by: PSYCHIATRY & NEUROLOGY

## 2021-03-11 PROCEDURE — 1090F PRES/ABSN URINE INCON ASSESS: CPT | Performed by: PSYCHIATRY & NEUROLOGY

## 2021-03-11 PROCEDURE — G8536 NO DOC ELDER MAL SCRN: HCPCS | Performed by: PSYCHIATRY & NEUROLOGY

## 2021-03-11 NOTE — PATIENT INSTRUCTIONS
Recovering From Depression: Care Instructions Your Care Instructions Taking good care of yourself is important as you recover from depression. In time, your symptoms will fade as your treatment takes hold. Do not give up. Instead, focus your energy on getting better. Your mood will improve. It just takes some time. Focus on things that can help you feel better, such as being with friends and family, eating well, and getting enough rest. But take things slowly. Do not do too much too soon. You will begin to feel better gradually. Follow-up care is a key part of your treatment and safety. Be sure to make and go to all appointments, and call your doctor if you are having problems. It's also a good idea to know your test results and keep a list of the medicines you take. How can you care for yourself at home? Be realistic · If you have a large task to do, break it up into smaller steps you can handle, and just do what you can. · You may want to put off important decisions until your depression has lifted. If you have plans that will have a major impact on your life, such as marriage, divorce, or a job change, try to wait a bit. Talk it over with friends and loved ones who can help you look at the overall picture first. 
· Reaching out to people for help is important. Do not isolate yourself. Let your family and friends help you. Find someone you can trust and confide in, and talk to that person. · Be patient, and be kind to yourself. Remember that depression is not your fault and is not something you can overcome with willpower alone. Treatment is important for depression, just like for any other illness. Feeling better takes time, and your mood will improve little by little. Stay active · Stay busy and get outside. Take a walk, or try some other light exercise. · Talk with your doctor about an exercise program. Exercise can help with mild depression. · Go to a movie or concert.  Take part in a [employer] activity or other social gathering. Go to a [employer] game. · Ask a friend to have dinner with you. Take care of yourself · Eat a balanced diet with plenty of fresh fruits and vegetables, whole grains, and lean protein. If you have lost your appetite, eat small snacks rather than large meals. · Avoid using illegal drugs or marijuana and drinking alcohol. Do not take medicines that have not been prescribed for you. They may interfere with medicines you may be taking for depression, or they may make your depression worse. · Take your medicines exactly as they are prescribed. You may start to feel better within 1 to 3 weeks of taking antidepressant medicine. But it can take as many as 6 to 8 weeks to see more improvement. If you have questions or concerns about your medicines, or if you do not notice any improvement by 3 weeks, talk to your doctor. · Continue to take your medicine after your symptoms improve. Taking your medicine for at least 6 months after you feel better can help keep you from getting depressed again. If this isn't the first time you have been depressed, your doctor may recommend you to take medicine even longer. · If you have any side effects from your medicine, tell your doctor. Many side effects are mild and will go away on their own after you have been taking the medicine for a few weeks. Some may last longer. Talk to your doctor if side effects are bothering you too much. You might be able to try a different medicine. · Continue counseling. It may help prevent depression from returning, especially if you've had multiple episodes of depression. Talk with your counselor if you are having a hard time attending your sessions or you think the sessions aren't working. Don't just stop going. · Get enough sleep. Talk to your doctor if you are having problems sleeping. · Avoid sleeping pills unless they are prescribed by the doctor treating your depression.  Sleeping pills may make you groggy during the day, and they may interact with other medicine you are taking. · If you have any other illnesses, such as diabetes, heart disease, or high blood pressure, make sure to continue with your treatment. Tell your doctor about all of the medicines you take, including those with or without a prescription. · If you or someone you know talks about suicide, self-harm, or feeling hopeless, get help right away. Call the [address] [place] [address] at 1-800-273-talk (3-579.792.7227) or text HOME to 831326 to access the Crisis Text Line. Consider saving these numbers in your phone. When should you call for help? Call 911 anytime you think you may need emergency care. For example, call if: 
  · You feel like hurting yourself or someone else.  
  · Someone you know has depression and is about to attempt or is attempting suicide. Call your doctor now or seek immediate medical care if: 
  · You hear voices.  
  · Someone you know has depression and: 
? Starts to give away his or her possessions. ? Uses illegal drugs or drinks alcohol heavily. ? Talks or writes about death, including writing suicide notes or talking about guns, knives, or pills. ? Starts to spend a lot of time alone. ? Acts very aggressively or suddenly appears calm. Watch closely for changes in your health, and be sure to contact your doctor if: 
  · You do not get better as expected. Where can you learn more? Go to http://www.gray.com/ Enter A931 in the search box to learn more about \"Recovering From Depression: Care Instructions. \" Current as of: January 31, 2020               Content Version: 12.6 © 3843-4529 [employer]. Care instructions adapted under license by [employer] (which disclaims liability or warranty for this information).  If you have questions about a medical condition or this instruction, always ask your healthcare professional. Valentino Amble disclaims any warranty or liability for your use of this information. Preventing Falls: Care Instructions Your Care Instructions Getting around your home safely can be a challenge if you have injuries or health problems that make it easy for you to fall. Loose rugs and furniture in walkways are among the dangers for many older people who have problems walking or who have poor eyesight. People who have conditions such as arthritis, osteoporosis, or dementia also have to be careful not to fall. You can make your home safer with a few simple measures. Follow-up care is a key part of your treatment and safety. Be sure to make and go to all appointments, and call your doctor if you are having problems. It's also a good idea to know your test results and keep a list of the medicines you take. How can you care for yourself at home? Taking care of yourself · You may get dizzy if you do not drink enough water. To prevent dehydration, drink plenty of fluids, enough so that your urine is light yellow or clear like water. Choose water and other caffeine-free clear liquids. If you have kidney, heart, or liver disease and have to limit fluids, talk with your doctor before you increase the amount of fluids you drink. · Exercise regularly to improve your strength, muscle tone, and balance. Walk if you can. Swimming may be a good choice if you cannot walk easily. · Have your vision and hearing checked each year or any time you notice a change. If you have trouble seeing and hearing, you might not be able to avoid objects and could lose your balance. · Know the side effects of the medicines you take. Ask your doctor or pharmacist whether the medicines you take can affect your balance. Sleeping pills or sedatives can affect your balance. · Limit the amount of alcohol you drink. Alcohol can impair your balance and other senses. · Ask your doctor whether calluses or corns on your feet need to be removed.  If you wear loose-fitting shoes because of calluses or corns, you can lose your balance and fall. · Talk to your doctor if you have numbness in your feet. Preventing falls at home · Remove raised doorway thresholds, throw rugs, and clutter. Repair loose carpet or raised areas in the floor. · Move furniture and electrical cords to keep them out of walking paths. · Use nonskid floor wax, and wipe up spills right away, especially on ceramic tile floors. · If you use a walker or cane, put rubber tips on it. If you use crutches, clean the bottoms of them regularly with an abrasive pad, such as steel wool. · Keep your house well lit, especially [facility], and outside walkways. Use night-lights in areas such as hallways and bathrooms. Add extra light switches or use remote switches (such as switches that go on or off when you clap your hands) to make it easier to turn lights on if you have to get up during the night. · Install sturdy handrails on stairways. · Move items in your cabinets so that the things you use a lot are on the lower shelves (about waist level). · Keep a cordless phone and a flashlight with new batteries by your bed. If possible, put a phone in each of the main rooms of your house, or carry a cell phone in case you fall and cannot reach a phone. Or, you can wear a device around your neck or wrist. You push a button that sends a signal for help. · Wear low-heeled shoes that fit well and give your feet good support. Use footwear with nonskid soles. Check the heels and soles of your shoes for wear. Repair or replace worn heels or soles. · Do not wear socks without shoes on wood floors. · Walk on the grass when the sidewalks are slippery. If you live in an area that gets snow and ice in the winter, sprinkle salt on slippery steps and sidewalks. Preventing falls in the bath · Install grab bars and nonskid mats inside and outside your shower or tub and near the toilet and sinks.  
· Use shower chairs and bath benches. · Use a hand-held shower head that will allow you to sit while showering. · Get into a tub or shower by putting the weaker leg in first. Get out of a tub or shower with your strong side first. 
· Repair loose toilet seats and consider installing a raised toilet seat to make getting on and off the toilet easier. · Keep your bathroom door unlocked while you are in the shower. Where can you learn more? Go to http://www.todd.com/ Enter [phone] [id number] [phone] in the search box to learn more about \"Preventing Falls: Care Instructions. \" Current as of: April 15, 2020               Content Version: 12.6 © 8116-7532 [employer]. Care instructions adapted under license by [employer] (which disclaims liability or warranty for this information). If you have questions about a medical condition or this instruction, always ask your healthcare professional. [address] any warranty or liability for your use of this information.

## 2021-03-11 NOTE — LETTER
3/15/2021 Patient: Isabella Rasmussen YOB: 1945 Date of Visit: 3/11/2021 Elroy Wild MD 
[address] [facility] [address] [facility] [id number] [address] [facility] [address] Dear Elroy Wild MD, Thank you for referring Ms. Дмитрий Schuster to [facility] for evaluation. My notes for this consultation are attached. If you have questions, please do not hesitate to call me. I look forward to following your patient along with you. Sincerely, Tra Friedman MD

## 2021-03-11 NOTE — PROGRESS NOTES
NEUROLOGY CLINIC NOTE    Patient ID:  Chanel Azevedo  [id number]  71 y.o.  1945    Date of Consultation:  March 11, 2021    Referring Physician: Dr. Blossom Juarez    Reason for Consultation:  Dementia, tremors and stuttering    Chief Complaint   Patient presents with    New Patient     tremors, stuttering (doesnt know why)        History of Present Illness:     Patient Active Problem List    Diagnosis Date Noted    BERTRAM on CPAP     Type II or unspecified type diabetes mellitus without mention of complication, not stated as uncontrolled 11/24/2014    Hypothyroid 10/28/2014    HLD (hyperlipidemia) 10/28/2014    Depression 10/28/2014    Anxiety 10/28/2014    Incontinence of urine in female 10/28/2014    OA (osteoarthritis) 10/28/2014     Past Medical History:   Diagnosis Date    Depression     Hypercholesterolemia     BERTRAM on CPAP     Dr. Valencia Sero     Thyroid disease     Urinary incontinence       Past Surgical History:   Procedure Laterality Date    HX COLONOSCOPY      HX ENDOSCOPY      HX GYN      HX HEENT      HX ORTHOPAEDIC      WA ABDOMEN SURGERY PROC UNLISTED        Prior to Admission medications    Medication Sig Start Date End Date Taking? Authorizing Provider   atorvastatin (LIPITOR) 80 mg tablet Take 1 Tab by mouth daily. 1/28/21  Yes Mika Mathews MD   venlafaxine-SR [facility] 150 mg capsule Take 1 capsule by mouth daily. 1/28/21  Yes Mika Mathews MD   buPROPion XL (WELLBUTRIN XL) 150 mg tablet Take 1 tablet by mouth daily 1/28/21  Yes Mika Mathews MD   fenofibric acid [facility]) 135 mg capsule Take 1 capsule by mouth daily. 1/28/21  Yes Mika Mathews MD   venlafaxine-SR [facility] 75 mg capsule Take 1 capsule by mouth daily. 1/28/21  Yes Mika Mathews MD   levothyroxine (SYNTHROID) 75 mcg tablet Take 1 Tab by mouth Daily (before breakfast). 1/28/21  Yes Mika Mathews MD   celecoxib (CELEBREX) 200 mg capsule Take 1 Cap by mouth daily. Indications: joint damage causing pain and loss of function 1/28/21  Yes Pily Crenshaw MD   omeprazole (PRILOSEC) 40 mg capsule Take 1 Cap by mouth daily. 1/28/21  Yes Pily Crenshaw MD   SITagliptin (Januvia) 25 mg tablet TAKE ONE TABLET BY MOUTH DAILY 1/28/21  Yes Pily Crenshaw MD   busPIRone (BUSPAR) 10 mg tablet Take 1 Tab by mouth two (2) times a day. 1/28/21  Yes Pily Crenshaw MD   donepeziL (ARICEPT) 5 mg tablet Take 1 tablet by mouth nightly for mild to moderate Alzheimer's type Dementia. 12/18/20  Yes Pily Crenshaw MD     Allergies   Allergen Reactions    Gabapentin Drowsiness    Deserpidine Other (comments)     Puts pressure around her head like a ring      Social History     Tobacco Use    Smoking status: Never Smoker    Smokeless tobacco: Never Used   Substance Use Topics    Alcohol use: No      Family History   Problem Relation Age of Onset    No Known Problems Mother     No Known Problems Father         Subjective: Josh Stevens is a 66 y.o. VIQR with history of dementia, lumbar spondylosis, DM, B12 deficiency, anxiety, depression, BERTRAM, hypercholesterolemia and thyroid disorder who was referred here by Dr. Willie Cano for further evaluation of her dementia, tremors and speech difficulties. Review of records revealed:  Patient was last seen by her PCP 1/28/2021 and note mentions patient having increase issues with falling at least twice a day. Feels unbalanced and lightheaded. Holds onto the walls to help her with walking. Complains of left greater than right foot paresthesias. Exam revealed bilateral mild upper extremity tremors and unsteady gait. Note mentions patient previously seen by neurology in [place] with history of B12 deficiency that has been supplemented. Referred here for further evaluation. Labs done revealed normal free T4 and TSH.   Labs done 12/21/2020 revealed slightly elevated hemoglobin A1c at 6.6, elevated TSH at 6.89 with normal free T4, elevated B12, normal lipid panel and CMP revealing increased blood sugar. Head CT without contrast done 6/8/2020 compared to an MRI done 5/15/2020 revealed no acute intracranial abnormality. Lumbar spine MRI without contrast done 12/29/2017 revealed L2-3 mild spinal canal stenosis. L3-4 moderate to severe spinal canal stenosis. L4-5 pronounced spinal canal stenosis with left neuroforaminal narrowing. L5-S1 mild spinal canal stenosis with left foraminal narrowing. Neuropsychological testing done 12/10/2019 revealed mild to moderate dementia exacerbated by depression, anxiety, stress and related physical concerns. Recommended medication for memory, attention, depression and anxiety. Psychiatry consult was recommended. Supportive counseling was advised. Patient was not found competent to make informed medical decision, financial decisions, devote, to  or to own a firearm. Part of  was recommended. Diagnosed with moderate major depression, anxiety with somatization and passive suicidal ideation. Patient was started on donepezil 5 mg daily by her PCP on a clinic visit 1/6/2020. Patient was unaccompanied to her visit initially. She reports no major changes to her issue. She still has short term memory lapses but able to do activities of daily living (bathing, personal hygiene, choosing her clothes, eating, sleeping and etc.) well. Son concurs. Patient has been having mild hand tremors for the past several months that does not affect her activities of daily living. She does not spill her food or drink. Seems to be worse when she is more anxious. Patient also has intermittent stuttering of her speech. Patient has issues with gait stability but prefers using a single cane. She has had episodes of falling. She does feel wobbly and unsteady. She does have numbness in her feet. She does have a walker at home which she does not use routinely.   She continues to take donepezil 5 mg daily. Denies any side effects. She does have a history of lumbar spondylosis and was seen by Dr. Marjorie Asif (orthopedics). Note mentions discussion about nonoperative and operative interventions. Recommended physical therapy and reevaluation in 6 months. Outside reports reviewed: office notes, radiology reports, lab reports, historical medical records. Review of Systems:    A comprehensive review of systems was performed:   Constitutional: positive for none  Eyes: positive for none  Ears, nose, mouth, throat, and face: positive for none  Respiratory: positive for none  Cardiovascular: positive for none  Gastrointestinal: positive for none  Genitourinary: positive for none  Integument/breast: positive for none  Hematologic/lymphatic: positive for none  Musculoskeletal: positive for back pain  Neurological: positive for tremors, memory loss, stuttering  Behavioral/Psych: positive for none  Endocrine: positive for none  Allergic/Immunologic: positive for none      Objective:     Visit Vitals  BP (!) 148/80   Pulse 74   Temp 97.5 °F (36.4 °C)   Resp 20   SpO2 99%     PHYSICAL EXAM:    General Appearance: Alert, patient appears stated age. General:  Well developed, well nourished, patient in no apparent distress. Head/Face: The head is normocephalic and atraumatic. Eyes: Conjunctivae appear normal. Sclera appear normal and non-icteric. Nose (and Sinus):   No abnormality of the nose or sinuses is noted. Oral:   Throat is clear. Lymphatics:  No lymphadenopathy in the neck/head. Neck and Thyroid:   No bruits noted in the neck. Respiratory:  Lungs clear to auscultation. Cardiovascular:  Palpation and auscultation: regular rate and rhythm. Extremity: No joint swelling, erythema or pedal edema. NEUROLOGICAL EXAM:    Appearance: The patient is well developed, well nourished, provides a coherent history and is in no acute distress. Mental Status: Oriented to time, place and person. Fluent, no aphasia or dysarthria. Intermittent stuttering. Mood and affect appropriate. MMSE 26/30 (problems with time orientation and immediate recall)     Cranial Nerves:   Intact visual fields. TAYLOR, EOM's full, no nystagmus, no ptosis. Facial sensation is normal. Corneal reflexes are intact. Facial movement is symmetric. Hearing is normal bilaterally. Palate is midline with normal elevation. Sternocleidomastoid and trapezius muscles are normal. Tongue is midline. Motor:  5/5 strength in upper and lower proximal and distal muscles. Normal bulk and tone. No cogwheel rigidity. No fasciculations. No pronator drift. Reflexes:   Deep tendon reflexes 1+/4 and symmetrical. Downgoing toes. Sensory:   Normal to cold, pinprick and vibration. Gait:  Wide based and unsteady gait. No Romberg. Tremor:   Intermittent head and arm/hand tremors both postural and intentional. No resting pill rolling tremors. Cerebellar:  Intact FTN/JAI/HTS. No bradykinesia. Neurovascular:  Normal heart sounds and regular rhythm, peripheral pulses intact, and no carotid bruits. Imaging  CT Head, lumbar MRI: reviewed    Labs Reviewed      Assessment:   Dementia  Lumbar spondylosis with myelopathy  Stuttering  Essential tremors  Gait instability  Major depression  Anxiety with somatization    Plan:   Neurological examination does not reveal any focal findings. Cognitive testing was done and patient scored 26/30. Problems with time orientation and immediate recall. Previous neuropsychological testing revealed mild to moderate dementia. This is compounded by significant psychiatric issues. Continue donepezil 5 mg daily for now. No indication currently to increase dosing. Recommend continued supervision. Patient is advised to do routine structured physical and mental exercises. Patient with history of lumbar spondylosis with myelopathy worse at L3-L5. Likely cause of her issue with gait instability and falling. Referred to physical therapy for strengthening and balance improvement. May need reevaluation by orthopedics as previously planned. Advised to use a walker instead of a cane for stability. Continue fall precautions. Stuttering intermittently seen which is more of a regression of his speech pattern typically seen in psych issues. No intervention necessary at this time. Exam reveals postural and intentional upper extremity and head tremors. Findings typically seen in essential tremors. No findings typically seen in Parkinson's disease. Currently does not affect her activities of daily living. Potential trial of medications if it does. Monitor for now. Patient having issues with gait that likely is due to her lumbar disc disease. Referred to physical therapy. Neuropsychological testing revealed issues with major depression and anxiety with somatization. Stuttering is likely part of the somatization. Recommend continued aggressive care with medication, follow-up with psychiatry and counseling. All questions and concerns were answered. Visit lasted 70 minutes. Greater than 50% was spent reviewing her medical records as summarized above, discussion with her and her son her current condition, etiology, prognosis, review of previous neuropsychological testing results showing dementia, continued supervision, continue mental and routine structured exercises, fall precautions, discussion about her previous lumbar MRI showing spinal stenosis and its effect on her gait stability, recommend reevaluation by orthopedics, referral to physical therapy, continued treatment for her depression and anxiety    This note was created using voice recognition software. Despite editing, there may be syntax errors.

## 2021-03-11 NOTE — PROGRESS NOTES
Tremors- has had them probably about 6 months or more   The stuttering she has had the same amount of time     Mild dementia- hasn't been very good lately

## 2021-03-18 ENCOUNTER — TELEPHONE (OUTPATIENT)
Dept: FAMILY MEDICINE CLINIC | Age: 76
End: 2021-03-18

## 2021-03-18 NOTE — TELEPHONE ENCOUNTER
----- Message from [facility] sent at 3/18/2021 10:16 AM EDT -----  Regarding: Dr. Erin Marks Message/Vendor Calls    Caller's first and last name: Pt      Reason for call: Requests help finding a councelor with a focus on older pts      Callback required yes/no and why: Yes to discuss      Best contact number(s): [phone]      Details to clarify the request: N/A      [facility]

## 2021-03-23 LAB — HBA1C MFR BLD HPLC: 6.7 %

## 2021-09-30 LAB — HBA1C MFR BLD HPLC: 6.8 %

## 2021-11-29 ENCOUNTER — OFFICE VISIT (OUTPATIENT)
Dept: NEUROLOGY | Age: 76
End: 2021-11-29
Payer: MEDICARE

## 2021-11-29 VITALS
DIASTOLIC BLOOD PRESSURE: 82 MMHG | WEIGHT: 174.4 LBS | SYSTOLIC BLOOD PRESSURE: 140 MMHG | BODY MASS INDEX: 30.89 KG/M2 | OXYGEN SATURATION: 93 % | TEMPERATURE: 97.4 F | HEART RATE: 71 BPM

## 2021-11-29 DIAGNOSIS — G25.0 ESSENTIAL TREMOR: ICD-10-CM

## 2021-11-29 DIAGNOSIS — F41.9 ANXIETY WITH SOMATIZATION: ICD-10-CM

## 2021-11-29 DIAGNOSIS — M47.16 LUMBAR SPONDYLOSIS WITH MYELOPATHY: ICD-10-CM

## 2021-11-29 DIAGNOSIS — G30.1 LATE ONSET ALZHEIMER'S DEMENTIA WITH BEHAVIORAL DISTURBANCE (HCC): Primary | ICD-10-CM

## 2021-11-29 DIAGNOSIS — F45.0 ANXIETY WITH SOMATIZATION: ICD-10-CM

## 2021-11-29 DIAGNOSIS — R55 CONVULSIVE SYNCOPE: ICD-10-CM

## 2021-11-29 DIAGNOSIS — F02.818 LATE ONSET ALZHEIMER'S DEMENTIA WITH BEHAVIORAL DISTURBANCE (HCC): Primary | ICD-10-CM

## 2021-11-29 DIAGNOSIS — S06.0X1A CONCUSSION WITH LOSS OF CONSCIOUSNESS OF 30 MINUTES OR LESS, INITIAL ENCOUNTER: ICD-10-CM

## 2021-11-29 DIAGNOSIS — R26.81 GAIT INSTABILITY: ICD-10-CM

## 2021-11-29 DIAGNOSIS — F32.1 MODERATE MAJOR DEPRESSION (HCC): ICD-10-CM

## 2021-11-29 DIAGNOSIS — F80.81 STUTTERING: ICD-10-CM

## 2021-11-29 PROBLEM — F02.80 LATE ONSET ALZHEIMER'S DISEASE WITHOUT BEHAVIORAL DISTURBANCE (HCC): Status: ACTIVE | Noted: 2021-11-29

## 2021-11-29 PROCEDURE — G8427 DOCREV CUR MEDS BY ELIG CLIN: HCPCS | Performed by: PSYCHIATRY & NEUROLOGY

## 2021-11-29 PROCEDURE — G8417 CALC BMI ABV UP PARAM F/U: HCPCS | Performed by: PSYCHIATRY & NEUROLOGY

## 2021-11-29 PROCEDURE — 1101F PT FALLS ASSESS-DOCD LE1/YR: CPT | Performed by: PSYCHIATRY & NEUROLOGY

## 2021-11-29 PROCEDURE — G8400 PT W/DXA NO RESULTS DOC: HCPCS | Performed by: PSYCHIATRY & NEUROLOGY

## 2021-11-29 PROCEDURE — G8536 NO DOC ELDER MAL SCRN: HCPCS | Performed by: PSYCHIATRY & NEUROLOGY

## 2021-11-29 PROCEDURE — 99215 OFFICE O/P EST HI 40 MIN: CPT | Performed by: PSYCHIATRY & NEUROLOGY

## 2021-11-29 PROCEDURE — 1090F PRES/ABSN URINE INCON ASSESS: CPT | Performed by: PSYCHIATRY & NEUROLOGY

## 2021-11-29 PROCEDURE — G9717 DOC PT DX DEP/BP F/U NT REQ: HCPCS | Performed by: PSYCHIATRY & NEUROLOGY

## 2021-11-29 NOTE — PROGRESS NOTES
Chief Complaint   Patient presents with    Follow-up     Follow up rizwana; Pricila Simpson and has trip tp [facility] ([place] on Tuesday     Visit Vitals  BP (!) 140/82 (BP 1 Location: Left arm, BP Patient Position: Sitting, BP Cuff Size: Adult)   Pulse 71   Temp 97.4 °F (36.3 °C) (Temporal)   Wt 79.1 kg (174 lb 6.4 oz)   SpO2 93%   BMI 30.89 kg/m²

## 2021-11-29 NOTE — PROGRESS NOTES
NEUROLOGY CLINIC NOTE    Patient ID:  Lani Solorzano  [id number]  62 y.o.  1945    Date of Visit:  November 29, 2021    Referring Physician: Dr. Marvin Spears    Reason for Visit:  Dementia, tremors and stuttering    Chief Complaint   Patient presents with    Follow-up     Follow up rizwana; Neli Cornell and has trip tp [facility] ([place]) on Tuesday       History of Present Illness:     Patient Active Problem List    Diagnosis Date Noted    BERTRAM on CPAP     Type II or unspecified type diabetes mellitus without mention of complication, not stated as uncontrolled 11/24/2014    Hypothyroid 10/28/2014    HLD (hyperlipidemia) 10/28/2014    Depression 10/28/2014    Anxiety 10/28/2014    Incontinence of urine in female 10/28/2014    OA (osteoarthritis) 10/28/2014     Past Medical History:   Diagnosis Date    Depression     Hypercholesterolemia     BERTRAM on CPAP     Dr. Alesha Ray     Thyroid disease     Urinary incontinence       Past Surgical History:   Procedure Laterality Date    HX COLONOSCOPY      HX ENDOSCOPY      HX GYN      HX HEENT      HX ORTHOPAEDIC      WI ABDOMEN SURGERY PROC UNLISTED        Prior to Admission medications    Medication Sig Start Date End Date Taking? Authorizing Provider   atorvastatin (LIPITOR) 80 mg tablet Take 1 Tab by mouth daily. 1/28/21  Yes Darren Perez MD   venlafaxine-SR [facility] 150 mg capsule Take 1 capsule by mouth daily. 1/28/21  Yes Darren Perez MD   buPROPion XL (WELLBUTRIN XL) 150 mg tablet Take 1 tablet by mouth daily 1/28/21  Yes Darren Perez MD   fenofibric acid [facility]) 135 mg capsule Take 1 capsule by mouth daily. 1/28/21  Yes Darren Perez MD   venlafaxine-SR [facility] 75 mg capsule Take 1 capsule by mouth daily. 1/28/21  Yes Darren Perez MD   levothyroxine (SYNTHROID) 75 mcg tablet Take 1 Tab by mouth Daily (before breakfast).  1/28/21  Yes Darren Perez MD   celecoxib (CELEBREX) 200 mg capsule Take 1 Cap by mouth daily. Indications: joint damage causing pain and loss of function 1/28/21  Yes Shi Bustos MD   omeprazole (PRILOSEC) 40 mg capsule Take 1 Cap by mouth daily. 1/28/21  Yes Shi Bustos MD   SITagliptin (Januvia) 25 mg tablet TAKE ONE TABLET BY MOUTH DAILY 1/28/21  Yes Shi Bustos MD   busPIRone (BUSPAR) 10 mg tablet Take 1 Tab by mouth two (2) times a day. 1/28/21  Yes Shi Bustos MD   donepeziL (ARICEPT) 5 mg tablet Take 1 tablet by mouth nightly for mild to moderate Alzheimer's type Dementia. 12/18/20  Yes Shi Bustos MD     Allergies   Allergen Reactions    Gabapentin Drowsiness    Deserpidine Other (comments)     Puts pressure around her head like a ring      Social History     Tobacco Use    Smoking status: Never Smoker    Smokeless tobacco: Never Used   Substance Use Topics    Alcohol use: No      Family History   Problem Relation Age of Onset    No Known Problems Mother     No Known Problems Father         Subjective: Estelle Pleitez is a 64 y.o. GYDC with history of dementia, lumbar spondylosis, DM, B12 deficiency, anxiety, depression, BERTRAM, hypercholesterolemia and thyroid disorder who was referred here by Dr. Melissa Matthews for further evaluation of her dementia, tremors and speech difficulties. Patient is here for follow up. Review of records revealed:  Patient was last seen by her PCP 1/28/2021 and note mentions patient having increase issues with falling at least twice a day. Feels unbalanced and lightheaded. Holds onto the walls to help her with walking. Complains of left greater than right foot paresthesias. Exam revealed bilateral mild upper extremity tremors and unsteady gait. Note mentions patient previously seen by neurology in [place] with history of B12 deficiency that has been supplemented. Referred here for further evaluation. Labs done revealed normal free T4 and TSH.   Labs done 12/21/2020 revealed slightly elevated hemoglobin A1c at 6.6, elevated TSH at 6.89 with normal free T4, elevated B12, normal lipid panel and CMP revealing increased blood sugar. Head CT without contrast done 6/8/2020 compared to an MRI done 5/15/2020 revealed no acute intracranial abnormality. Lumbar spine MRI without contrast done 12/29/2017 revealed L2-3 mild spinal canal stenosis. L3-4 moderate to severe spinal canal stenosis. L4-5 pronounced spinal canal stenosis with left neuroforaminal narrowing. L5-S1 mild spinal canal stenosis with left foraminal narrowing. Neuropsychological testing done 12/10/2019 revealed mild to moderate dementia exacerbated by depression, anxiety, stress and related physical concerns. Recommended medication for memory, attention, depression and anxiety. Psychiatry consult was recommended. Supportive counseling was advised. Patient was not found competent to make informed medical decision, financial decisions, devote, to  or to own a firearm. Power of  was recommended. Diagnosed with moderate major depression, anxiety with somatization and passive suicidal ideation. Patient was started on donepezil 5 mg daily by her PCP on a clinic visit 1/6/2020. Patient was unaccompanied to her visit initially. She reports no major changes to her issue. She still has short term memory lapses but able to do activities of daily living (bathing, personal hygiene, choosing her clothes, eating, sleeping and etc.) well. Son concurs. Patient has been having mild hand tremors for the past several months that does not affect her activities of daily living. She does not spill her food or drink. Seems to be worse when she is more anxious. Patient also has intermittent stuttering of her speech. Patient has issues with gait stability but prefers using a single cane. She has had episodes of falling. She does feel wobbly and unsteady. She does have numbness in her feet.   She does have a walker at home which she does not use routinely. She continues to take donepezil 5 mg daily. Denies any side effects. She does have a history of lumbar spondylosis and was seen by Dr. Tiff Thomas (orthopedics). Note mentions discussion about nonoperative and operative interventions. Recommended physical therapy and reevaluation in 6 months. Since the last visit on 3/11/2021, patient was able to tolerate Donepezil 5 mg daily. No side effects. Then apparently last 11/23/2021, patient was found on the floor in her room. Face on the floor and was shaking and unresponsive. EMS was called and when they put her on the stretcher she became coherent. Patient was brought to [facility] ER. Son mentions having a head CT done and laboratory workup which was reported to be negative. Discharged the next day. Since then patient has been complaining of dizzy spells and what sounds like behavioral issues (paranoia, bizarre behavior and confusion) that starts in the afternoon and evening. Talking to her daughter like she was a child. Review of records revealed:  Seen by new PCP, Dr. Mandi Patel done 5/27/2021: Unremarkable CBC, CMP, lipid panel, free T4, B12 and folate. Elevated osmolality, TSH (5.47) and hgbA1c (6.4)  Labs done 9/30/2021: highly elevated TSH at 18.8, elevated hgbA1c at 6.8  Unremarkable free T4 and vitamin D level.     Outside reports reviewed: labs    Review of Systems:    A comprehensive review of systems was performed:   Constitutional: positive for none  Eyes: positive for none  Ears, nose, mouth, throat, and face: positive for none  Respiratory: positive for none  Cardiovascular: positive for none  Gastrointestinal: positive for none  Genitourinary: positive for none  Integument/breast: positive for none  Hematologic/lymphatic: positive for none  Musculoskeletal: positive for back pain  Neurological: positive for tremors, memory loss, stuttering  Behavioral/Psych: positive for none  Endocrine: positive for none  Allergic/Immunologic: positive for none      Objective:     Visit Vitals  BP (!) 140/82 (BP 1 Location: Left arm, BP Patient Position: Sitting, BP Cuff Size: Adult)   Pulse 71   Temp 97.4 °F (36.3 °C) (Temporal)   Wt 79.1 kg (174 lb 6.4 oz)   SpO2 93%   BMI 30.89 kg/m²     PHYSICAL EXAM:    NEUROLOGICAL EXAM:    Appearance: The patient is well developed, well nourished, provides a coherent history and is in no acute distress. Mental Status: Oriented to place and person. Fluent, no aphasia or dysarthria. Poor memory. Mood and affect appropriate. Cranial Nerves:   II - XII were intact. Motor:  5/5 strength in upper and lower proximal and distal muscles. Normal bulk and tone. No cogwheel rigidity. No pronator drift. Reflexes:   Deep tendon reflexes 1+/4 and symmetrical. Downgoing toes. Sensory:   Normal to cold. Gait:  Wide based and unsteady gait. No Romberg. Tremor:   Intermittent head and arm/hand tremors both postural and intentional. No resting pill rolling tremors. Cerebellar:  Intact FTN/JAI/HTS. No bradykinesia. Assessment:   Dementia  Concussion  Convulsive syncope  Lumbar spondylosis with myelopathy  Stuttering  Essential tremors  Gait instability  Major depression  Anxiety with somatization    Plan:   Neurological examination does not reveal any focal findings. Cognitive testing was done and patient scored 22/30. Problems with time and place orientation, immediate recall, copying a design. Worsening likely due to recent concussion and fall. Labs done revealed hypothyroidism. May also be a factor. Previous neuropsychological testing revealed mild to moderate dementia. This is compounded by significant psychiatric issues. Continue donepezil 5 mg daily for now. I need to obtain a copy of her ER visit. Recommend continued supervision. Patient is advised to do routine structured physical and mental exercises.  If behavior issues continue to persist then will treat accordingly. Patient status post fall and hitting her face. Likely developing concussion given the current symptoms. Need to obtain copy of her records from the ER visit especially neuroimaging done. Event sounds like syncope followed by convulsion. EEG was ordered to assess for possible seizures. Patient with history of lumbar spondylosis with myelopathy worse at L3-L5. Likely cause of her issue with gait instability and falling. Advised to continue exercises taught by physical therapy for strengthening and balance improvement. Advised again to use a walker instead of a cane for stability. Continue fall precautions. Stuttering intermittently seen which is more of a regression of his speech pattern typically seen in psych issues. No intervention necessary at this time. Exam reveals postural and intentional upper extremity and head tremors. Findings typically seen in essential tremors. No findings typically seen in Parkinson's disease. Currently does not affect her activities of daily living. Potential trial of medications if it does. Monitor for now. Patient having issues with gait that likely is due to her lumbar disc disease. Advised to use all necessary equipment. Neuropsychological testing revealed issues with major depression and anxiety with somatization. Stuttering is likely part of the somatization. Recommend continued aggressive care with medication, follow-up with psychiatry and counseling. All questions and concerns were answered. Visit lasted 40 minutes. >50% was spent discussing her condition, potential etiology, prognosis, need to obtain medical records for review what sounds like a syncopal spell followed by convulsion, EEG to assess for seizure, postconcussion syndrome, treatment options, medication    This note was created using voice recognition software. Despite editing, there may be syntax errors.      Received [facility] from [facility]:  Patient was seen 11/24/2021 and discharged from the ER the same day. Patient had an unwitnessed fall and became unresponsive with generalized twitching motions for several minutes. When EMS arrived patient was very somnolent and minimally responsive. After 10 minutes patient snapped back and became interactive. In the ER blood pressure was 156/86. Laboratory work-up revealed unremarkable CBC, venous gas, CMP, free T4, lactic acid, magnesium and urinalysis. Serum osmolality was elevated at 303, elevated TSH at 6.41, slightly elevated CK at 229. Head CT without contrast did not reveal any acute process. Cerebral atrophy. Deep white matter disease. Cervical spine CT revealed no acute cervical spine fracture or dislocation. Multilevel bilateral neuroforaminal stenosis. Cervical spondylosis and degenerative disc disease. X-ray of the pelvis revealed no fracture or dislocation. Chest x-ray was unremarkable.

## 2021-11-30 ENCOUNTER — OFFICE VISIT (OUTPATIENT)
Dept: NEUROLOGY | Age: 76
End: 2021-11-30
Payer: MEDICARE

## 2021-11-30 DIAGNOSIS — R55 CONVULSIVE SYNCOPE: Primary | ICD-10-CM

## 2021-11-30 PROCEDURE — 95816 EEG AWAKE AND DROWSY: CPT | Performed by: PSYCHIATRY & NEUROLOGY

## 2021-12-01 NOTE — PROCEDURES
[address] [place]     Electroencephalogram Report    Procedure ID: [facility]  Procedure Date: 11/30/2021   Patient Name: Isabella Rasmussen YOB: 1945   Procedure Type: Routine Medical Record No: [id number]     INDICATION:  convulsive syncope     STATE: Awake and drowsy . DESCRIPTION OF PROCEDURE: Electrodes were applied in accordance with the international 10-20 system of electrode placement. EEG was reviewed in both bipolar and referential montages. Description of Activity:  During wakefulness, there is continuous runs of 9 Hz symmetric posterior alpha rhythm, that attenuate symmetrically with eye opening. Low voltage beta activity occurs symmetrically at the anterior head regions bilaterally. During drowsiness, there is attenuation of the alpha rhythm and low voltage theta activity occurs bilaterally. Short intermittent photic stimulation was performed and did not induce posterior driving responses. No sharp or spike discharges, seizures or epileptiform discharges seen. No focal asymmetry. Clinical Interpretation: This EEG, performed during wakefulness and drowsiness is normal. There is no focal asymmetry, seizures or epileptiform discharges seen. Medications:  Current Outpatient Medications   Medication Sig    atorvastatin (LIPITOR) 80 mg tablet Take 1 Tab by mouth daily.  venlafaxine-SR (EFFEXOR-XR) 150 mg capsule Take 1 capsule by mouth daily.  buPROPion XL (WELLBUTRIN XL) 150 mg tablet Take 1 tablet by mouth daily    fenofibric acid (TRILIPIX ER) 135 mg capsule Take 1 capsule by mouth daily.  venlafaxine-SR (EFFEXOR-XR) 75 mg capsule Take 1 capsule by mouth daily.  levothyroxine (SYNTHROID) 75 mcg tablet Take 1 Tab by mouth Daily (before breakfast).  celecoxib (CELEBREX) 200 mg capsule Take 1 Cap by mouth daily.  Indications: joint damage causing pain and loss of function    omeprazole (PRILOSEC) 40 mg capsule Take 1 Cap by mouth daily.  SITagliptin (Januvia) 25 mg tablet TAKE ONE TABLET BY MOUTH DAILY    busPIRone (BUSPAR) 10 mg tablet Take 1 Tab by mouth two (2) times a day.  donepeziL (ARICEPT) 5 mg tablet Take 1 tablet by mouth nightly for mild to moderate Alzheimer's type Dementia. No current facility-administered medications for this visit.

## 2022-03-20 PROBLEM — G30.1 LATE ONSET ALZHEIMER'S DISEASE WITHOUT BEHAVIORAL DISTURBANCE (HCC): Status: ACTIVE | Noted: 2021-11-29

## 2022-03-20 PROBLEM — F02.80 LATE ONSET ALZHEIMER'S DISEASE WITHOUT BEHAVIORAL DISTURBANCE (HCC): Status: ACTIVE | Noted: 2021-11-29

## 2022-05-08 ENCOUNTER — APPOINTMENT (OUTPATIENT)
Dept: VASCULAR SURGERY | Age: 77
End: 2022-05-08
Attending: HOSPITALIST
Payer: MEDICARE

## 2022-05-08 ENCOUNTER — HOSPITAL ENCOUNTER (OUTPATIENT)
Age: 77
Setting detail: OBSERVATION
Discharge: HOME | End: 2022-05-11
Attending: STUDENT IN AN ORGANIZED HEALTH CARE EDUCATION/TRAINING PROGRAM | Admitting: HOSPITALIST
Payer: MEDICARE

## 2022-05-08 ENCOUNTER — APPOINTMENT (OUTPATIENT)
Dept: CT IMAGING | Age: 77
End: 2022-05-08
Attending: STUDENT IN AN ORGANIZED HEALTH CARE EDUCATION/TRAINING PROGRAM
Payer: MEDICARE

## 2022-05-08 DIAGNOSIS — F02.80 LATE ONSET ALZHEIMER'S DISEASE WITHOUT BEHAVIORAL DISTURBANCE (HCC): ICD-10-CM

## 2022-05-08 DIAGNOSIS — G30.1 LATE ONSET ALZHEIMER'S DISEASE WITHOUT BEHAVIORAL DISTURBANCE (HCC): ICD-10-CM

## 2022-05-08 DIAGNOSIS — R55 RECURRENT SYNCOPE: Primary | ICD-10-CM

## 2022-05-08 DIAGNOSIS — R55 SYNCOPE AND COLLAPSE: ICD-10-CM

## 2022-05-08 LAB
ALBUMIN SERPL-MCNC: 3.6 G/DL (ref 3.5–5)
ALBUMIN/GLOB SERPL: 1.1 {RATIO} (ref 1.1–2.2)
ALP SERPL-CCNC: 67 U/L (ref 45–117)
ALT SERPL-CCNC: 27 U/L (ref 12–78)
ANION GAP BLD CALC-SCNC: 11 MMOL/L (ref 10–20)
ANION GAP SERPL CALC-SCNC: 5 MMOL/L (ref 5–15)
AST SERPL-CCNC: 32 U/L (ref 15–37)
ATRIAL RATE: 62 BPM
BASOPHILS # BLD: 0 K/UL (ref 0–0.1)
BASOPHILS NFR BLD: 1 % (ref 0–1)
BILIRUB SERPL-MCNC: 0.6 MG/DL (ref 0.2–1)
BUN SERPL-MCNC: 15 MG/DL (ref 6–20)
BUN/CREAT SERPL: 14 (ref 12–20)
CA-I BLD-MCNC: 1.22 MMOL/L (ref 1.12–1.32)
CALCIUM SERPL-MCNC: 9 MG/DL (ref 8.5–10.1)
CALCULATED P AXIS, ECG09: 13 DEGREES
CALCULATED R AXIS, ECG10: -18 DEGREES
CALCULATED T AXIS, ECG11: 38 DEGREES
CHLORIDE BLD-SCNC: 105 MMOL/L (ref 98–107)
CHLORIDE SERPL-SCNC: 108 MMOL/L (ref 97–108)
CO2 BLD-SCNC: 27.9 MMOL/L (ref 21–32)
CO2 SERPL-SCNC: 27 MMOL/L (ref 21–32)
CREAT BLD-MCNC: 0.82 MG/DL (ref 0.6–1.3)
CREAT SERPL-MCNC: 1.04 MG/DL (ref 0.55–1.02)
DIAGNOSIS, 93000: NORMAL
DIFFERENTIAL METHOD BLD: ABNORMAL
EOSINOPHIL # BLD: 0.1 K/UL (ref 0–0.4)
EOSINOPHIL NFR BLD: 1 % (ref 0–7)
ERYTHROCYTE [DISTWIDTH] IN BLOOD BY AUTOMATED COUNT: 15.4 % (ref 11.5–14.5)
GLOBULIN SER CALC-MCNC: 3.2 G/DL (ref 2–4)
GLUCOSE BLD STRIP.AUTO-MCNC: 187 MG/DL (ref 65–117)
GLUCOSE BLD-MCNC: 100 MG/DL (ref 65–100)
GLUCOSE SERPL-MCNC: 97 MG/DL (ref 65–100)
HCT VFR BLD AUTO: 38 % (ref 35–47)
HGB BLD-MCNC: 12 G/DL (ref 11.5–16)
IMM GRANULOCYTES # BLD AUTO: 0 K/UL (ref 0–0.04)
IMM GRANULOCYTES NFR BLD AUTO: 1 % (ref 0–0.5)
INR PPP: 1.1 (ref 0.9–1.1)
LYMPHOCYTES # BLD: 0.9 K/UL (ref 0.8–3.5)
LYMPHOCYTES NFR BLD: 18 % (ref 12–49)
MCH RBC QN AUTO: 28.2 PG (ref 26–34)
MCHC RBC AUTO-ENTMCNC: 31.6 G/DL (ref 30–36.5)
MCV RBC AUTO: 89.4 FL (ref 80–99)
MONOCYTES # BLD: 0.5 K/UL (ref 0–1)
MONOCYTES NFR BLD: 9 % (ref 5–13)
NEUTS SEG # BLD: 3.7 K/UL (ref 1.8–8)
NEUTS SEG NFR BLD: 70 % (ref 32–75)
NRBC # BLD: 0 K/UL (ref 0–0.01)
NRBC BLD-RTO: 0 PER 100 WBC
P-R INTERVAL, ECG05: 154 MS
PLATELET # BLD AUTO: 248 K/UL (ref 150–400)
PMV BLD AUTO: 9.7 FL (ref 8.9–12.9)
POTASSIUM BLD-SCNC: 4.2 MMOL/L (ref 3.5–5.1)
POTASSIUM SERPL-SCNC: 4.1 MMOL/L (ref 3.5–5.1)
PROT SERPL-MCNC: 6.8 G/DL (ref 6.4–8.2)
PROTHROMBIN TIME: 11.4 SEC (ref 9–11.1)
Q-T INTERVAL, ECG07: 416 MS
QRS DURATION, ECG06: 86 MS
QTC CALCULATION (BEZET), ECG08: 422 MS
RBC # BLD AUTO: 4.25 M/UL (ref 3.8–5.2)
SERVICE CMNT-IMP: ABNORMAL
SERVICE CMNT-IMP: NORMAL
SODIUM BLD-SCNC: 143 MMOL/L (ref 136–145)
SODIUM SERPL-SCNC: 140 MMOL/L (ref 136–145)
TROPONIN-HIGH SENSITIVITY: 5 NG/L (ref 0–51)
TSH SERPL DL<=0.05 MIU/L-ACNC: 2.12 UIU/ML (ref 0.36–3.74)
VENTRICULAR RATE, ECG03: 62 BPM
WBC # BLD AUTO: 5.3 K/UL (ref 3.6–11)

## 2022-05-08 PROCEDURE — 93005 ELECTROCARDIOGRAM TRACING: CPT

## 2022-05-08 PROCEDURE — 74011000636 HC RX REV CODE- 636: Performed by: STUDENT IN AN ORGANIZED HEALTH CARE EDUCATION/TRAINING PROGRAM

## 2022-05-08 PROCEDURE — 96375 TX/PRO/DX INJ NEW DRUG ADDON: CPT

## 2022-05-08 PROCEDURE — G0378 HOSPITAL OBSERVATION PER HR: HCPCS

## 2022-05-08 PROCEDURE — 84484 ASSAY OF TROPONIN QUANT: CPT

## 2022-05-08 PROCEDURE — 70450 CT HEAD/BRAIN W/O DYE: CPT

## 2022-05-08 PROCEDURE — 96376 TX/PRO/DX INJ SAME DRUG ADON: CPT

## 2022-05-08 PROCEDURE — 93880 EXTRACRANIAL BILAT STUDY: CPT

## 2022-05-08 PROCEDURE — 96361 HYDRATE IV INFUSION ADD-ON: CPT

## 2022-05-08 PROCEDURE — 82962 GLUCOSE BLOOD TEST: CPT

## 2022-05-08 PROCEDURE — 85610 PROTHROMBIN TIME: CPT

## 2022-05-08 PROCEDURE — 70496 CT ANGIOGRAPHY HEAD: CPT

## 2022-05-08 PROCEDURE — 96374 THER/PROPH/DIAG INJ IV PUSH: CPT

## 2022-05-08 PROCEDURE — 74011250636 HC RX REV CODE- 250/636: Performed by: STUDENT IN AN ORGANIZED HEALTH CARE EDUCATION/TRAINING PROGRAM

## 2022-05-08 PROCEDURE — 99285 EMERGENCY DEPT VISIT HI MDM: CPT

## 2022-05-08 PROCEDURE — 84443 ASSAY THYROID STIM HORMONE: CPT

## 2022-05-08 PROCEDURE — 85025 COMPLETE CBC W/AUTO DIFF WBC: CPT

## 2022-05-08 PROCEDURE — 95816 EEG AWAKE AND DROWSY: CPT | Performed by: HOSPITALIST

## 2022-05-08 PROCEDURE — 80047 BASIC METABLC PNL IONIZED CA: CPT

## 2022-05-08 PROCEDURE — 36415 COLL VENOUS BLD VENIPUNCTURE: CPT

## 2022-05-08 PROCEDURE — 80053 COMPREHEN METABOLIC PANEL: CPT

## 2022-05-08 RX ORDER — ATORVASTATIN CALCIUM 20 MG/1
80 TABLET, FILM COATED ORAL DAILY
Status: DISCONTINUED | OUTPATIENT
Start: 2022-05-09 | End: 2022-05-11 | Stop reason: HOSPADM

## 2022-05-08 RX ORDER — CHOLECALCIFEROL (VITAMIN D3) 125 MCG
10 CAPSULE ORAL
COMMUNITY

## 2022-05-08 RX ORDER — BUSPIRONE HYDROCHLORIDE 10 MG/1
10 TABLET ORAL DAILY
COMMUNITY

## 2022-05-08 RX ORDER — LEVOTHYROXINE SODIUM 75 UG/1
75 TABLET ORAL
Status: DISCONTINUED | OUTPATIENT
Start: 2022-05-09 | End: 2022-05-11 | Stop reason: HOSPADM

## 2022-05-08 RX ORDER — ONDANSETRON 2 MG/ML
4 INJECTION INTRAMUSCULAR; INTRAVENOUS
Status: COMPLETED | OUTPATIENT
Start: 2022-05-08 | End: 2022-05-08

## 2022-05-08 RX ORDER — BUSPIRONE HYDROCHLORIDE 10 MG/1
10 TABLET ORAL DAILY
Status: DISCONTINUED | OUTPATIENT
Start: 2022-05-09 | End: 2022-05-11 | Stop reason: HOSPADM

## 2022-05-08 RX ORDER — DONEPEZIL HYDROCHLORIDE 5 MG/1
5 TABLET, FILM COATED ORAL DAILY
Status: DISCONTINUED | OUTPATIENT
Start: 2022-05-09 | End: 2022-05-08

## 2022-05-08 RX ORDER — PANTOPRAZOLE SODIUM 40 MG/1
40 TABLET, DELAYED RELEASE ORAL
Status: DISCONTINUED | OUTPATIENT
Start: 2022-05-09 | End: 2022-05-11 | Stop reason: HOSPADM

## 2022-05-08 RX ORDER — VENLAFAXINE HYDROCHLORIDE 150 MG/1
150 CAPSULE, EXTENDED RELEASE ORAL
Status: DISCONTINUED | OUTPATIENT
Start: 2022-05-09 | End: 2022-05-11 | Stop reason: HOSPADM

## 2022-05-08 RX ORDER — LANOLIN ALCOHOL/MO/W.PET/CERES
3 CREAM (GRAM) TOPICAL
Status: DISCONTINUED | OUTPATIENT
Start: 2022-05-08 | End: 2022-05-11 | Stop reason: HOSPADM

## 2022-05-08 RX ORDER — KETOROLAC TROMETHAMINE 30 MG/ML
15 INJECTION, SOLUTION INTRAMUSCULAR; INTRAVENOUS
Status: ACTIVE | OUTPATIENT
Start: 2022-05-08 | End: 2022-05-09

## 2022-05-08 RX ORDER — BUPROPION HYDROCHLORIDE 150 MG/1
150 TABLET ORAL DAILY
Status: DISCONTINUED | OUTPATIENT
Start: 2022-05-09 | End: 2022-05-11 | Stop reason: HOSPADM

## 2022-05-08 RX ORDER — ACETAMINOPHEN 325 MG/1
650 TABLET ORAL
COMMUNITY

## 2022-05-08 RX ORDER — FENOFIBRATE 48 MG/1
48 TABLET, COATED ORAL DAILY
Status: DISCONTINUED | OUTPATIENT
Start: 2022-05-09 | End: 2022-05-11 | Stop reason: HOSPADM

## 2022-05-08 RX ADMIN — IOPAMIDOL 100 ML: 755 INJECTION, SOLUTION INTRAVENOUS at 13:20

## 2022-05-08 RX ADMIN — ONDANSETRON 4 MG: 2 INJECTION INTRAMUSCULAR; INTRAVENOUS at 12:51

## 2022-05-08 RX ADMIN — ONDANSETRON 4 MG: 2 INJECTION INTRAMUSCULAR; INTRAVENOUS at 13:35

## 2022-05-08 RX ADMIN — SODIUM CHLORIDE 1000 ML: 9 INJECTION, SOLUTION INTRAVENOUS at 13:34

## 2022-05-08 NOTE — H&P
[facility] [place] [address]  HISTORY AND PHYSICAL    Name:  Faustino Harman  MR#:  [id number]  :  1945  ACCOUNT #:  [de-identified]  ADMIT DATE:  2022      PRIMARY CARE PHYSICIAN:  Dr. Carlos Sexton. PRESENTING COMPLAINT:  Syncope. HISTORY OF PRESENTING ILLNESS:      The patient is a 64-year-old female, who currently resides at [address]. The patient has history of dementia. She was brought in after having a syncopal episode. I spoke with her son over the phone, who tells me this is the fifth time she had  syncopal episode in the last 6 months. According to her son, she had EEG done in November of last year , which did not show any seizure. In the emergency room, CT scan of the head was done which showed no acute findings. CTA also was unremarkable. Her son tells me that these episodes  happens when she is in a standing position. However per ER physician today she was sitting down when she became unresponsive for 5 minutes then woke up on her own She is not able to give me much history, but at this time she is completely asymptomatic. PAST MEDICAL HISTORY:     1. Dementia. 2.  History of essential tremors. 3.  History of lumbar spondylosis with myelopathy. 4.  Major depression. 5.  Anxiety. SOCIOECONOMIC HISTORY:      The patient does not smoke or drink. She lives at [address]. Code status is do not resuscitate. FAMILY HISTORY:  Not available. CURRENT MEDICATIONS:  Include;    1. Lipitor 80 mg daily. 2.  Bupropion 150 mg every day. 3.  Donepezil 5 mg daily. 4.  Fenofibric acid 135 mg daily. 5.  Januvia 25 mg daily. 6.  Levothyroxine 75 mcg daily. 7.  Omeprazole 40 mg daily. 8.  Venlafaxine 150 mg daily. CODE STATUS:      Do not resuscitate. PHYSICAL EXAMINATION:    GENERAL:  The patient is a 64-year-old female, who is not in any acute distress.   VITAL SIGNS:  Reveals temperature of 97.9, blood pressure is 147/65, pulse is 60, respiratory rate is 92, saturation 100%. HEENT:  Reveals pupils equally reactive to light and accommodation. NECK:  Neck is supple. There is no lymphadenopathy or JVD. CHEST:  Chest is clear. No wheezing or crackles. CARDIOVASCULAR:  S1 and S2 regular. No murmur. No S3. Abdominal examination reveals no tenderness. No guarding or rigidity. Bowel sounds are active. EXTREMITIES:  No pedal edema. CNS:  Reveals the patient is alert and oriented x2. She has no significant weakness. Sensory examination is unremarkable. Speech is normal.  Plantars downgoing. Reflexes are equal.    LABORATORY DATA:  Labs reveal CBC with a white count of 5.3, hemoglobin of 12, hematocrit of 38, platelet count is 357,339. Sodium 140, potassium 4.1, chloride 108, bicarb is 27, gap of 5, glucose 97, BUN is 15, creatinine is 1.04. Bilirubin 0.6, protein 6.8, albumin is 3.6, globulin is 3.2. AST 32, ALT 27. Troponin I is 5. INR is 1.1.  TSH is 2.12. EKG shows normal sinus rhythm, inferior infarct age unknown. CT head with no acute finding. CTA of head and neck with contrast shows no significant findings. ASSESSMENT/PLAN:      The patient is a 77-year-old female, who has history of dementia, essential tremors, gait instability, anxiety and depression, is admitted with;    1.  Recurrent syncope: The patient will be admitted. We will check orthostasis. It looks like she had a previous EEG which was unremarkable. We will consult Neuro and obtain an EEG. Prolactin level ordered. 2.  History of anxiety and depression:  Patient's son states when she gets anxious, she starts stuttering. 3.  Intentional tremor:  No history of Parkinson's. 4.  Due to frequent syncope, I will get an echocardiogram and check her orthostatics. CTA un remarkable I have ordered Carotid doppler. 5.  Consult Neurology. 6. Mild Bradycardia: Not sure if this is causing her Syncope.  I will hold Aricept as it can cause Bradycardia and monitor heart rate on telemetry.       Meggan Lazaro MD      [id number]  D:  05/08/2022 15:03  T:  05/08/2022 15:36  JOB #:  [id number]

## 2022-05-08 NOTE — ED TRIAGE NOTES
Patient arrives via EMS from [facility] with a chief complaint of a witnessed syncopal episode. Per nursing facility, patient was unresponsive for a few minutes. Patient reports posterior headaches upon waking this morning. PMH of dementia, HTN, CKD.

## 2022-05-08 NOTE — PROGRESS NOTES
TRANSFER - IN REPORT:    Verbal report received from Ashlie Knight RN(name) on Trevor Gaxiola  being received from ED(unit) for routine progression of care      Report consisted of patients Situation, Background, Assessment and   Recommendations(SBAR). Information from the following report(s) SBAR, Kardex, ED Summary, OR Summary, Procedure Summary, Intake/Output, MAR, Accordion, Recent Results and Med Rec Status was reviewed with the receiving nurse. Opportunity for questions and clarification was provided. Assessment completed upon patients arrival to unit and care assumed. Bedside and Verbal shift change report given to RN (oncoming nurse) by Wilner Kay RN (offgoing nurse). Report included the following information SBAR, Kardex, ED Summary, OR Summary, Procedure Summary, Intake/Output, MAR, Accordion, Recent Results and Med Rec Status.

## 2022-05-08 NOTE — ED PROVIDER NOTES
Patient is a pleasant 73-year-old female with a history of neurocognitive disorder presenting from a skilled nursing facility after syncopal episode. Per EMS patient was sitting in her chair when she passed out. Staff had to sternal rub her to get her to come around she was out for maybe 3 to 5 minutes. No falls no trauma. NIH is 0, no neurodeficits. Patient is endorsing a headache that has been present since prior to the syncopal episode. Patient states that she does have headaches at times. Patient mild nausea but no vomiting. No signs of stroke or TIA at this time or indication to do a stroke activation. However will order CT head as well as CTA head and neck to look for dural venous thrombosis or other cause of patient's acute headache. No hypoglycemia. The history is provided by the patient, medical records and the EMS personnel. Syncope   This is a new problem. The current episode started 1 to 2 hours ago. The problem occurs constantly. The problem has not changed since onset. She lost consciousness for a period of 1 to 5 minutes. The problem is associated with normal activity. Associated symptoms include headaches and back pain. Pertinent negatives include no visual change, no chest pain, no palpitations, no confusion, no malaise/fatigue, no bowel incontinence, no bladder incontinence and no congestion. Treatments tried: Stimulation, sternal rub. The treatment provided significant relief. Her past medical history is significant for syncope. Her past medical history does not include no CVA or no TIA.         Past Medical History:   Diagnosis Date    Depression     Hypercholesterolemia     BERTRAM on CPAP     Dr. Addison Jacobsen     Thyroid disease     Urinary incontinence        Past Surgical History:   Procedure Laterality Date    HX COLONOSCOPY      HX ENDOSCOPY      HX GYN      HX HEENT      HX ORTHOPAEDIC      AR ABDOMEN SURGERY PROC UNLISTED           Family History:   Problem Relation Age of Onset    No Known Problems Mother     No Known Problems Father        Social History     Socioeconomic History    Marital status:      Spouse name: Not on file    Number of children: Not on file    Years of education: Not on file    Highest education level: Not on file   Occupational History    Not on file   Tobacco Use    Smoking status: Never Smoker    Smokeless tobacco: Never Used   Substance and Sexual Activity    Alcohol use: No    Drug use: No    Sexual activity: Not on file   Other Topics Concern    Not on file   Social History Narrative    Not on file     Social Determinants of Health     Financial Resource Strain:     Difficulty of Paying Living Expenses: Not on file   Food Insecurity:     Worried About Running Out of Food in the Last Year: Not on file    Harshad of Food in the Last Year: Not on file   Transportation Needs:     Lack of Transportation (Medical): Not on file    Lack of Transportation (Non-Medical):  Not on file   Physical Activity:     Days of Exercise per Week: Not on file    Minutes of Exercise per Session: Not on file   Stress:     Feeling of Stress : Not on file   Social Connections:     Frequency of Communication with Friends and Family: Not on file    Frequency of Social Gatherings with Friends and Family: Not on file    Attends [employer] Services: Not on file    Active Member of [address] [place] [address] or Organizations: Not on file    Attends Club or Organization Meetings: Not on file    Marital Status: Not on file   Intimate Partner Violence:     Fear of Current or Ex-Partner: Not on file    Emotionally Abused: Not on file    Physically Abused: Not on file    Sexually Abused: Not on file   Housing Stability:     Unable to Pay for Housing in the Last Year: Not on file    Number of [place] in the Last Year: Not on file    Unstable Housing in the Last Year: Not on file         ALLERGIES: Gabapentin and Deserpidine    Review of Systems   Constitutional: Negative. Negative for malaise/fatigue. HENT: Negative. Negative for congestion. Eyes: Negative. Respiratory: Negative. Cardiovascular: Positive for syncope. Negative for chest pain and palpitations. Gastrointestinal: Negative. Negative for bowel incontinence. Endocrine: Negative. Genitourinary: Negative. Negative for bladder incontinence. Musculoskeletal: Positive for back pain and neck pain. Skin: Negative. Allergic/Immunologic: Negative. Neurological: Positive for syncope and headaches. Hematological: Negative. Psychiatric/Behavioral: Negative. Negative for confusion. Vitals:    05/08/22 1226 05/08/22 1229 05/08/22 1230 05/08/22 1233   BP: (!) 178/80      Pulse: 65      Resp: 20      Temp: 97.9 °F (36.6 °C)      SpO2: 99%  99%    Weight:    80.6 kg (177 lb 11.2 oz)   Height:  5' 2\" (1.575 m)              Physical Exam  Vitals and nursing note reviewed. Constitutional:       General: She is not in acute distress. Appearance: Normal appearance. HENT:      Head: Normocephalic and atraumatic. Right Ear: External ear normal.      Left Ear: External ear normal.      Nose: Nose normal.   Eyes:      Extraocular Movements: Extraocular movements intact. Conjunctiva/sclera: Conjunctivae normal.   Cardiovascular:      Rate and Rhythm: Normal rate. Pulses: Normal pulses. Radial pulses are 2+ on the right side and 2+ on the left side. Heart sounds: Normal heart sounds. Pulmonary:      Effort: Pulmonary effort is normal.      Breath sounds: Normal breath sounds. Abdominal:      General: Abdomen is flat. There is no distension. Tenderness: There is no abdominal tenderness. Musculoskeletal:         General: Tenderness present. No swelling, deformity or signs of injury. Normal range of motion. Cervical back: Normal range of motion and neck supple. No tenderness. Skin:     General: Skin is warm and dry.       Capillary Refill: Capillary refill takes less than 2 seconds. Neurological:      General: No focal deficit present. Mental Status: She is alert and oriented to person, place, and time. Cranial Nerves: No cranial nerve deficit. Sensory: No sensory deficit. Motor: No weakness. Psychiatric:         Attention and Perception: Attention normal.         Mood and Affect: Mood normal.         Behavior: Behavior normal.          [facility] [place]  ED Course as of 05/08/22 1327   Sun May 08, 2022   1246 EKG interpretation:   Rhythm: normal sinus rhythm; and regular . Rate (approx.): 62; Axis: normal; Intervals: normal ; ST/T wave: non-specific changes; EKG documented and interpreted by Coy Gonzalez. Lina Manzo MD, Emergency Medicine.   [AL]      ED Course User Index  [AL] Chani Crisostomo MD     LABORATORY RESULTS:  Labs Reviewed   CBC WITH AUTOMATED DIFF - Abnormal; Notable for the following components:       Result Value    RDW 15.4 (*)     IMMATURE GRANULOCYTES 1 (*)     All other components within normal limits   METABOLIC PANEL, COMPREHENSIVE - Abnormal; Notable for the following components:    Creatinine 1.04 (*)     GFR est non-AA 51 (*)     All other components within normal limits   PROTHROMBIN TIME + INR - Abnormal; Notable for the following components:    Prothrombin time 11.4 (*)     All other components within normal limits   TROPONIN-HIGH SENSITIVITY   TSH 3RD GENERATION   POC CHEM8   POC CHEM8       IMAGING RESULTS:  DUPLEX CAROTID BILATERAL         CT HEAD WO CONT   Final Result   No acute intracranial findings.             CTA HEAD NECK W CONT             MEDICATIONS GIVEN:  Medications   sodium chloride 0.9 % bolus infusion 1,000 mL (has no administration in time range)   ondansetron (ZOFRAN) injection 4 mg (4 mg IntraVENous Given 5/8/22 1251)   iopamidoL (ISOVUE-370) 76 % injection 100 mL (100 mL IntraVENous Given 5/8/22 1320)       Differential diagnosis: Syncope, unresponsiveness, dural venous thrombosis, headache, electrolyte abnormality, anemia, thyroid dysfunction    ED physician interpretation of imaging: CT head without acute bleeds   ED physician interpretation of EKG: No STEMI. See my interpretation EKG and ED course above. ED physician interpretation of laboratory results: Lab work without signs of infection, anemia, electrolyte abnormality, thyroid dysfunction or kidney dysfunction    MDM: Patient is a 42-year-old female who has had recurrent episodes of loss of consciousness/syncope longest lasting approximately 20 minutes at home but that was months ago. Patient had an episode today at her nursing facility lasted 3 to 5 minutes requiring sternal rub. CT head and CTA head and neck obtained, no acute findings. Discussed patient's status with her son and he said this is happened about 5 times with 4 ED work-ups with no definitive answer. Outpatient neurology appointment did not have any appreciable findings. Lots of unremarkable lab work and CT scans. Based on continued symptoms and possible episode while in the ED hospitalization is indicated. Hospitalist was contacted and patient admitted for further treatment evaluation    DISPOSITION: Admitted    [address] [place] [address] for Admission  2:05 PM    ED Room Number: LN01/81  Patient Name and age: Enoch Lai 64 y.o.  female  Working Diagnosis:   1. Recurrent syncope        COVID-19 Suspicion:  no  Sepsis present:  no  Reassessment needed: no  Code Status:  Do Not Resuscitate  Readmission: no  Isolation Requirements:  no  Recommended Level of Care:  telemetry  Department:  [facility] ED - [phone]    Other:  Patient is a 42-year-old female with a history of neurocognitive disorder presenting to the ED after syncopal episode. Patient was sitting in chair when she passed out for approximately 3 to 5 minutes. Unresponsive requiring sternal rub. Patient not back to baseline in the ED.   This is happened several times in the recent past with ED work-ups at [facility] and other emergency departments with no findings on CT or lab work. Based on recurrent nature and prolonged syncope hospitalization is required for observation and further evaluation. Discussed this with patient's family at bedside and they feel further work-up would be helpful as she has had multiple repeat visits with no definitive answers. Patient has had follow-up with neurology with no specific findings on an office visit. Lula Mooney.  Flo Chowdhury MD      Procedures

## 2022-05-08 NOTE — ED NOTES
TRANSFER - OUT REPORT:    Verbal report given to Saleem Chaudhary RN (name) on Rogelio Chamorro  being transferred to [facility] (unit) for routine progression of care       Report consisted of patients Situation, Background, Assessment and   Recommendations(SBAR). Information from the following report(s) SBAR, ED Summary, Intake/Output, MAR and Recent Results was reviewed with the receiving nurse. Lines:   Peripheral IV 05/08/22 Left Antecubital (Active)   Site Assessment Clean, dry, & intact 05/08/22 1250   Phlebitis Assessment 0 05/08/22 1250   Infiltration Assessment 0 05/08/22 1250   Dressing Status Clean, dry, & intact 05/08/22 1250   Hub Color/Line Status Pink 05/08/22 1250   Action Taken Blood drawn 05/08/22 1250       Peripheral IV 05/08/22 Right Antecubital (Active)   Site Assessment Clean, dry, & intact 05/08/22 1337   Phlebitis Assessment 0 05/08/22 1337   Infiltration Assessment 0 05/08/22 1337   Dressing Status Clean, dry, & intact 05/08/22 1337   Hub Color/Line Status Blue 05/08/22 1337   Action Taken Blood drawn 05/08/22 1337        Opportunity for questions and clarification was provided.       Patient transported with:   [employer]

## 2022-05-09 ENCOUNTER — APPOINTMENT (OUTPATIENT)
Dept: NON INVASIVE DIAGNOSTICS | Age: 77
End: 2022-05-09
Attending: HOSPITALIST
Payer: MEDICARE

## 2022-05-09 ENCOUNTER — APPOINTMENT (OUTPATIENT)
Dept: NON INVASIVE DIAGNOSTICS | Age: 77
End: 2022-05-09
Attending: INTERNAL MEDICINE
Payer: MEDICARE

## 2022-05-09 LAB
ALBUMIN SERPL-MCNC: 3.3 G/DL (ref 3.5–5)
ALBUMIN/GLOB SERPL: 1.2 {RATIO} (ref 1.1–2.2)
ALP SERPL-CCNC: 59 U/L (ref 45–117)
ALT SERPL-CCNC: 24 U/L (ref 12–78)
ANION GAP SERPL CALC-SCNC: 7 MMOL/L (ref 5–15)
AST SERPL-CCNC: 25 U/L (ref 15–37)
BILIRUB SERPL-MCNC: 0.4 MG/DL (ref 0.2–1)
BUN SERPL-MCNC: 13 MG/DL (ref 6–20)
BUN/CREAT SERPL: 15 (ref 12–20)
CALCIUM SERPL-MCNC: 8.9 MG/DL (ref 8.5–10.1)
CHLORIDE SERPL-SCNC: 109 MMOL/L (ref 97–108)
CO2 SERPL-SCNC: 25 MMOL/L (ref 21–32)
COMMENT, HOLDF: NORMAL
CREAT SERPL-MCNC: 0.84 MG/DL (ref 0.55–1.02)
ECHO AO ASC DIAM: 3.2 CM
ECHO AO ASCENDING AORTA INDEX: 1.86 CM/M2
ECHO AV AREA PEAK VELOCITY: 2.2 CM2
ECHO AV AREA VTI: 2.3 CM2
ECHO AV AREA/BSA PEAK VELOCITY: 1.3 CM2/M2
ECHO AV AREA/BSA VTI: 1.3 CM2/M2
ECHO AV MEAN GRADIENT: 5 MMHG
ECHO AV MEAN VELOCITY: 1 M/S
ECHO AV PEAK GRADIENT: 9 MMHG
ECHO AV PEAK VELOCITY: 1.5 M/S
ECHO AV VELOCITY RATIO: 0.73
ECHO AV VTI: 29.1 CM
ECHO LA DIAMETER INDEX: 2.38 CM/M2
ECHO LA DIAMETER: 4.1 CM
ECHO LA VOL 2C: 35 ML (ref 22–52)
ECHO LA VOL 4C: 43 ML (ref 22–52)
ECHO LA VOL BP: 43 ML (ref 22–52)
ECHO LA VOL/BSA BIPLANE: 25 ML/M2 (ref 16–34)
ECHO LA VOLUME AREA LENGTH: 44 ML
ECHO LA VOLUME INDEX A2C: 20 ML/M2 (ref 16–34)
ECHO LA VOLUME INDEX A4C: 25 ML/M2 (ref 16–34)
ECHO LA VOLUME INDEX AREA LENGTH: 26 ML/M2 (ref 16–34)
ECHO LV E' LATERAL VELOCITY: 9 CM/S
ECHO LV E' SEPTAL VELOCITY: 6 CM/S
ECHO LV FRACTIONAL SHORTENING: 35 % (ref 28–44)
ECHO LV INTERNAL DIMENSION DIASTOLE INDEX: 3.02 CM/M2
ECHO LV INTERNAL DIMENSION DIASTOLIC: 5.2 CM (ref 3.9–5.3)
ECHO LV INTERNAL DIMENSION SYSTOLIC INDEX: 1.98 CM/M2
ECHO LV INTERNAL DIMENSION SYSTOLIC: 3.4 CM
ECHO LV IVSD: 0.9 CM (ref 0.6–0.9)
ECHO LV MASS 2D: 181.4 G (ref 67–162)
ECHO LV MASS INDEX 2D: 105.5 G/M2 (ref 43–95)
ECHO LV POSTERIOR WALL DIASTOLIC: 1 CM (ref 0.6–0.9)
ECHO LV RELATIVE WALL THICKNESS RATIO: 0.38
ECHO LVOT AREA: 3.1 CM2
ECHO LVOT AV VTI INDEX: 0.76
ECHO LVOT DIAM: 2 CM
ECHO LVOT MEAN GRADIENT: 2 MMHG
ECHO LVOT PEAK GRADIENT: 5 MMHG
ECHO LVOT PEAK VELOCITY: 1.1 M/S
ECHO LVOT STROKE VOLUME INDEX: 40.3 ML/M2
ECHO LVOT SV: 69.4 ML
ECHO LVOT VTI: 22.1 CM
ECHO MV A VELOCITY: 0.98 M/S
ECHO MV E DECELERATION TIME (DT): 278.6 MS
ECHO MV E VELOCITY: 0.68 M/S
ECHO MV E/A RATIO: 0.69
ECHO MV E/E' LATERAL: 7.56
ECHO MV E/E' RATIO (AVERAGED): 9.44
ECHO MV E/E' SEPTAL: 11.33
ECHO PV MAX VELOCITY: 1 M/S
ECHO PV PEAK GRADIENT: 4 MMHG
ECHO RV INTERNAL DIMENSION: 3.8 CM
ECHO RV TAPSE: 1.6 CM (ref 1.7–?)
ECHO RVOT PEAK GRADIENT: 2 MMHG
ECHO RVOT PEAK VELOCITY: 0.8 M/S
ERYTHROCYTE [DISTWIDTH] IN BLOOD BY AUTOMATED COUNT: 15.1 % (ref 11.5–14.5)
GLOBULIN SER CALC-MCNC: 2.8 G/DL (ref 2–4)
GLUCOSE SERPL-MCNC: 136 MG/DL (ref 65–100)
HCT VFR BLD AUTO: 39.1 % (ref 35–47)
HGB BLD-MCNC: 12.6 G/DL (ref 11.5–16)
LEFT CCA DIST DIAS: 7.9 CM/S
LEFT CCA DIST SYS: 37.6 CM/S
LEFT CCA PROX DIAS: 11.2 CM/S
LEFT CCA PROX SYS: 81.6 CM/S
LEFT ECA DIAS: 0 CM/S
LEFT ECA SYS: 50.2 CM/S
LEFT ICA DIST DIAS: 10.9 CM/S
LEFT ICA DIST SYS: 35.2 CM/S
LEFT ICA MID DIAS: 12.8 CM/S
LEFT ICA MID SYS: 53.5 CM/S
LEFT ICA PROX DIAS: 9.4 CM/S
LEFT ICA PROX SYS: 37.1 CM/S
LEFT ICA/CCA SYS: 1.4 NO UNITS
LEFT VERTEBRAL DIAS: 9.4 CM/S
LEFT VERTEBRAL SYS: 42.1 CM/S
MCH RBC QN AUTO: 28.8 PG (ref 26–34)
MCHC RBC AUTO-ENTMCNC: 32.2 G/DL (ref 30–36.5)
MCV RBC AUTO: 89.5 FL (ref 80–99)
NRBC # BLD: 0 K/UL (ref 0–0.01)
NRBC BLD-RTO: 0 PER 100 WBC
PLATELET # BLD AUTO: 237 K/UL (ref 150–400)
PMV BLD AUTO: 10.1 FL (ref 8.9–12.9)
POTASSIUM SERPL-SCNC: 4 MMOL/L (ref 3.5–5.1)
PROCALCITONIN SERPL-MCNC: <0.05 NG/ML
PROT SERPL-MCNC: 6.1 G/DL (ref 6.4–8.2)
RBC # BLD AUTO: 4.37 M/UL (ref 3.8–5.2)
RIGHT CCA DIST DIAS: 6.3 CM/S
RIGHT CCA DIST SYS: 31.5 CM/S
RIGHT CCA PROX DIAS: 8.4 CM/S
RIGHT CCA PROX SYS: 75.5 CM/S
RIGHT ECA DIAS: 6.6 CM/S
RIGHT ECA SYS: 55.5 CM/S
RIGHT ICA DIST DIAS: 11.1 CM/S
RIGHT ICA DIST SYS: 44.6 CM/S
RIGHT ICA MID DIAS: 8.9 CM/S
RIGHT ICA MID SYS: 27.4 CM/S
RIGHT ICA PROX DIAS: 10.5 CM/S
RIGHT ICA PROX SYS: 29 CM/S
RIGHT ICA/CCA SYS: 1.4 NO UNITS
RIGHT VERTEBRAL DIAS: 6.8 CM/S
RIGHT VERTEBRAL SYS: 41.7 CM/S
SAMPLES BEING HELD,HOLD: NORMAL
SODIUM SERPL-SCNC: 141 MMOL/L (ref 136–145)
VAS LEFT SUBCLAVIAN PROX EDV: 0 CM/S
VAS LEFT SUBCLAVIAN PROX PSV: 116.1 CM/S
VAS RIGHT SUBCLAVIAN PROX EDV: 0 CM/S
VAS RIGHT SUBCLAVIAN PROX PSV: 102.5 CM/S
WBC # BLD AUTO: 5 K/UL (ref 3.6–11)

## 2022-05-09 PROCEDURE — 93306 TTE W/DOPPLER COMPLETE: CPT | Performed by: SPECIALIST

## 2022-05-09 PROCEDURE — 99220 PR INITIAL OBSERVATION CARE/DAY 70 MINUTES: CPT | Performed by: INTERNAL MEDICINE

## 2022-05-09 PROCEDURE — 93306 TTE W/DOPPLER COMPLETE: CPT

## 2022-05-09 PROCEDURE — 93271 ECG/MONITORING AND ANALYSIS: CPT

## 2022-05-09 PROCEDURE — 93228 REMOTE 30 DAY ECG REV/REPORT: CPT | Performed by: INTERNAL MEDICINE

## 2022-05-09 PROCEDURE — 95816 EEG AWAKE AND DROWSY: CPT | Performed by: PSYCHIATRY & NEUROLOGY

## 2022-05-09 PROCEDURE — APPSS45 APP SPLIT SHARED TIME 31-45 MINUTES: Performed by: NURSE PRACTITIONER

## 2022-05-09 PROCEDURE — 99204 OFFICE O/P NEW MOD 45 MIN: CPT | Performed by: PSYCHIATRY & NEUROLOGY

## 2022-05-09 PROCEDURE — G0378 HOSPITAL OBSERVATION PER HR: HCPCS

## 2022-05-09 PROCEDURE — 85027 COMPLETE CBC AUTOMATED: CPT

## 2022-05-09 PROCEDURE — 80053 COMPREHEN METABOLIC PANEL: CPT

## 2022-05-09 PROCEDURE — 84145 PROCALCITONIN (PCT): CPT

## 2022-05-09 PROCEDURE — 74011250637 HC RX REV CODE- 250/637: Performed by: HOSPITALIST

## 2022-05-09 PROCEDURE — 36415 COLL VENOUS BLD VENIPUNCTURE: CPT

## 2022-05-09 RX ADMIN — ATORVASTATIN CALCIUM 80 MG: 20 TABLET, FILM COATED ORAL at 09:32

## 2022-05-09 RX ADMIN — PANTOPRAZOLE SODIUM 40 MG: 40 TABLET, DELAYED RELEASE ORAL at 06:36

## 2022-05-09 RX ADMIN — FENOFIBRATE 48 MG: 48 TABLET ORAL at 09:32

## 2022-05-09 RX ADMIN — VENLAFAXINE HYDROCHLORIDE 150 MG: 150 CAPSULE, EXTENDED RELEASE ORAL at 09:32

## 2022-05-09 RX ADMIN — BUPROPION HYDROCHLORIDE 150 MG: 150 TABLET, EXTENDED RELEASE ORAL at 09:32

## 2022-05-09 RX ADMIN — LEVOTHYROXINE SODIUM 75 MCG: 0.07 TABLET ORAL at 06:36

## 2022-05-09 RX ADMIN — BUSPIRONE HYDROCHLORIDE 10 MG: 10 TABLET ORAL at 09:32

## 2022-05-09 NOTE — PROGRESS NOTES
Reason for Admission:  syncope                     RUR Score:   N/A                  Plan for utilizing home health:    none      PCP: First and Last name:  Stefanie Carter NP     Name of Practice:    Are you a current patient: Yes/No: yes   Approximate date of last visit: end of January 2022   Can you participate in a virtual visit with your PCP: yes                    Current Advanced Directive/Advance Care Plan: No Order  Pt's son is her POA; he will bring in papers and DNR    Healthcare Decision Maker:   Click here to complete [address] including selection of the Healthcare Decision Maker Relationship (ie \"Primary\")                             Transition of Care Plan:     Met with pt for d/c planning. Also spoke with her son, Juan Daniel Hill, to fill in the gaps pt was unable to provide. Pt is a LTC resident at [facility]. Pt stated she is independent with ADL's using a cane, rollator or RW. Medications are provided through [facility]. 1. Pt's son to transport at d/c (he lives an hour away and needs advance notice)  2. EEG 5/9  3. Pt to have an event monitor placed at d/c  4. Cardiology, neurology following   5. Pt will need a covid PCR prior to d/c  6. Pt has a sitter and needs to be sitter-free to return to shelter  7. CM following for any needs    Care Management Interventions  PCP Verified by CM: Yes  Mode of Transport at Discharge: Other (see comment)  Transition of Care Consult (CM Consult): Discharge Planning  Support Systems: Child(cinthya),Assisted Living  Confirm Follow Up Transport: Family  Discharge Location  Patient Expects to be Discharged to[de-identified] Assisted Living                          1202:  [id number] [address] [facility] and spoke to Maggie Guevara, [place] ([phone]). Pt needs to be sitter-free x24 hrs prior to return to the memory care unit. Pt will need a covid PCR prior to d/c. When pt is ready for d/c, nursing to call report to [facility] at [phone].   AVS to be faxed to [phone], attention Soni. CM Note:  Pt off the floor for an EEG. Will meet with her for d/c planning assessment. Pt to return to [facility] (memory unit).   DEBBIE Lemon

## 2022-05-09 NOTE — PROGRESS NOTES
Cardiac monitor ordered. Unit is charging. Please contact EKG day of discharge allowing at least one hour lead time. Monitor can be placed anytime day of discharge. Call EKG ext [phone].

## 2022-05-09 NOTE — ROUTINE PROCESS
1900  Bedside shift change report given to Karma Solis (oncoming nurse) by Iona Orta RN (offgoing nurse). Report included the following information SBAR, Kardex, ED Summary, Intake/Output, and Recent Results. 2235  Unable to complete admission documentation due to patient confusion. A list of medication was send with patient from facility. A copy of the list was sent to pharmacy to go over. 2325  Patient attempting to get out of bed. Redirected patient and educated patient on the importance off calling before getting up. Informed patient of Dr. Qi Carlos for bedrest. Pure wick changed and patient was compliant. 0430  Patient was stuck twice by RN. Could not get the last tube to fill. 3928  Lab called and said that the CBC clotted and needed to be reordered. CBC and Prolactin will need to be draw by phlebotomy today. 0700  Bedside shift change report given to Bárbara LEWIS (oncoming nurse) by Karma Solis (offgoing nurse). Report included the following information SBAR, Kardex, ED Summary, Intake/Output, and Recent Results. This patient was assisted with Intentional Toileting every 2 hours during this shift as appropriate. Documentation of ambulation and output reflected on Flowsheet as appropriate. Purposeful hourly rounding was completed using AIDET and 5Ps. Outcomes of PHR documented as they occurred. Bed alarm in use as appropriate. Dual Suction and ambubag in place.

## 2022-05-09 NOTE — CONSULTS
NEUROLOGY CONSULT NOTE    Patient ID:  Stacie Koo  [id number]  29 y.o.  1945    Date of Consultation:  May 9, 2022    Referring Physician: Dr. Fatou Cunningham    Reason for Consultation:  syncope    History of Present Illness:     Patient Active Problem List    Diagnosis Date Noted    Syncope 05/08/2022    Late onset Alzheimer's disease without behavioral disturbance ([facility] [address] 11/29/2021    BERTRAM on CPAP     Type II or unspecified type diabetes mellitus without mention of complication, not stated as uncontrolled 11/24/2014    Hypothyroid 10/28/2014    HLD (hyperlipidemia) 10/28/2014    Depression 10/28/2014    Anxiety 10/28/2014    Incontinence of urine in female 10/28/2014    OA (osteoarthritis) 10/28/2014     Past Medical History:   Diagnosis Date    Depression     Hypercholesterolemia     BERTRAM on CPAP     Dr. Rochelle Santo     Thyroid disease     Urinary incontinence       Past Surgical History:   Procedure Laterality Date    HX COLONOSCOPY      HX ENDOSCOPY      HX GYN      HX HEENT      HX ORTHOPAEDIC      MT ABDOMEN SURGERY PROC UNLISTED        Prior to Admission medications    Medication Sig Start Date End Date Taking? Authorizing Provider   busPIRone (BUSPAR) 10 mg tablet Take 10 mg by mouth daily. Yes Provider, Historical   melatonin 5 mg tablet Take 10 mg by mouth nightly. Indications: sleep    Provider, Historical   acetaminophen (TylenoL) 325 mg tablet Take 650 mg by mouth every four (4) hours as needed for Pain. Provider, Historical   atorvastatin (LIPITOR) 80 mg tablet Take 1 Tab by mouth daily. 1/28/21   Ember Lopez MD   venlafaxine-SR [facility] 150 mg capsule Take 1 capsule by mouth daily. 1/28/21   Ember Lopez MD   buPROPion XL (WELLBUTRIN XL) 150 mg tablet Take 1 tablet by mouth daily 1/28/21   Ember Lopez MD   fenofibric acid [facility]) 135 mg capsule Take 1 capsule by mouth daily.  1/28/21   Ember Lopez MD   levothyroxine (SYNTHROID) 75 mcg tablet Take 1 Tab by mouth Daily (before breakfast). 1/28/21   Joe Burroughs MD   celecoxib (CELEBREX) 200 mg capsule Take 1 Cap by mouth daily. Indications: joint damage causing pain and loss of function  Patient taking differently: Take 200 mg by mouth daily as needed. Indications: joint damage causing pain and loss of function 1/28/21   Joe Burroughs MD   omeprazole (PRILOSEC) 40 mg capsule Take 1 Cap by mouth daily. 1/28/21   Joe Burroughs MD   SITagliptin (Januvia) 25 mg tablet TAKE ONE TABLET BY MOUTH DAILY 1/28/21   Joe Burroughs MD   donepeziL (ARICEPT) 5 mg tablet Take 1 tablet by mouth nightly for mild to moderate Alzheimer's type Dementia. 12/18/20   Joe Burroughs MD     Allergies   Allergen Reactions    Gabapentin Drowsiness    Deserpidine Other (comments)     Puts pressure around her head like a ring      Social History     Tobacco Use    Smoking status: Never Smoker    Smokeless tobacco: Never Used   Substance Use Topics    Alcohol use: No      Family History   Problem Relation Age of Onset    No Known Problems Mother     No Known Problems Father         Subjective: Jessica Kwon is a 64 y.o. LKQY with history of dementia, essential tremors, convulsive syncope, lumbar spondylosis, DM, B12 deficiency, anxiety, depression, BERTRAM, hypercholesterolemia and thyroid disorder who was admitted at [facility] 5/8/2022 for syncope. Patient is brought in from skilled nursing facility 5/8/2022 after a syncopal spell. Per EMS patient was sitting in her chair when she passed out. Staff had to do sternal rub for her her to come around. Event lasted for 3 to 5 minutes. NIH stroke scale was 0. Patient complaining of headaches. In the ER blood pressure was 178/80. Labs done revealed unremarkable BMP, CBC, PTT, INR, troponin and procalcitonin. Head CT without contrast did not reveal any acute process.   Head and neck CTA with contrast did not reveal any flow-limiting stenosis or aneurysm. No ICA stenosis. Dural venous sinuses are patent. Moderate to severe multilevel cervical spondylosis. Carotid Doppler studies did not reveal any significant stenosis. Echocardiogram and EEG are pending. When seen patient reports no recurrence. Outside reports reviewed: office notes, ER records, radiology reports, lab reports. Review of Systems:    Pertinent items are noted in HPI. Objective:     Patient Vitals for the past 8 hrs:   BP Temp Pulse Resp SpO2 Weight   05/09/22 0738 (!) 151/84 97.9 °F (36.6 °C) 65 16 95 %    05/09/22 0700   60      05/09/22 0258 134/76 97.8 °F (36.6 °C) (!) 55 16 98 % 70.4 kg (155 lb 3.2 oz)     PHYSICAL EXAM:    NEUROLOGICAL EXAM:    Appearance: The patient is well developed, well nourished, provides a coherent history and is in no acute distress. Mental Status: Oriented to place and person. Fluent, no aphasia or dysarthria. Mood and affect appropriate. Cranial Nerves:   Intact visual fields. TAYLOR, EOM's full, no nystagmus, no ptosis. Facial sensation is normal. Corneal reflexes are intact. Facial movement is symmetric. Hearing is normal bilaterally. Palate is midline with normal elevation. Sternocleidomastoid and trapezius muscles are normal. Tongue is midline. Motor:  5/5 strength in upper and lower proximal and distal muscles. Normal bulk and tone. No fasciculations. No pronator drift. Reflexes:   Deep tendon reflexes 1+/4 and symmetrical. Downgoing toes. Sensory:   Normal to cold. Gait:  Not tested. Tremor:   No tremor noted today. Cerebellar:  Intact FTN/JAI/HTS.        Imaging  CT Head, head/neck CTA: reviewed    Lab Review    Recent Results (from the past 24 hour(s))   CBC WITH AUTOMATED DIFF    Collection Time: 05/08/22 12:32 PM   Result Value Ref Range    WBC 5.3 3.6 - 11.0 K/uL    RBC 4.25 3.80 - 5.20 M/uL    HGB 12.0 11.5 - 16.0 g/dL    HCT 38.0 35.0 - 47.0 %    MCV 89.4 80.0 - 99.0 FL    MCH 28.2 26.0 - 34.0 PG    MCHC 31.6 30.0 - 36.5 g/dL    RDW 15.4 (H) 11.5 - 14.5 %    PLATELET 438 804 - 594 K/uL    MPV 9.7 8.9 - 12.9 FL    NRBC 0.0 0  WBC    ABSOLUTE NRBC 0.00 0.00 - 0.01 K/uL    NEUTROPHILS 70 32 - 75 %    LYMPHOCYTES 18 12 - 49 %    MONOCYTES 9 5 - 13 %    EOSINOPHILS 1 0 - 7 %    BASOPHILS 1 0 - 1 %    IMMATURE GRANULOCYTES 1 (H) 0.0 - 0.5 %    ABS. NEUTROPHILS 3.7 1.8 - 8.0 K/UL    ABS. LYMPHOCYTES 0.9 0.8 - 3.5 K/UL    ABS. MONOCYTES 0.5 0.0 - 1.0 K/UL    ABS. EOSINOPHILS 0.1 0.0 - 0.4 K/UL    ABS. BASOPHILS 0.0 0.0 - 0.1 K/UL    ABS. IMM. GRANS. 0.0 0.00 - 0.04 K/UL    DF AUTOMATED     METABOLIC PANEL, COMPREHENSIVE    Collection Time: 05/08/22 12:32 PM   Result Value Ref Range    Sodium 140 136 - 145 mmol/L    Potassium 4.1 3.5 - 5.1 mmol/L    Chloride 108 97 - 108 mmol/L    CO2 27 21 - 32 mmol/L    Anion gap 5 5 - 15 mmol/L    Glucose 97 65 - 100 mg/dL    BUN 15 6 - 20 MG/DL    Creatinine 1.04 (H) 0.55 - 1.02 MG/DL    BUN/Creatinine ratio 14 12 - 20      GFR est AA >60 >60 ml/min/1.73m2    GFR est non-AA 51 (L) >60 ml/min/1.73m2    Calcium 9.0 8.5 - 10.1 MG/DL    Bilirubin, total 0.6 0.2 - 1.0 MG/DL    ALT (SGPT) 27 12 - 78 U/L    AST (SGOT) 32 15 - 37 U/L    Alk.  phosphatase 67 45 - 117 U/L    Protein, total 6.8 6.4 - 8.2 g/dL    Albumin 3.6 3.5 - 5.0 g/dL    Globulin 3.2 2.0 - 4.0 g/dL    A-G Ratio 1.1 1.1 - 2.2     PROTHROMBIN TIME + INR    Collection Time: 05/08/22 12:32 PM   Result Value Ref Range    INR 1.1 0.9 - 1.1      Prothrombin time 11.4 (H) 9.0 - 11.1 sec   TROPONIN-HIGH SENSITIVITY    Collection Time: 05/08/22 12:32 PM   Result Value Ref Range    Troponin-High Sensitivity 5 0 - 51 ng/L   TSH 3RD GENERATION    Collection Time: 05/08/22 12:32 PM   Result Value Ref Range    TSH 2.12 0.36 - 3.74 uIU/mL   POC CHEM8    Collection Time: 05/08/22 12:32 PM   Result Value Ref Range    Calcium, ionized (POC) 1.22 1.12 - 1.32 mmol/L    Sodium (POC) 143 136 - 145 mmol/L    Potassium (POC) 4.2 3.5 - 5.1 mmol/L Chloride (POC) 105 98 - 107 mmol/L    CO2 (POC) 27.9 21 - 32 mmol/L    Anion gap (POC) 11 10 - 20 mmol/L    Glucose (POC) 100 65 - 100 mg/dL    Creatinine (POC) 0.82 0.6 - 1.3 mg/dL    GFRAA, POC >60 >60 ml/min/1.73m2    GFRNA, POC >60 >60 ml/min/1.73m2    Comment Comment Not Indicated.      EKG, 12 LEAD, INITIAL    Collection Time: 05/08/22 12:46 PM   Result Value Ref Range    Ventricular Rate 62 BPM    Atrial Rate 62 BPM    P-R Interval 154 ms    QRS Duration 86 ms    Q-T Interval 416 ms    QTC Calculation (Bezet) 422 ms    Calculated P Axis 13 degrees    Calculated R Axis -18 degrees    Calculated T Axis 38 degrees    Diagnosis       Normal sinus rhythm  Inferior infarct , age undetermined  Poor R-wave Progression  Abnormal ECG  No previous ECGs available  Confirmed by Abhilash Zee MD., [facility] ([id number]) on 5/8/2022 9:40:28 PM     DUPLEX CAROTID BILATERAL    Collection Time: 05/08/22  4:41 PM   Result Value Ref Range    Right subclavian prox .5 cm/s    Right subclavian prox EDV 0.0 cm/s    Right CCA prox sys 75.5 cm/s    Right CCA prox osman 8.4 cm/s    Right cca dist sys 31.5 cm/s    Right CCA dist osman 6.3 cm/s    Right eca sys 55.5 cm/s    RIGHT EXTERNAL CAROTID ARTERY D 6.60 cm/s    Right ICA prox sys 29.0 cm/s    Right ICA prox osman 10.5 cm/s    Right ICA mid sys 27.4 cm/s    Right ICA mid osman 8.9 cm/s    Right ICA dist sys 44.6 cm/s    Right ICA dist osman 11.1 cm/s    Right vertebral sys 41.7 cm/s    RIGHT VERTEBRAL ARTERY D 6.80 cm/s    Right ICA/CCA sys 1.4 no units    Left subclavian prox .1 cm/s    Left subclavian prox EDV 0.0 cm/s    Left CCA prox sys 81.6 cm/s    Left CCA prox osman 11.2 cm/s    Left CCA dist sys 37.6 cm/s    Left CCA dist osman 7.9 cm/s    Left ECA sys 50.2 cm/s    LEFT EXTERNAL CAROTID ARTERY D 0.00 cm/s    Left ICA prox sys 37.1 cm/s    Left ICA prox osman 9.4 cm/s    Left ICA mid sys 53.5 cm/s    Left ICA mid osman 12.8 cm/s    Left ICA dist sys 35.2 cm/s    Left ICA dist osman 10.9 cm/s    Left vertebral sys 42.1 cm/s    LEFT VERTEBRAL ARTERY D 9.40 cm/s    Left ICA/CCA sys 1.40 no units   GLUCOSE, POC    Collection Time: 05/08/22  8:56 PM   Result Value Ref Range    Glucose (POC) 187 (H) 65 - 117 mg/dL    Performed by [facility] (PCT)    METABOLIC PANEL, COMPREHENSIVE    Collection Time: 05/09/22  4:08 AM   Result Value Ref Range    Sodium 141 136 - 145 mmol/L    Potassium 4.0 3.5 - 5.1 mmol/L    Chloride 109 (H) 97 - 108 mmol/L    CO2 25 21 - 32 mmol/L    Anion gap 7 5 - 15 mmol/L    Glucose 136 (H) 65 - 100 mg/dL    BUN 13 6 - 20 MG/DL    Creatinine 0.84 0.55 - 1.02 MG/DL    BUN/Creatinine ratio 15 12 - 20      GFR est AA >60 >60 ml/min/1.73m2    GFR est non-AA >60 >60 ml/min/1.73m2    Calcium 8.9 8.5 - 10.1 MG/DL    Bilirubin, total 0.4 0.2 - 1.0 MG/DL    ALT (SGPT) 24 12 - 78 U/L    AST (SGOT) 25 15 - 37 U/L    Alk. phosphatase 59 45 - 117 U/L    Protein, total 6.1 (L) 6.4 - 8.2 g/dL    Albumin 3.3 (L) 3.5 - 5.0 g/dL    Globulin 2.8 2.0 - 4.0 g/dL    A-G Ratio 1.2 1.1 - 2.2     CBC W/O DIFF    Collection Time: 05/09/22  8:47 AM   Result Value Ref Range    WBC 5.0 3.6 - 11.0 K/uL    RBC 4.37 3.80 - 5.20 M/uL    HGB 12.6 11.5 - 16.0 g/dL    HCT 39.1 35.0 - 47.0 %    MCV 89.5 80.0 - 99.0 FL    MCH 28.8 26.0 - 34.0 PG    MCHC 32.2 30.0 - 36.5 g/dL    RDW 15.1 (H) 11.5 - 14.5 %    PLATELET 223 232 - 327 K/uL    MPV 10.1 8.9 - 12.9 FL    NRBC 0.0 0  WBC    ABSOLUTE NRBC 0.00 0.00 - 0.01 K/uL   PROCALCITONIN    Collection Time: 05/09/22  8:47 AM   Result Value Ref Range    Procalcitonin <0.05 ng/mL   SAMPLES BEING HELD    Collection Time: 05/09/22  8:47 AM   Result Value Ref Range    SAMPLES BEING HELD 1PST     COMMENT        Add-on orders for these samples will be processed based on acceptable specimen integrity and analyte stability, which may vary by analyte.          Assessment:   Syncope and collapse  Late onset Alzheimer's dementia    Plan:   Neurological examination reveals baseline cognitive issues but no focal findings. Event is more consistent with syncope. Not classic for seizures. Head CT without contrast did not reveal any acute process. No indication to do any other neuroimaging. Head and neck CTA did not reveal any flow-limiting stenosis or aneurysm. No ICA stenosis. Moderate to severe multilevel cervical spondylosis. Carotid Doppler studies did not reveal any significant stenosis. No intervention necessary. EEG was ordered and done and revealed normal awake and drowsy patterns. No seizures. Echocardiogram was done revealed EF 55 to 60%. Grade 1 diastolic dysfunction. Patient was referred to cardiology and event monitor is in process. Donepezil is on hold. I will address this in clinic. No further recommendations from a neurological standpoint. Thank you for the consult. This note was created using voice recognition software. Despite editing, there may be syntax errors.

## 2022-05-09 NOTE — PROGRESS NOTES
05/09/22    Medicare Outpatient Observation Notice (MOON)/ [place] Outpatient Observation Notice (Mata Folds) provided to patient/representative with verbal explanation of the notice. Time allotted for questions regarding the notice. Patient /representative provided a completed copy of the MOON/VOON notice. Copy placed on bedside chart. Patient unable to sign due to Off the Floor. Left copy in room and copy placed in chart.

## 2022-05-09 NOTE — PROGRESS NOTES
Nutrition Note    Best practice alert was triggered based on results obtained during nursing admission assessment for Unintentional wt loss: 2-13# and poor appetite. Last 3 Recorded Weights in this Encounter    05/08/22 1233 05/09/22 0258   Weight: 80.6 kg (177 lb 11.2 oz) 70.4 kg (155 lb 3.2 oz)     Patient Vitals for the past 168 hrs:   % Diet Eaten   05/09/22 0921 51 - 75%     Wt Readings from Last 10 Encounters:   05/09/22 70.4 kg (155 lb 3.2 oz)   11/29/21 79.1 kg (174 lb 6.4 oz)   01/28/21 74.5 kg (164 lb 3.2 oz)   03/09/20 76.2 kg (168 lb)   01/06/20 76.2 kg (168 lb)   12/10/19 78 kg (172 lb)   10/24/19 78 kg (172 lb)   09/23/19 76.2 kg (168 lb)   08/02/19 74.8 kg (165 lb)   07/25/19 75.8 kg (167 lb)       The patient's chart was reviewed- RD ordered re-weigh. Noted weight loss appears to be a 20# difference x 1 day. PO intake averaging 50-75% of single meal documented. Patient off floor at time of attempted RD visit, and no family available to provide information. Plan to see patient for rescreen per policy. Thank you.      Paul Peres RD  Ext: [phone], or via [facility] Principal Discharge DX:	Closed displaced fracture of middle phalanx of left little finger, initial encounter

## 2022-05-09 NOTE — PROCEDURES
[facility] [place] [address]     Electroencephalogram Report    Procedure ID: SFA  Procedure Date: 05/09/2022   Patient Name: Jessica Kwon YOB: 1945   Procedure Type: Routine Medical Record No: [id number]     INDICATION: Syncope    STATE: Awake and drowsy . DESCRIPTION OF PROCEDURE: Electrodes were applied in accordance with the international 10-20 system of electrode placement. EEG was reviewed in both bipolar and referential montages. Description of Activity:  During wakefulness, there is continuous runs of 8-9 Hz symmetric posterior alpha rhythm, that attenuate symmetrically with eye opening. Low voltage beta activity occurs symmetrically at the anterior head regions bilaterally. During drowsiness, there is attenuation of the alpha rhythm and low voltage theta activity occurs bilaterally. Intermittent photic stimulation was performed and did not induce posterior driving responses. No sharp or spike discharges, seizures or epileptiform discharges seen. No focal asymmetry. Clinical Interpretation: This EEG, performed during wakefulness and drowsiness is normal. There is no focal asymmetry, seizures or epileptiform discharges seen.        Medications:  Current Facility-Administered Medications   Medication Dose Route Frequency    atorvastatin (LIPITOR) tablet 80 mg  80 mg Oral DAILY    buPROPion XL (WELLBUTRIN XL) tablet 150 mg  150 mg Oral DAILY    busPIRone (BUSPAR) tablet 10 mg  10 mg Oral DAILY    levothyroxine (SYNTHROID) tablet 75 mcg  75 mcg Oral 6am    pantoprazole (PROTONIX) tablet 40 mg  40 mg Oral ACB    venlafaxine-SR (EFFEXOR-XR) capsule 150 mg  150 mg Oral DAILY WITH BREAKFAST    melatonin tablet 3 mg  3 mg Oral QHS PRN    fenofibrate nanocrystallized (TRICOR) tablet 48 mg  48 mg Oral DAILY

## 2022-05-09 NOTE — PROGRESS NOTES
0730 Bedside and Verbal shift change report given to April RN (oncoming nurse) by Michele Pope RN (offgoing nurse). Report included the following information SBAR and Kardex. This patient was assisted with Intentional Toileting every 2 hours during this shift as appropriate. Documentation of ambulation and output reflected on Flowsheet as appropriate. Purposeful hourly rounding was completed using AIDET and 5Ps. Outcomes of PHR documented as they occurred. Bed alarm in use as appropriate. Dual Suction and ambubag in place. 1000 Dr Vikas Stone to room, nurse and son pointed out patient had DNR but has a DNR. 1930 Bedside and Verbal shift change report given to Joseluis Hernandes and Chiara RN (oncoming nurse) by April RN (offgoing nurse). Report included the following information SBAR and Kardex.

## 2022-05-09 NOTE — PROGRESS NOTES
Problem: Falls - Risk of  Goal: *Absence of Falls  Description: Document Constance Boucher Fall Risk and appropriate interventions in the flowsheet.   Outcome: Progressing Towards Goal  Note: Fall Risk Interventions:  Mobility Interventions: Bed/chair exit alarm,Patient to call before getting OOB    Mentation Interventions: Bed/chair exit alarm         Elimination Interventions: Bed/chair exit alarm,Call light in reach,Patient to call for help with toileting needs,Stay With Me (per policy)              Problem: Patient Education: Go to Patient Education Activity  Goal: Patient/Family Education  Outcome: Progressing Towards Goal     Problem: Syncope  Goal: *Absence of injury  Outcome: Progressing Towards Goal  Goal: Decrease or eliminate episodes of syncope  Outcome: Progressing Towards Goal     Problem: Patient Education: Go to Patient Education Activity  Goal: Patient/Family Education  Outcome: Progressing Towards Goal

## 2022-05-09 NOTE — PROGRESS NOTES
Cardiology Initial Care Encounter    Patient: Darrion Ruelas MRN: [id number]     YOB: 1945  Age: 64 y.o. Sex: female      Admit Date: 5/8/2022       Assessment/Plan     1 recurrent syncope, ? Etiology. ECHO , OP event monitor. Neuro work up underway. Check orthostatics. Son in agreement. 2. Dementia:     Will arrange OP event monitor. HPI     Darrion Ruelas is a 64 y.o.  female  with PMH of dementia. Recurrent syncope who presented from [facility] with recurrent syncope. Per her son who is at the bedside this is the 6th time she has had a syncopal spell the longest being 20 minutes, He describes her as convulsing during the episodes and then wakes up and has no recall and is \"normal\" He denies that she has any incontinence. She has never seen a cardiologist for the episodes. According to her son, she had EEG done in November of last year , which did not show any seizure. In the emergency room, CT scan of the head was done which showed no acute findings. CTA also was unremarkable. Her son tells me that these episodes  happens when she is in a standing position. However per ER physician today she was sitting down when she became unresponsive for 5 minutes then woke up on her own. The patient denies chest pain, dependent edema, diaphoresis, ROBISON, orthopnea, palpitations, PND, shortness of breath, claudication or syncope. The patient has been referred to cardiology for on going management of recurrent syncope      Review of Symptoms:  Constitutional: negative for fatigue  ENT: negative   Respiratory: negative for dyspnea on exertion  Gastrointestinal: negative   Genitourinary: no dysuria, hematuria, frequency    Musculoskeletal:negative  Neurological: positive for memory problems  Other systems reviewed and negative except as above. Previous cardiac hx  No specialty comments available.   Risk factors:     Social History     Tobacco Use    Smoking status: Never Smoker    Smokeless tobacco: Never Used   Substance Use Topics    Alcohol use: No     Family History   Problem Relation Age of Onset    No Known Problems Mother     No Known Problems Father        Current Facility-Administered Medications   Medication Dose Route Frequency    atorvastatin (LIPITOR) tablet 80 mg  80 mg Oral DAILY    buPROPion XL (WELLBUTRIN XL) tablet 150 mg  150 mg Oral DAILY    busPIRone (BUSPAR) tablet 10 mg  10 mg Oral DAILY    levothyroxine (SYNTHROID) tablet 75 mcg  75 mcg Oral 6am    pantoprazole (PROTONIX) tablet 40 mg  40 mg Oral ACB    venlafaxine-SR (EFFEXOR-XR) capsule 150 mg  150 mg Oral DAILY WITH BREAKFAST    melatonin tablet 3 mg  3 mg Oral QHS PRN    fenofibrate nanocrystallized (TRICOR) tablet 48 mg  48 mg Oral DAILY       Objective:     Vitals:    05/08/22 2327 05/09/22 0258 05/09/22 0700 05/09/22 0738   BP: 136/79 134/76  (!) 151/84   Pulse: (!) 59 (!) 55 60 65   Resp: 20 16  16   Temp: 98 °F (36.7 °C) 97.8 °F (36.6 °C)  97.9 °F (36.6 °C)   SpO2: 100% 98%  95%   Weight:  70.4 kg (155 lb 3.2 oz)     Height:            Intake and Output:  Current Shift: 05/09 0701 - 05/09 1900  In: 240 [P.O.:240]  Out: -   Last three shifts: 05/07 1901 - 05/09 0700  In: 1100 [P.O.:100; I.V.:1000]  Out: -           Gen: Well-developed, well-nourished, in no acute distress  Neck: Supple, No JVD, No Carotid Bruit,   Resp: No accessory muscle use, Clear breath sounds, No rales or rhonchi  Card: Regular Rate,  Rythm, Normal S1, S2, No murmurs, rubs or gallop.    Abd:  Soft, non-tender, non-distended,  BS+  MSK: No cyanosis  Skin: No rashes    Neuro: moving all four extremities , follows commands appropriately  Psych:  Limited  insight, oriented to person, alert, Nml Affect  LE: No edema    EKG:   EKG Results     Procedure 720 Value Units Date/Time    EKG, 12 LEAD, INITIAL [[id number]] Collected: 05/08/22 1246    Order Status: Completed Updated: 05/08/22 2140     Ventricular Rate 62 BPM      Atrial Rate 62 BPM      P-R Interval 154 ms      QRS Duration 86 ms      Q-T Interval 416 ms      QTC Calculation (Bezet) 422 ms      Calculated P Axis 13 degrees      Calculated R Axis -18 degrees      Calculated T Axis 38 degrees      Diagnosis --     Normal sinus rhythm  Inferior infarct , age undetermined  Poor R-wave Progression  Abnormal ECG  No previous ECGs available  Confirmed by Hector Rosado MD., [facility] ([id number]) on 5/8/2022 9:40:28 PM              TELEMETRY: SR     Lab/Data Review:  BMP:   Lab Results   Component Value Date/Time     05/09/2022 04:08 AM    K 4.0 05/09/2022 04:08 AM     (H) 05/09/2022 04:08 AM    CO2 25 05/09/2022 04:08 AM    AGAP 7 05/09/2022 04:08 AM     (H) 05/09/2022 04:08 AM    BUN 13 05/09/2022 04:08 AM    CREA 0.84 05/09/2022 04:08 AM    GFRAA >60 05/09/2022 04:08 AM    GFRNA >60 05/09/2022 04:08 AM     CBC:   Lab Results   Component Value Date/Time    WBC 5.0 05/09/2022 08:47 AM    HGB 12.6 05/09/2022 08:47 AM    HCT 39.1 05/09/2022 08:47 AM     05/09/2022 08:47 AM     TSH 2.12    Signed By: Rodrigue Dillard NP     May 9, 2022

## 2022-05-09 NOTE — PROGRESS NOTES
Stanley Katz amarjit [place] 77  [address] [facility], [address] [place] [address]  [phone]      Medical Progress Note      NAME: Grace Hughes   :  1945  MRM:  [id number]    Date/Time of service: 2022  3:27 PM       Subjective:     Chief Complaint:  Patient was personally seen and examined by me during this time period. Chart reviewed. Is in bed with no acute distress has no fevers no chills no nausea no vomiting, relatively comfortable has no other new complaints     Objective:       Vitals:       Last 24hrs VS reviewed since prior progress note.  Most recent are:    Visit Vitals  /74 (BP 1 Location: Right upper arm, BP Patient Position: At rest)   Pulse 66   Temp 97.3 °F (36.3 °C)   Resp 16   Ht 5' 2\" (1.575 m)   Wt 70.3 kg (155 lb)   SpO2 97%   BMI 28.35 kg/m²     SpO2 Readings from Last 6 Encounters:   22 97%   21 93%   21 99%   21 96%   20 97%   20 97%            Intake/Output Summary (Last 24 hours) at 2022 1527  Last data filed at 2022 1440  Gross per 24 hour   Intake 1580 ml   Output 200 ml   Net 1380 ml        Exam:     Physical Exam:    Gen:  Well-developed, well-nourished, in no acute distress  HEENT:  Pink conjunctivae, PERRL, hearing intact to voice, moist mucous membranes  Neck:  Supple, without masses, thyroid non-tender  Resp:  No accessory muscle use, clear breath sounds without wheezes rales or rhonchi  Card:  No murmurs, normal S1, S2 without thrills, bruits or peripheral edema  Abd:  Soft, non-tender, non-distended, normoactive bowel sounds are present,  Musc:  No cyanosis or clubbing  Skin:  No rashes or ulcers, skin turgor is good  Neuro:  Cranial nerves 3-12 are grossly intact,  Psych:  Good insight, oriented to person, place and time, alert      Medications Reviewed: (see below)    Lab Data Reviewed: (see below)    ______________________________________________________________________    Medications:     Current Facility-Administered Medications   Medication Dose Route Frequency    atorvastatin (LIPITOR) tablet 80 mg  80 mg Oral DAILY    buPROPion XL (WELLBUTRIN XL) tablet 150 mg  150 mg Oral DAILY    busPIRone (BUSPAR) tablet 10 mg  10 mg Oral DAILY    levothyroxine (SYNTHROID) tablet 75 mcg  75 mcg Oral 6am    pantoprazole (PROTONIX) tablet 40 mg  40 mg Oral ACB    venlafaxine-SR (EFFEXOR-XR) capsule 150 mg  150 mg Oral DAILY WITH BREAKFAST    melatonin tablet 3 mg  3 mg Oral QHS PRN    fenofibrate nanocrystallized (TRICOR) tablet 48 mg  48 mg Oral DAILY          Lab Review:     Recent Labs     05/09/22  0847 05/08/22  1232   WBC 5.0 5.3   HGB 12.6 12.0   HCT 39.1 38.0    248     Recent Labs     05/09/22  0408 05/08/22  1232    140   K 4.0 4.1   * 108   CO2 25 27   * 97   BUN 13 15   CREA 0.84 1.04*   CA 8.9 9.0   ALB 3.3* 3.6   TBILI 0.4 0.6   ALT 24 27   INR  --  1.1     Lab Results   Component Value Date/Time    Glucose (POC) 187 (H) 05/08/2022 08:56 PM    Glucose (POC) 100 05/08/2022 12:32 PM          Assessment / Plan:      Active Problems:    Syncope (5/8/2022)    1 syncope that seems to be recurring, patient was seen by cardiology who recommends a event monitor for 30 days and an echo cardiology been ordered and pending, patient has had a previous EEG done previously which was unremarkable, neurology has been consulted, awaiting official recommendations, stable otherwise, CT scan of the head negative, other Dopplers unremarkable,    2 history of anxiety depression stable seems to be around baseline status    3 history of tremors no history of Parkinson disease follows on an outpatient basis with neurology stable for now                       Care Plan discussed with: Family    Discussed:  Care Plan    Prophylaxis:  Lovenox    Disposition:  Home w/Family           ___________________________________________________    Attending Physician: Oracio Atkins MD

## 2022-05-10 LAB
B PERT DNA SPEC QL NAA+PROBE: NOT DETECTED
BORDETELLA PARAPERTUSSIS PCR, BORPAR: NOT DETECTED
C PNEUM DNA SPEC QL NAA+PROBE: NOT DETECTED
FLUAV H1 2009 PAND RNA SPEC QL NAA+PROBE: NOT DETECTED
FLUAV H1 RNA SPEC QL NAA+PROBE: NOT DETECTED
FLUAV H3 RNA SPEC QL NAA+PROBE: NOT DETECTED
FLUAV SUBTYP SPEC NAA+PROBE: NOT DETECTED
FLUBV RNA SPEC QL NAA+PROBE: NOT DETECTED
HADV DNA SPEC QL NAA+PROBE: NOT DETECTED
HCOV 229E RNA SPEC QL NAA+PROBE: NOT DETECTED
HCOV HKU1 RNA SPEC QL NAA+PROBE: NOT DETECTED
HCOV NL63 RNA SPEC QL NAA+PROBE: NOT DETECTED
HCOV OC43 RNA SPEC QL NAA+PROBE: NOT DETECTED
HMPV RNA SPEC QL NAA+PROBE: NOT DETECTED
HPIV1 RNA SPEC QL NAA+PROBE: NOT DETECTED
HPIV2 RNA SPEC QL NAA+PROBE: NOT DETECTED
HPIV3 RNA SPEC QL NAA+PROBE: NOT DETECTED
HPIV4 RNA SPEC QL NAA+PROBE: NOT DETECTED
M PNEUMO DNA SPEC QL NAA+PROBE: NOT DETECTED
RSV RNA SPEC QL NAA+PROBE: NOT DETECTED
RV+EV RNA SPEC QL NAA+PROBE: NOT DETECTED
SARS-COV-2 PCR, COVPCR: NOT DETECTED

## 2022-05-10 PROCEDURE — 0202U NFCT DS 22 TRGT SARS-COV-2: CPT

## 2022-05-10 PROCEDURE — APPNB15 APP NON BILLABLE TIME 0-15 MINS: Performed by: NURSE PRACTITIONER

## 2022-05-10 PROCEDURE — G0378 HOSPITAL OBSERVATION PER HR: HCPCS

## 2022-05-10 PROCEDURE — 74011250637 HC RX REV CODE- 250/637: Performed by: HOSPITALIST

## 2022-05-10 RX ADMIN — BUSPIRONE HYDROCHLORIDE 10 MG: 10 TABLET ORAL at 07:56

## 2022-05-10 RX ADMIN — VENLAFAXINE HYDROCHLORIDE 150 MG: 150 CAPSULE, EXTENDED RELEASE ORAL at 07:55

## 2022-05-10 RX ADMIN — FENOFIBRATE 48 MG: 48 TABLET ORAL at 07:57

## 2022-05-10 RX ADMIN — BUPROPION HYDROCHLORIDE 150 MG: 150 TABLET, EXTENDED RELEASE ORAL at 07:57

## 2022-05-10 RX ADMIN — LEVOTHYROXINE SODIUM 75 MCG: 0.07 TABLET ORAL at 06:11

## 2022-05-10 RX ADMIN — ATORVASTATIN CALCIUM 80 MG: 20 TABLET, FILM COATED ORAL at 07:56

## 2022-05-10 RX ADMIN — PANTOPRAZOLE SODIUM 40 MG: 40 TABLET, DELAYED RELEASE ORAL at 06:11

## 2022-05-10 NOTE — PROGRESS NOTES
Care Management follow up    Patient admitted for syncope, posterior headache. Patient resides at [facility] care. History of: dementia, tremors, depression, anxiety, lumbar spondylosis. RUR observation (Score %) low   Is This a Readmission NO  Is this a Bundle NO    Current status  Patient discussed during interdisciplinary rounds. Patient continues to require medical management including ongoing assessment and monitoring. Alert but confused today, sitter at bedside until this am. Cardiology and neurology following. Possible discharge tomorrow if patient remains sitter-free. Transition of Care Plan  1. Monitor patient status and response to treatment. 2. Patient continues to require medical management. 3. Patient to return to [facility] unit at [facility]. 4. COVID PCR collected today. 5. Patient must be sitter-free for 24 hours to return to [facility]. 6. Cardiac event monitor to be placed prior to discharge. 7. Patient's son to transport at [facility], lives one hour away so will need notice of DC. 8. CM to monitor progress and recommendations.     Shira Wang, RN, MSN/Care manager

## 2022-05-10 NOTE — PROGRESS NOTES
Stanley Katz amarjit [place] 77  [address] [facility], [place], [place], [address] [place] [address]  [phone]      Medical Progress Note      NAME: Serjio Oquendo   :  1945  MRM:  [id number]    Date/Time of service: 5/10/2022  1:13 PM       Subjective:     Chief Complaint:  Patient was personally seen and examined by me during this time period. Chart reviewed. Patient is in bed with no acute distress, no major event reported overnight does not seem to be having any fevers no chills no nausea no vomiting, comfortable bed       Objective:       Vitals:       Last 24hrs VS reviewed since prior progress note.  Most recent are:    Visit Vitals  /71 (BP 1 Location: Right arm, BP Patient Position: At rest)   Pulse 63   Temp 98 °F (36.7 °C)   Resp 16   Ht 5' 2\" (1.575 m)   Wt 71.2 kg (156 lb 15.5 oz)   SpO2 95%   BMI 28.71 kg/m²     SpO2 Readings from Last 6 Encounters:   05/10/22 95%   21 93%   21 99%   21 96%   20 97%   20 97%            Intake/Output Summary (Last 24 hours) at 5/10/2022 1313  Last data filed at 5/10/2022 1052  Gross per 24 hour   Intake 730 ml   Output 1550 ml   Net -820 ml        Exam:     Physical Exam:    Gen:  Well-developed, well-nourished, in no acute distress  HEENT:  Pink conjunctivae, PERRL, hearing intact to voice, moist mucous membranes  Neck:  Supple, without masses, thyroid non-tender  Resp:  No accessory muscle use, clear breath sounds without wheezes rales or rhonchi  Card:  No murmurs, normal S1, S2 without thrills, bruits or peripheral edema  Abd:  Soft, non-tender, non-distended, normoactive bowel sounds are present,  Musc:  No cyanosis or clubbing  Skin:  No rashes or ulcers, skin turgor is good  Neuro:  Cranial nerves 3-12 are grossly intact,  strength is 5/5 bilaterally  Psych:  Good insight, oriented to person, place and time, alert      Medications Reviewed: (see below)    Lab Data Reviewed: (see below)    ______________________________________________________________________    Medications:     Current Facility-Administered Medications   Medication Dose Route Frequency    atorvastatin (LIPITOR) tablet 80 mg  80 mg Oral DAILY    buPROPion XL (WELLBUTRIN XL) tablet 150 mg  150 mg Oral DAILY    busPIRone (BUSPAR) tablet 10 mg  10 mg Oral DAILY    levothyroxine (SYNTHROID) tablet 75 mcg  75 mcg Oral 6am    pantoprazole (PROTONIX) tablet 40 mg  40 mg Oral ACB    venlafaxine-SR (EFFEXOR-XR) capsule 150 mg  150 mg Oral DAILY WITH BREAKFAST    melatonin tablet 3 mg  3 mg Oral QHS PRN    fenofibrate nanocrystallized (TRICOR) tablet 48 mg  48 mg Oral DAILY          Lab Review:     Recent Labs     05/09/22  0847 05/08/22  1232   WBC 5.0 5.3   HGB 12.6 12.0   HCT 39.1 38.0    248     Recent Labs     05/09/22  0408 05/08/22  1232    140   K 4.0 4.1   * 108   CO2 25 27   * 97   BUN 13 15   CREA 0.84 1.04*   CA 8.9 9.0   ALB 3.3* 3.6   TBILI 0.4 0.6   ALT 24 27   INR  --  1.1     Lab Results   Component Value Date/Time    Glucose (POC) 187 (H) 05/08/2022 08:56 PM    Glucose (POC) 100 05/08/2022 12:32 PM          Assessment / Plan:      Active Problems:    Syncope (5/8/2022)    1 syncope that seems to be recurring, patient was seen by cardiology who recommends a event monitor for 30 days with a TTE unremarkable, can be discharged with this event monitor, patient has had a previous EEG done previously which was unremarkable, neurology  consulted repeat EEG unremarkable, echocardiogram unremarkable, commands outpatient follow-up no further work-up, CT scan of the head negative, other Dopplers unremarkable, likely be discharged in the next 24 hours, COVID PCR pending, dc Sitter, discharge in a.m.     2 history of anxiety depression stable seems to be around baseline status     3 history of tremors no history of Parkinson disease follows on an outpatient basis with neurology stable for now    4 discussed CODE STATUS with son who claims patient is a DNI DNR, seems to be medical power of  changes in CODE STATUS done                 Care Plan discussed with: Patient    Discussed:  Care Plan    Prophylaxis:  Lovenox    Disposition:  SNF/LTC           ___________________________________________________    Attending Physician: Taya Barfield MD

## 2022-05-10 NOTE — PROGRESS NOTES
Spiritual Care Assessment/Progress Note  [address]      NAME: Modesto Roy      MRN: [id number]  AGE: 64 y.o.  SEX: female  Jainism Affiliation: No Holiness   Language: English     5/10/2022     Total Time (in minutes): 60     Spiritual Assessment begun in [facility] [address] through conversation with:         [x]Patient        [] Family    [] Friend(s)        Reason for Consult: Initial/Spiritual assessment, patient floor     Spiritual beliefs: (Please include comment if needed)     [x] Identifies with a yesi tradition:         [] Supported by a yesi community:            [] Claims no spiritual orientation:           [] Seeking spiritual identity:                [] Adheres to an individual form of spirituality:           [] Not able to assess:                           Identified resources for coping:      [x] Prayer                               [x] Music                  [] Guided Imagery     [x] Family/friends                 [] Pet visits     [] Devotional reading                         [] Unknown     [] Other:                                             Interventions offered during this visit: (See comments for more details)    Patient Interventions: Affirmation of emotions/emotional suffering,Affirmation of yesi,Zoroastrian/blessing,Catharsis/review of pertinent events in supportive environment,Guidance concerning next steps/process to be expected,Initial/Spiritual assessment, patient floor,Life review/legacy,Normalization of emotional/spiritual concerns,Prayer (assurance of),Prayer (actual),Jainism beliefs/image of God discussed,Issues around forgiveness/closure           Plan of Care:     [] Support spiritual and/or cultural needs    [] Support AMD and/or advance care planning process      [] Support grieving process   [] Coordinate Rites and/or Rituals    [] Coordination with community clergy   [] No spiritual needs identified at this time   [] Detailed Plan of Care below (See Comments)  [] Make referral to Music Therapy  [] Make referral to Pet Therapy     [] Make referral to Addiction services  [] Make referral to [facility]  [] Make referral to Spiritual Care Partner  [] No future visits requested        [x] Contact Spiritual Care for further referrals     Comments:   Spiritual care assessment with Ms. Jeffery Poster on [facility] unit. After reviewing chart, stepped into the room as she was getting out of the bed, she is a fall risk and alarm went off. Nurses came to tend to her. Afterwards,  was then able to engage in conversation. She warmly welcomed  and engaged in life review, expressing that she has 3 adult children, 2 daughters and a son. She shared about their lives. She is aware that she cannot remember things and deals with confusion. Upon her saying that, she was asked how that makes her feels and how she alaina with these challenges. She recalled that she is a [employer], that she sang in the choir, that she \"was good,\" but now she expressed feeling like she has lost the way and not a good person like she used to be. We talked about her beliefs about God and forgiveness. We also discussed about finding hope in the future, for she said earlier that she doesn't see any. She asked for prayer, and afterwards, she was smiling and expressing leona. She recalled the [employer] singing group, so I put that gospel music on for her with the room computer and she was crying with leona, loving it when I left.      Chaplain Nadege Ayers M.Div.  Cally Man ([id number])

## 2022-05-10 NOTE — PROGRESS NOTES
Problem: Falls - Risk of  Goal: *Absence of Falls  Description: Document Alice Enriquez Fall Risk and appropriate interventions in the flowsheet.   Outcome: Progressing Towards Goal  Note: Fall Risk Interventions:  Mobility Interventions: Bed/chair exit alarm    Mentation Interventions: Bed/chair exit alarm    Medication Interventions: Bed/chair exit alarm    Elimination Interventions: Bed/chair exit alarm,Call light in reach    History of Falls Interventions: Bed/chair exit alarm         Problem: Syncope  Goal: *Absence of injury  Outcome: Progressing Towards Goal

## 2022-05-10 NOTE — PROGRESS NOTES
Chart reviewed  ECHO wnl    Needs event monitor at d/c.   Follow-up Information     Follow up With Specialties Details Why Contact Junaid Damico NP Nurse Practitioner On 6/20/2022 1 pm  [address] [place] [address] Avenue  [address] [place] [address] [phone]

## 2022-05-10 NOTE — PROGRESS NOTES
Problem: Falls - Risk of  Goal: *Absence of Falls  Description: Document Shannon Mcburney Fall Risk and appropriate interventions in the flowsheet.   Outcome: Progressing Towards Goal  Note: Fall Risk Interventions:  Mobility Interventions: Bed/chair exit alarm,Communicate number of staff needed for ambulation/transfer,Patient to call before getting OOB    Mentation Interventions: Bed/chair exit alarm,Adequate sleep, hydration, pain control         Elimination Interventions: Call light in reach,Bed/chair exit alarm,Stay With Me (per policy),Toileting schedule/hourly rounds    History of Falls Interventions: Bed/chair exit alarm         Problem: Patient Education: Go to Patient Education Activity  Goal: Patient/Family Education  Outcome: Progressing Towards Goal     Problem: Syncope  Goal: *Absence of injury  Outcome: Progressing Towards Goal     Problem: Patient Education: Go to Patient Education Activity  Goal: Patient/Family Education  Outcome: Progressing Towards Goal

## 2022-05-10 NOTE — PROGRESS NOTES
Cardiac monitor ordered. Unit is charging. Monitor can be placed anytime day of discharge. Please contact EKG allowing at least one hour lead time. . Call EKG ext [phone].

## 2022-05-10 NOTE — PROGRESS NOTES
0700    Bedside and Verbal shift change report given to April, RN (oncoming nurse) by Dania Naranjo RN (offgoing nurse). Report included the following information SBAR, Kardex, ED Summary, Intake/Output, MAR, Recent Results and Cardiac Rhythm NSR. This patient was assisted with Intentional Toileting every 2 hours during this shift as appropriate. Documentation of ambulation and output reflected on Flowsheet as appropriate. Purposeful hourly rounding was completed using AIDET and 5Ps. Outcomes of PHR documented as they occurred. Bed alarm in use as appropriate. Dual Suction and ambubag in place.

## 2022-05-10 NOTE — PROGRESS NOTES
0730 Bedside and Verbal shift change report given to April RN (oncoming nurse) by Stephen Main RN (offgoing nurse). Report included the following information SBAR and Kardex. This patient was assisted with Intentional Toileting every 2 hours during this shift as appropriate. Documentation of ambulation and output reflected on Flowsheet as appropriate. Purposeful hourly rounding was completed using AIDET and 5Ps. Outcomes of PHR documented as they occurred. Bed alarm in use as appropriate. Dual Suction and ambubag in place. 0900 sitter no longer in room with patient. Dr Fadi Pickett on unit, reminded DNR paperwork in chart but need order for patient to be a DNR.    1900 Patient has gotten out of bed 5 times. Redirects easily, but confused. Bed alarm sounding each time. 1930 Bedside and Verbal shift change report given to Emmett Velez RN (oncoming nurse) by April RN (offgoing nurse). Report included the following information SBAR and Kardex.

## 2022-05-11 VITALS
HEART RATE: 63 BPM | HEIGHT: 62 IN | TEMPERATURE: 97.8 F | WEIGHT: 156.97 LBS | DIASTOLIC BLOOD PRESSURE: 67 MMHG | BODY MASS INDEX: 28.89 KG/M2 | SYSTOLIC BLOOD PRESSURE: 122 MMHG | RESPIRATION RATE: 16 BRPM | OXYGEN SATURATION: 98 %

## 2022-05-11 LAB
APPEARANCE UR: CLEAR
BACTERIA URNS QL MICRO: NEGATIVE /HPF
BILIRUB UR QL: NEGATIVE
COLOR UR: ABNORMAL
EPITH CASTS URNS QL MICRO: ABNORMAL /LPF
GLUCOSE UR STRIP.AUTO-MCNC: NEGATIVE MG/DL
HGB UR QL STRIP: NEGATIVE
HYALINE CASTS URNS QL MICRO: ABNORMAL /LPF (ref 0–2)
KETONES UR QL STRIP.AUTO: NEGATIVE MG/DL
LEUKOCYTE ESTERASE UR QL STRIP.AUTO: NEGATIVE
NITRITE UR QL STRIP.AUTO: NEGATIVE
PH UR STRIP: 7 [PH] (ref 5–8)
PROT UR STRIP-MCNC: NEGATIVE MG/DL
RBC #/AREA URNS HPF: ABNORMAL /HPF (ref 0–5)
SP GR UR REFRACTOMETRY: 1.01 (ref 1–1.03)
UROBILINOGEN UR QL STRIP.AUTO: 1 EU/DL (ref 0.2–1)
WBC URNS QL MICRO: ABNORMAL /HPF (ref 0–4)

## 2022-05-11 PROCEDURE — 81001 URINALYSIS AUTO W/SCOPE: CPT

## 2022-05-11 PROCEDURE — G0378 HOSPITAL OBSERVATION PER HR: HCPCS

## 2022-05-11 PROCEDURE — 87086 URINE CULTURE/COLONY COUNT: CPT

## 2022-05-11 PROCEDURE — 74011250637 HC RX REV CODE- 250/637: Performed by: HOSPITALIST

## 2022-05-11 RX ADMIN — BUSPIRONE HYDROCHLORIDE 10 MG: 10 TABLET ORAL at 09:26

## 2022-05-11 RX ADMIN — VENLAFAXINE HYDROCHLORIDE 150 MG: 150 CAPSULE, EXTENDED RELEASE ORAL at 09:26

## 2022-05-11 RX ADMIN — LEVOTHYROXINE SODIUM 75 MCG: 0.07 TABLET ORAL at 07:10

## 2022-05-11 RX ADMIN — FENOFIBRATE 48 MG: 48 TABLET ORAL at 09:26

## 2022-05-11 RX ADMIN — ATORVASTATIN CALCIUM 80 MG: 20 TABLET, FILM COATED ORAL at 09:26

## 2022-05-11 RX ADMIN — PANTOPRAZOLE SODIUM 40 MG: 40 TABLET, DELAYED RELEASE ORAL at 07:10

## 2022-05-11 RX ADMIN — BUPROPION HYDROCHLORIDE 150 MG: 150 TABLET, EXTENDED RELEASE ORAL at 09:26

## 2022-05-11 NOTE — PROGRESS NOTES
[facility]    Medical Progress Note      NAME: Yaneth Cerda   :  1945  MRM:  [id number]    Date/Time of service 2022  11:30 AM          Assessment and Plan:     Syncope - POA, recurrent, unclear etiology. No sign of CVA. Tele normal here. EEG normal. ECHO normal. No sign of infection, but check UA. Cardiology consulted. Polypharmacy? Autonomic instability? Holter planned. DC home. Late onset Alzheimer's disease without behavioral disturbance / Polypharmacy / Tremors / Anxiety and Depression - Continue buproprion, buspirone, venlafaxine and melatonin. DM (diabetes mellitus) type 2 without complications - POA. BG good. Given age, good glucose, and hx of syncope, stop [place] and [place], stop all testing and treatment. BERTRAM on CPAP - Continue home CPAP    Hypothyroid - TSH normal.  Continue synthroid. HLD (hyperlipidemia) - Continue atorvastatin and tricor    Incontinence of urine in female - Check UA. Not on meds    OA (osteoarthritis) - Tylenol prn       Subjective:     Chief Complaint:  Feels okay    ROS:  (bold if positive, if negative)    Tolerating PT  Tolerating Diet        Objective:     Last 24hrs VS reviewed since prior progress note.  Most recent are:    Visit Vitals  /67 (BP 1 Location: Left upper arm, BP Patient Position: At rest;Semi fowlers)   Pulse 63   Temp 97.8 °F (36.6 °C)   Resp 16   Ht 5' 2\" (1.575 m)   Wt 71.2 kg (156 lb 15.5 oz)   SpO2 98%   BMI 28.71 kg/m²     SpO2 Readings from Last 6 Encounters:   22 98%   21 93%   21 99%   21 96%   20 97%   20 97%            Intake/Output Summary (Last 24 hours) at 2022 1132  Last data filed at 2022 0859  Gross per 24 hour   Intake 730 ml   Output 500 ml   Net 230 ml        Physical Exam:    Gen:  Well-developed, well-nourished, in no acute distress  HEENT:  Pink conjunctivae, PERRL, hearing intact to voice, moist mucous membranes  Neck:  Supple, without masses, thyroid non-tender  Resp:  No accessory muscle use, clear breath sounds without wheezes rales or rhonchi  Card:  No murmurs, normal S1, S2 without thrills, bruits or peripheral edema  Abd:  Soft, non-tender, non-distended, normoactive bowel sounds are present, no mass  Lymph:  No cervical or inguinal adenopathy  Musc:  No cyanosis or clubbing  Skin:  No rashes or ulcers, skin turgor is good  Neuro:  Cranial nerves are grossly intact, no focal motor weakness, follows commands appropriately  Psych:  Good insight, oriented to person, place and time, alert    Telemetry reviewed:   normal sinus rhythm  __________________________________________________________________  Medications Reviewed: (see below)  Medications:     Current Facility-Administered Medications   Medication Dose Route Frequency    atorvastatin (LIPITOR) tablet 80 mg  80 mg Oral DAILY    buPROPion XL (WELLBUTRIN XL) tablet 150 mg  150 mg Oral DAILY    busPIRone (BUSPAR) tablet 10 mg  10 mg Oral DAILY    levothyroxine (SYNTHROID) tablet 75 mcg  75 mcg Oral 6am    pantoprazole (PROTONIX) tablet 40 mg  40 mg Oral ACB    venlafaxine-SR (EFFEXOR-XR) capsule 150 mg  150 mg Oral DAILY WITH BREAKFAST    melatonin tablet 3 mg  3 mg Oral QHS PRN    fenofibrate nanocrystallized (TRICOR) tablet 48 mg  48 mg Oral DAILY        Lab Data Reviewed: (see below)  Lab Review:     Recent Labs     05/09/22  0847 05/08/22  1232   WBC 5.0 5.3   HGB 12.6 12.0   HCT 39.1 38.0    248     Recent Labs     05/09/22  0408 05/08/22  1232    140   K 4.0 4.1   * 108   CO2 25 27   * 97   BUN 13 15   CREA 0.84 1.04*   CA 8.9 9.0   ALB 3.3* 3.6   TBILI 0.4 0.6   ALT 24 27   INR  --  1.1     Lab Results   Component Value Date/Time    Glucose (POC) 187 (H) 05/08/2022 08:56 PM    Glucose (POC) 100 05/08/2022 12:32 PM     No results for input(s): PH, PCO2, PO2, HCO3, FIO2 in the last 72 hours.   Recent Labs     05/08/22  1232   INR 1.1     All Micro Results Procedure Component Value Units Date/Time    CULTURE, URINE [[id number]]     Order Status: Sent Specimen: Urine from Clean catch     RESPIRATORY VIRUS PANEL W/COVID-19, PCR [[id number]] Collected: 05/10/22 0915    Order Status: Completed Specimen: Nasopharyngeal Updated: 05/10/22 1625     Adenovirus Not detected        Coronavirus 229E Not detected        Coronavirus HKU1 Not detected        Coronavirus CVNL63 Not detected        Coronavirus OC43 Not detected        SARS-CoV-2, PCR Not detected        Metapneumovirus Not detected        Rhinovirus and Enterovirus Not detected        Influenza A Not detected        Influenza A, subtype H1 Not detected        Influenza A, subtype H3 Not detected        INFLUENZA A H1N1 PCR Not detected        Influenza B Not detected        Parainfluenza 1 Not detected        Parainfluenza 2 Not detected        Parainfluenza 3 Not detected        Parainfluenza virus 4 Not detected        RSV by PCR Not detected        B. parapertussis, PCR Not detected        Bordetella pertussis - PCR Not detected        Chlamydophila pneumoniae DNA, QL, PCR Not detected        Mycoplasma pneumoniae DNA, QL, PCR Not detected             Other pertinent lab: none    Total time spent with patient: 30 Minutes I personally reviewed chart, notes, data and current medications in the medical record. I have personally examined and treated the patient at bedside during this period.                  Care Plan discussed with: Patient, Care Manager, Nursing Staff, Consultant/Specialist and >50% of time spent in counseling and coordination of care    Discussed:  Care Plan and D/C Planning    Prophylaxis:  H2B/PPI    Disposition:  Home w/Family           ___________________________________________________    Attending Physician: Nisha Porter MD

## 2022-05-11 NOTE — DISCHARGE SUMMARY
Physician Discharge Summary     Patient ID:  Arabella John  [id number]  64 y.o.  1945    Admit date: 5/8/2022    Discharge date of service and time: 5/11/2022    Admission Diagnoses: Syncope [R55]    Discharge Diagnoses:    Principal Diagnosis   <principal problem not specified>                                             Hospital Course and other diagnoses  Syncope - POA, recurrent, unclear etiology. No sign of CVA. Tele normal here. EEG normal. ECHO normal. No sign of infection, but check UA. Cardiology consulted. Polypharmacy? Autonomic instability? Holter planned. DC home.      Late onset Alzheimer's disease without behavioral disturbance / Polypharmacy / Tremors / Anxiety and Depression - Continue aricept, buproprion, buspirone, venlafaxine and melatonin.     DM (diabetes mellitus) type 2 without complications - POA. BG good. Given age, good glucose, and hx of syncope, stop [place], stop all testing and treatment.     BERTRAM on CPAP - Continue home CPAP     Hypothyroid - TSH normal.  Continue synthroid.     HLD (hyperlipidemia) - Continue atorvastatin and tricor     Incontinence of urine in female - Check UA. Not on meds     OA (osteoarthritis) - Tylenol prn     PCP: Franco Soares NP    Consults: Cardiology    Significant Diagnostic Studies: See Hospital Course    Discharged home in improved condition.     Discharge Exam:  /67 (BP 1 Location: Left upper arm, BP Patient Position: At rest;Semi fowlers)   Pulse 63   Temp 97.8 °F (36.6 °C)   Resp 16   Ht 5' 2\" (1.575 m)   Wt 71.2 kg (156 lb 15.5 oz)   SpO2 98%   BMI 28.71 kg/m²      Gen:  Well-developed, well-nourished, in no acute distress  HEENT:  Pink conjunctivae, PERRL, hearing intact to voice, moist mucous membranes  Neck:  Supple, without masses, thyroid non-tender  Resp:  No accessory muscle use, clear breath sounds without wheezes rales or rhonchi  Card:  No murmurs, normal S1, S2 without thrills, bruits or peripheral edema  Abd: Soft, non-tender, non-distended, normoactive bowel sounds are present, no mass  Lymph:  No cervical or inguinal adenopathy  Musc:  No cyanosis or clubbing  Skin:  No rashes or ulcers, skin turgor is good  Neuro:  Cranial nerves are grossly intact, no focal motor weakness, follows commands appropriately  Psych:  Good insight, oriented to person, place and time, alert    Patient Instructions:   Current Discharge Medication List      CONTINUE these medications which have NOT CHANGED    Details   busPIRone (BUSPAR) 10 mg tablet Take 10 mg by mouth daily. melatonin 5 mg tablet Take 10 mg by mouth nightly. Indications: sleep      acetaminophen (TylenoL) 325 mg tablet Take 650 mg by mouth every four (4) hours as needed for Pain. atorvastatin (LIPITOR) 80 mg tablet Take 1 Tab by mouth daily. Qty: 90 Tab, Refills: 3    Comments: Dose adjustment, please discontinue atorvastatin 40 mg. Associated Diagnoses: Mixed hyperlipidemia      venlafaxine-SR (EFFEXOR-XR) 150 mg capsule Take 1 capsule by mouth daily. Qty: 90 Cap, Refills: 1    Associated Diagnoses: Depression, unspecified depression type      buPROPion XL (WELLBUTRIN XL) 150 mg tablet Take 1 tablet by mouth daily  Qty: 90 Tab, Refills: 1    Associated Diagnoses: Depression, unspecified depression type      fenofibric acid (TRILIPIX ER) 135 mg capsule Take 1 capsule by mouth daily. Qty: 90 Cap, Refills: 1    Associated Diagnoses: Mixed hyperlipidemia      levothyroxine (SYNTHROID) 75 mcg tablet Take 1 Tab by mouth Daily (before breakfast). Qty: 90 Tab, Refills: 1    Associated Diagnoses: Hypothyroidism, unspecified type      celecoxib (CELEBREX) 200 mg capsule Take 1 Cap by mouth daily. Indications: joint damage causing pain and loss of function  Qty: 90 Cap, Refills: 1    Associated Diagnoses: Primary osteoarthritis involving multiple joints      omeprazole (PRILOSEC) 40 mg capsule Take 1 Cap by mouth daily.   Qty: 90 Cap, Refills: 1    Associated Diagnoses: Gastroesophageal reflux disease without esophagitis      donepeziL (ARICEPT) 5 mg tablet Take 1 tablet by mouth nightly for mild to moderate Alzheimer's type Dementia. Qty: 90 Tab, Refills: 1    Associated Diagnoses: Mild dementia ([address]         STOP taking these medications       SITagliptin (Januvia) 25 mg tablet Comments:   Reason for Stopping:             Activity: Activity as tolerated  Diet: Cardiac Diet, Diabetic Diet and Low fat, Low cholesterol  Wound Care: None needed    Follow-up with your PCP and cardiology in a few weeks.   Follow-up tests/labs - none    Signed:  Sabino Daniels MD  5/11/2022  11:40 AM

## 2022-05-11 NOTE — PROGRESS NOTES
Went over discharge instructions with patient. No noted complaints. NO signs of distress. Pt alert and oriented x4. No additional questions.  Pt to call out once ride has arrived

## 2022-05-11 NOTE — ROUTINE PROCESS
1900 - Bedside shift change report given to Coral Henry RN (oncoming nurse) by DEBBIE Granger (offgoing nurse). Report included the following information SBAR, Kardex, Intake/Output, MAR, Accordion and Recent Results. Chief [facility] History: Dementia, HTN, Tremors, Anxiety, Depression, CKD, HLD, BERTRAM, Hypothyroid     Code Status: DNR     Isolation: N/A     Diet: Regular    IV Access: 20G L AC    Spears: No.    Restraints: No.    Central Line: No.     Oxygen: Room Air     Mobility: x1 to BR     Significant Results: EEG negative; Echo: 55-60% EF, CT: negative     Consults: Cardiology, Neurology     Plan of Care: D/C to [address] [place] [address] 5/11; Needs Holter Monitor before she goes - Call EKG one hour prior to D/C    Family at Bedside: No.       2248 - TRANSFER - OUT REPORT:    Verbal report given to DEBBIE Brock (name) on [facility]  being transferred to [facility] (unit) for routine progression of care       Report consisted of patients Situation, Background, Assessment and   Recommendations(SBAR). Information from the following report(s) SBAR, Kardex, Intake/Output, MAR, Accordion and Recent Results was reviewed with the receiving nurse. Lines:   Peripheral IV 05/08/22 Left Antecubital (Active)   Site Assessment Clean, dry, & intact 05/10/22 1925   Phlebitis Assessment 0 05/10/22 1925   Infiltration Assessment 0 05/10/22 1925   Dressing Status Clean, dry, & intact 05/10/22 1925   Dressing Type Transparent;Tape 05/10/22 1925   Hub Color/Line Status Pink;Capped 05/10/22 1925   Action Taken Open ports on tubing capped 05/10/22 1925   Alcohol Cap Used Yes 05/10/22 1925        Opportunity for questions and clarification was provided.       Patient transported with:   Registered Nurse

## 2022-05-11 NOTE — PROGRESS NOTES
TRANSFER - OUT REPORT:    Verbal report given to Mely Rendon (name) on Faith Jay (patient name)  being transferred to [address] [place] [address] (unit) for routine progression of care   Report consisted of patients Situation, Background, Assessment and   Recommendations(SBAR).      Anne Sanchez, RN, MSN/Care manager  [phone]

## 2022-05-11 NOTE — PROGRESS NOTES
5/11/2022 12:21 PM CM called back to pt's son, Curtis Salas who confirmed he will be to [facility] at 2:30PM to transport pt(orginially set for 1:30 but moved back an hour due to UA needing to be collected prior to discharge). Pt's RN is aware of discharge timing. CM also called and lvm with EKG extension at [phone], notifying pt will discharge this afternoon. 5/11/2022 11:43 AM Pt to discharge today back to [facility] today. CM spoke with Tuan at [facility] who confirmed they can accept pt back today. Tuan requested report be called to [phone] prior to pt's discharge. CM called to pt's son, Curtis Salas at [phone] to notify of pt's discharge today, had to [facility] requesting return phone call. CM faxed pt's discharge summary, COVID test and updates to [facility] at [phone]. CM will follow up. Wilmer James BSW     Care Management Interventions  PCP Verified by CM: Yes  Mode of Transport at Discharge:  Other (see comment)  Transition of Care Consult (CM Consult): Discharge Planning  Support Systems: Child(cinthya),Assisted Living  Confirm Follow Up Transport: Family  Discharge Location  Patient Expects to be Discharged to[de-identified] Assisted Living

## 2022-05-11 NOTE — PROGRESS NOTES
Cardiac monitor ordered. Unit is charging.     Monitor can be placed anytime day of discharge. Please contact EKG allowing at least one hour lead time. . Call EKG ext [phone].

## 2022-05-11 NOTE — DISCHARGE INSTRUCTIONS
Patient Discharge Instructions    Dalia Silverman / [id number] : 1945    Admitted 2022 Discharged: 2022     Primary Diagnoses  Problem List as of 2022 Date Reviewed: 2021           Urinary incontinence   Hypercholesterolemia   DM (diabetes mellitus) ([facility]   Syncope   Late onset Alzheimer's disease without behavioral disturbance ([facility]   BERTRAM on CPAP   Hypothyroid   HLD (hyperlipidemia)   Depression   Anxiety   Incontinence of urine in female   OA (osteoarthritis)          Take Home Medications     · It is important that you take the medication exactly as they are prescribed. · Keep your medication in the bottles provided by the pharmacist and keep a list of the medication names, dosages, and times to be taken in your wallet. · Do not take other medications without consulting your doctor. What to do at Home    Recommended diet: Cardiac Diet, Diabetic Diet and Low fat, Low cholesterol    Recommended activity: Activity as tolerated    If you experience worse symptoms, please follow up with cardiology. Follow-up with your PCP in a few weeks    Follow-up Information     Follow up With Specialties Details Why Contact Info    Jose Carlos Prado NP Nurse Practitioner On 2022 1 pm   [place] [phone]      Mukesh Agee NP Nurse Practitioner   [address] [place] [address]  [phone]             Information obtained by :  I understand that if any problems occur once I am at home I am to contact my physician. I understand and acknowledge receipt of the instructions indicated above.                                                                                                                                            Physician's or R.N.'s Signature                                                                  Date/Time Patient or Representative Signature                                                          Date/Time

## 2022-05-12 LAB
BACTERIA SPEC CULT: NORMAL
SERVICE CMNT-IMP: NORMAL

## 2022-06-23 ENCOUNTER — TELEPHONE (OUTPATIENT)
Dept: CARDIOLOGY CLINIC | Age: 77
End: 2022-06-23

## 2022-06-23 NOTE — TELEPHONE ENCOUNTER
Placed a call to patient, patient number on file is non working. Placed a call to patient son, left a message to have patient return call.

## 2022-06-23 NOTE — TELEPHONE ENCOUNTER
Recent loop results - ordered 5/11/22-6/9/22 for eval of syncope - (only obtained 5hr of data) - no sig arrhythmias noted.  Patient monitored for 5h 3m   2 events were transmitted. 1 patient triggered; 1 auto triggered   100 PACs with PAC burden of <1%   PVCs were not found during the monitoring period    I recommend increased monitoring time and fu appt. Missed appt 6/20/22 - will attempt to reschedule.

## 2023-05-29 RX ORDER — OMEPRAZOLE 40 MG/1
40 CAPSULE, DELAYED RELEASE ORAL DAILY
COMMUNITY
Start: 2021-01-28

## 2023-05-29 RX ORDER — LEVOTHYROXINE SODIUM 0.07 MG/1
TABLET ORAL
COMMUNITY
Start: 2021-01-28

## 2023-05-29 RX ORDER — BUPROPION HYDROCHLORIDE 150 MG/1
1 TABLET ORAL DAILY
COMMUNITY
Start: 2021-01-28

## 2023-05-29 RX ORDER — DONEPEZIL HYDROCHLORIDE 5 MG/1
TABLET, FILM COATED ORAL
COMMUNITY
Start: 2020-12-18

## 2023-05-29 RX ORDER — CELECOXIB 200 MG/1
200 CAPSULE ORAL DAILY
COMMUNITY
Start: 2021-01-28

## 2023-05-29 RX ORDER — VENLAFAXINE HYDROCHLORIDE 150 MG/1
1 CAPSULE, EXTENDED RELEASE ORAL DAILY
COMMUNITY
Start: 2021-01-28

## 2023-05-29 RX ORDER — ATORVASTATIN CALCIUM 80 MG/1
80 TABLET, FILM COATED ORAL DAILY
COMMUNITY
Start: 2021-01-28

## 2023-05-29 RX ORDER — CHOLECALCIFEROL (VITAMIN D3) 125 MCG
10 CAPSULE ORAL
COMMUNITY

## 2023-05-29 RX ORDER — BUSPIRONE HYDROCHLORIDE 10 MG/1
10 TABLET ORAL DAILY
COMMUNITY

## 2023-05-29 RX ORDER — ACETAMINOPHEN 325 MG/1
650 TABLET ORAL EVERY 4 HOURS PRN
COMMUNITY

## 2023-07-09 ENCOUNTER — HOSPITAL ENCOUNTER (INPATIENT)
Facility: HOSPITAL | Age: 78
LOS: 3 days | Discharge: NURSING, REHAB OR LONG-TERM CARE | DRG: 069 | End: 2023-07-14
Attending: EMERGENCY MEDICINE | Admitting: INTERNAL MEDICINE
Payer: MEDICARE

## 2023-07-09 ENCOUNTER — APPOINTMENT (OUTPATIENT)
Facility: HOSPITAL | Age: 78
DRG: 069 | End: 2023-07-09
Payer: MEDICARE

## 2023-07-09 ENCOUNTER — APPOINTMENT (OUTPATIENT)
Facility: HOSPITAL | Age: 78
DRG: 069 | End: 2023-07-09
Attending: EMERGENCY MEDICINE
Payer: MEDICARE

## 2023-07-09 DIAGNOSIS — G45.9 TIA (TRANSIENT ISCHEMIC ATTACK): Primary | ICD-10-CM

## 2023-07-09 PROBLEM — R29.810 FACIAL DROOP: Status: ACTIVE | Noted: 2023-07-09

## 2023-07-09 LAB
ALBUMIN SERPL-MCNC: 3.4 G/DL (ref 3.5–5)
ALBUMIN/GLOB SERPL: 1.2 (ref 1.1–2.2)
ALP SERPL-CCNC: 60 U/L (ref 45–117)
ALT SERPL-CCNC: 19 U/L (ref 12–78)
ANION GAP SERPL CALC-SCNC: 6 MMOL/L (ref 5–15)
AST SERPL-CCNC: 22 U/L (ref 15–37)
BASOPHILS # BLD: 0.1 K/UL (ref 0–0.1)
BASOPHILS NFR BLD: 1 % (ref 0–1)
BILIRUB SERPL-MCNC: 0.4 MG/DL (ref 0.2–1)
BUN SERPL-MCNC: 17 MG/DL (ref 6–20)
BUN/CREAT SERPL: 18 (ref 12–20)
CALCIUM SERPL-MCNC: 9.2 MG/DL (ref 8.5–10.1)
CHLORIDE SERPL-SCNC: 109 MMOL/L (ref 97–108)
CHOLEST SERPL-MCNC: 184 MG/DL
CO2 SERPL-SCNC: 26 MMOL/L (ref 21–32)
COMMENT:: NORMAL
CREAT SERPL-MCNC: 0.97 MG/DL (ref 0.55–1.02)
DIFFERENTIAL METHOD BLD: ABNORMAL
EOSINOPHIL # BLD: 0.1 K/UL (ref 0–0.4)
EOSINOPHIL NFR BLD: 1 % (ref 0–7)
ERYTHROCYTE [DISTWIDTH] IN BLOOD BY AUTOMATED COUNT: 14.6 % (ref 11.5–14.5)
EST. AVERAGE GLUCOSE BLD GHB EST-MCNC: 171 MG/DL
GLOBULIN SER CALC-MCNC: 2.8 G/DL (ref 2–4)
GLUCOSE BLD STRIP.AUTO-MCNC: 143 MG/DL (ref 65–117)
GLUCOSE BLD STRIP.AUTO-MCNC: 165 MG/DL (ref 65–117)
GLUCOSE BLD STRIP.AUTO-MCNC: 172 MG/DL (ref 65–117)
GLUCOSE SERPL-MCNC: 187 MG/DL (ref 65–100)
HBA1C MFR BLD: 7.6 % (ref 4–5.6)
HCT VFR BLD AUTO: 37.2 % (ref 35–47)
HDLC SERPL-MCNC: 65 MG/DL
HDLC SERPL: 2.8 (ref 0–5)
HGB BLD-MCNC: 11.8 G/DL (ref 11.5–16)
IMM GRANULOCYTES # BLD AUTO: 0 K/UL (ref 0–0.04)
IMM GRANULOCYTES NFR BLD AUTO: 0 % (ref 0–0.5)
INR PPP: 1.1 (ref 0.9–1.1)
LDLC SERPL CALC-MCNC: 88.6 MG/DL (ref 0–100)
LYMPHOCYTES # BLD: 0.6 K/UL (ref 0.8–3.5)
LYMPHOCYTES NFR BLD: 11 % (ref 12–49)
MCH RBC QN AUTO: 28.6 PG (ref 26–34)
MCHC RBC AUTO-ENTMCNC: 31.7 G/DL (ref 30–36.5)
MCV RBC AUTO: 90.1 FL (ref 80–99)
MONOCYTES # BLD: 0.3 K/UL (ref 0–1)
MONOCYTES NFR BLD: 6 % (ref 5–13)
NEUTS SEG # BLD: 3.9 K/UL (ref 1.8–8)
NEUTS SEG NFR BLD: 81 % (ref 32–75)
NRBC # BLD: 0 K/UL (ref 0–0.01)
NRBC BLD-RTO: 0 PER 100 WBC
PLATELET # BLD AUTO: 239 K/UL (ref 150–400)
PMV BLD AUTO: 9.9 FL (ref 8.9–12.9)
POTASSIUM SERPL-SCNC: 3.9 MMOL/L (ref 3.5–5.1)
PROT SERPL-MCNC: 6.2 G/DL (ref 6.4–8.2)
PROTHROMBIN TIME: 11.3 SEC (ref 9–11.1)
RBC # BLD AUTO: 4.13 M/UL (ref 3.8–5.2)
RBC MORPH BLD: ABNORMAL
SERVICE CMNT-IMP: ABNORMAL
SODIUM SERPL-SCNC: 141 MMOL/L (ref 136–145)
SPECIMEN HOLD: NORMAL
TRIGL SERPL-MCNC: 152 MG/DL
TROPONIN I SERPL HS-MCNC: 6 NG/L (ref 0–51)
VLDLC SERPL CALC-MCNC: 30.4 MG/DL
WBC # BLD AUTO: 5 K/UL (ref 3.6–11)

## 2023-07-09 PROCEDURE — 82962 GLUCOSE BLOOD TEST: CPT

## 2023-07-09 PROCEDURE — 6370000000 HC RX 637 (ALT 250 FOR IP): Performed by: INTERNAL MEDICINE

## 2023-07-09 PROCEDURE — 85610 PROTHROMBIN TIME: CPT

## 2023-07-09 PROCEDURE — 80061 LIPID PANEL: CPT

## 2023-07-09 PROCEDURE — 93005 ELECTROCARDIOGRAM TRACING: CPT | Performed by: EMERGENCY MEDICINE

## 2023-07-09 PROCEDURE — 94761 N-INVAS EAR/PLS OXIMETRY MLT: CPT

## 2023-07-09 PROCEDURE — 6360000002 HC RX W HCPCS: Performed by: INTERNAL MEDICINE

## 2023-07-09 PROCEDURE — 96372 THER/PROPH/DIAG INJ SC/IM: CPT

## 2023-07-09 PROCEDURE — G0378 HOSPITAL OBSERVATION PER HR: HCPCS

## 2023-07-09 PROCEDURE — 84484 ASSAY OF TROPONIN QUANT: CPT

## 2023-07-09 PROCEDURE — 99285 EMERGENCY DEPT VISIT HI MDM: CPT

## 2023-07-09 PROCEDURE — 83036 HEMOGLOBIN GLYCOSYLATED A1C: CPT

## 2023-07-09 PROCEDURE — 85025 COMPLETE CBC W/AUTO DIFF WBC: CPT

## 2023-07-09 PROCEDURE — 70450 CT HEAD/BRAIN W/O DYE: CPT

## 2023-07-09 PROCEDURE — 80053 COMPREHEN METABOLIC PANEL: CPT

## 2023-07-09 PROCEDURE — 71045 X-RAY EXAM CHEST 1 VIEW: CPT

## 2023-07-09 RX ORDER — ONDANSETRON 2 MG/ML
4 INJECTION INTRAMUSCULAR; INTRAVENOUS EVERY 6 HOURS PRN
Status: DISCONTINUED | OUTPATIENT
Start: 2023-07-09 | End: 2023-07-14 | Stop reason: HOSPADM

## 2023-07-09 RX ORDER — LABETALOL HYDROCHLORIDE 5 MG/ML
10 INJECTION, SOLUTION INTRAVENOUS EVERY 10 MIN PRN
Status: DISCONTINUED | OUTPATIENT
Start: 2023-07-09 | End: 2023-07-14 | Stop reason: HOSPADM

## 2023-07-09 RX ORDER — ATORVASTATIN CALCIUM 20 MG/1
80 TABLET, FILM COATED ORAL NIGHTLY
Status: DISCONTINUED | OUTPATIENT
Start: 2023-07-09 | End: 2023-07-10

## 2023-07-09 RX ORDER — INSULIN LISPRO 100 [IU]/ML
0-4 INJECTION, SOLUTION INTRAVENOUS; SUBCUTANEOUS EVERY 4 HOURS
Status: DISCONTINUED | OUTPATIENT
Start: 2023-07-09 | End: 2023-07-10

## 2023-07-09 RX ORDER — ONDANSETRON 4 MG/1
4 TABLET, ORALLY DISINTEGRATING ORAL EVERY 8 HOURS PRN
Status: DISCONTINUED | OUTPATIENT
Start: 2023-07-09 | End: 2023-07-14 | Stop reason: HOSPADM

## 2023-07-09 RX ORDER — DEXTROSE MONOHYDRATE 100 MG/ML
INJECTION, SOLUTION INTRAVENOUS CONTINUOUS PRN
Status: DISCONTINUED | OUTPATIENT
Start: 2023-07-09 | End: 2023-07-14 | Stop reason: HOSPADM

## 2023-07-09 RX ORDER — POLYETHYLENE GLYCOL 3350 17 G/17G
17 POWDER, FOR SOLUTION ORAL DAILY PRN
Status: DISCONTINUED | OUTPATIENT
Start: 2023-07-09 | End: 2023-07-14 | Stop reason: HOSPADM

## 2023-07-09 RX ORDER — ASPIRIN 81 MG/1
324 TABLET, CHEWABLE ORAL ONCE
Status: COMPLETED | OUTPATIENT
Start: 2023-07-09 | End: 2023-07-09

## 2023-07-09 RX ORDER — ASPIRIN 81 MG/1
81 TABLET, CHEWABLE ORAL DAILY
Status: DISCONTINUED | OUTPATIENT
Start: 2023-07-10 | End: 2023-07-14 | Stop reason: HOSPADM

## 2023-07-09 RX ORDER — ENOXAPARIN SODIUM 100 MG/ML
40 INJECTION SUBCUTANEOUS DAILY
Status: DISCONTINUED | OUTPATIENT
Start: 2023-07-09 | End: 2023-07-14 | Stop reason: HOSPADM

## 2023-07-09 RX ADMIN — ENOXAPARIN SODIUM 40 MG: 40 INJECTION SUBCUTANEOUS at 15:49

## 2023-07-09 RX ADMIN — ASPIRIN 324 MG: 81 TABLET, CHEWABLE ORAL at 15:49

## 2023-07-09 RX ADMIN — ATORVASTATIN CALCIUM 80 MG: 20 TABLET, FILM COATED ORAL at 21:34

## 2023-07-09 ASSESSMENT — PAIN - FUNCTIONAL ASSESSMENT: PAIN_FUNCTIONAL_ASSESSMENT: NONE - DENIES PAIN

## 2023-07-10 ENCOUNTER — APPOINTMENT (OUTPATIENT)
Facility: HOSPITAL | Age: 78
DRG: 069 | End: 2023-07-10
Payer: MEDICARE

## 2023-07-10 ENCOUNTER — APPOINTMENT (OUTPATIENT)
Facility: HOSPITAL | Age: 78
DRG: 069 | End: 2023-07-10
Attending: INTERNAL MEDICINE
Payer: MEDICARE

## 2023-07-10 PROBLEM — G45.9 TIA (TRANSIENT ISCHEMIC ATTACK): Status: ACTIVE | Noted: 2023-07-10

## 2023-07-10 LAB
ANION GAP SERPL CALC-SCNC: 7 MMOL/L (ref 5–15)
BASOPHILS # BLD: 0 K/UL (ref 0–0.1)
BASOPHILS NFR BLD: 0 % (ref 0–1)
BUN SERPL-MCNC: 16 MG/DL (ref 6–20)
BUN/CREAT SERPL: 20 (ref 12–20)
CALCIUM SERPL-MCNC: 9.1 MG/DL (ref 8.5–10.1)
CHLORIDE SERPL-SCNC: 106 MMOL/L (ref 97–108)
CO2 SERPL-SCNC: 26 MMOL/L (ref 21–32)
CREAT SERPL-MCNC: 0.79 MG/DL (ref 0.55–1.02)
DIFFERENTIAL METHOD BLD: ABNORMAL
ECHO AO ASC DIAM: 2.7 CM
ECHO AO ASCENDING AORTA INDEX: 1.64 CM/M2
ECHO AV AREA PEAK VELOCITY: 2 CM2
ECHO AV AREA VTI: 2.1 CM2
ECHO AV AREA/BSA PEAK VELOCITY: 1.2 CM2/M2
ECHO AV AREA/BSA VTI: 1.3 CM2/M2
ECHO AV MEAN GRADIENT: 9 MMHG
ECHO AV MEAN VELOCITY: 1.4 M/S
ECHO AV PEAK GRADIENT: 18 MMHG
ECHO AV PEAK VELOCITY: 2.1 M/S
ECHO AV VELOCITY RATIO: 0.67
ECHO AV VTI: 43.9 CM
ECHO BSA: 1.67 M2
ECHO LA DIAMETER INDEX: 2.18 CM/M2
ECHO LA DIAMETER: 3.6 CM
ECHO LV EDV A2C: 66 ML
ECHO LV EDV A4C: 52 ML
ECHO LV EDV BP: 59 ML (ref 56–104)
ECHO LV EDV INDEX A4C: 32 ML/M2
ECHO LV EDV INDEX BP: 36 ML/M2
ECHO LV EDV NDEX A2C: 40 ML/M2
ECHO LV EJECTION FRACTION A2C: 79 %
ECHO LV EJECTION FRACTION A4C: 78 %
ECHO LV EJECTION FRACTION BIPLANE: 78 % (ref 55–100)
ECHO LV ESV A2C: 14 ML
ECHO LV ESV A4C: 12 ML
ECHO LV ESV BP: 13 ML (ref 19–49)
ECHO LV ESV INDEX A2C: 8 ML/M2
ECHO LV ESV INDEX A4C: 7 ML/M2
ECHO LV ESV INDEX BP: 8 ML/M2
ECHO LV FRACTIONAL SHORTENING: 37 % (ref 28–44)
ECHO LV INTERNAL DIMENSION DIASTOLE INDEX: 2.48 CM/M2
ECHO LV INTERNAL DIMENSION DIASTOLIC: 4.1 CM (ref 3.9–5.3)
ECHO LV INTERNAL DIMENSION SYSTOLIC INDEX: 1.58 CM/M2
ECHO LV INTERNAL DIMENSION SYSTOLIC: 2.6 CM
ECHO LV IVSD: 1.2 CM (ref 0.6–0.9)
ECHO LV MASS 2D: 193.5 G (ref 67–162)
ECHO LV MASS INDEX 2D: 117.3 G/M2 (ref 43–95)
ECHO LV POSTERIOR WALL DIASTOLIC: 1.4 CM (ref 0.6–0.9)
ECHO LV RELATIVE WALL THICKNESS RATIO: 0.68
ECHO LVOT AREA: 3.1 CM2
ECHO LVOT AV VTI INDEX: 0.67
ECHO LVOT DIAM: 2 CM
ECHO LVOT MEAN GRADIENT: 4 MMHG
ECHO LVOT PEAK GRADIENT: 8 MMHG
ECHO LVOT PEAK VELOCITY: 1.4 M/S
ECHO LVOT STROKE VOLUME INDEX: 56.3 ML/M2
ECHO LVOT SV: 92.9 ML
ECHO LVOT VTI: 29.6 CM
EKG ATRIAL RATE: 70 BPM
EKG DIAGNOSIS: NORMAL
EKG P AXIS: 20 DEGREES
EKG P-R INTERVAL: 162 MS
EKG Q-T INTERVAL: 396 MS
EKG QRS DURATION: 80 MS
EKG QTC CALCULATION (BAZETT): 427 MS
EKG R AXIS: 5 DEGREES
EKG T AXIS: 12 DEGREES
EKG VENTRICULAR RATE: 70 BPM
EOSINOPHIL # BLD: 0 K/UL (ref 0–0.4)
EOSINOPHIL NFR BLD: 0 % (ref 0–7)
ERYTHROCYTE [DISTWIDTH] IN BLOOD BY AUTOMATED COUNT: 14.7 % (ref 11.5–14.5)
GLUCOSE BLD STRIP.AUTO-MCNC: 139 MG/DL (ref 65–117)
GLUCOSE BLD STRIP.AUTO-MCNC: 144 MG/DL (ref 65–117)
GLUCOSE BLD STRIP.AUTO-MCNC: 147 MG/DL (ref 65–117)
GLUCOSE BLD STRIP.AUTO-MCNC: 152 MG/DL (ref 65–117)
GLUCOSE BLD STRIP.AUTO-MCNC: 157 MG/DL (ref 65–117)
GLUCOSE BLD STRIP.AUTO-MCNC: 185 MG/DL (ref 65–117)
GLUCOSE SERPL-MCNC: 149 MG/DL (ref 65–100)
HCT VFR BLD AUTO: 38 % (ref 35–47)
HGB BLD-MCNC: 11.8 G/DL (ref 11.5–16)
IMM GRANULOCYTES # BLD AUTO: 0 K/UL (ref 0–0.04)
IMM GRANULOCYTES NFR BLD AUTO: 0 % (ref 0–0.5)
LYMPHOCYTES # BLD: 0.8 K/UL (ref 0.8–3.5)
LYMPHOCYTES NFR BLD: 13 % (ref 12–49)
MCH RBC QN AUTO: 28.1 PG (ref 26–34)
MCHC RBC AUTO-ENTMCNC: 31.1 G/DL (ref 30–36.5)
MCV RBC AUTO: 90.5 FL (ref 80–99)
MONOCYTES # BLD: 0.4 K/UL (ref 0–1)
MONOCYTES NFR BLD: 6 % (ref 5–13)
NEUTS SEG # BLD: 4.9 K/UL (ref 1.8–8)
NEUTS SEG NFR BLD: 80 % (ref 32–75)
NRBC # BLD: 0 K/UL (ref 0–0.01)
NRBC BLD-RTO: 0 PER 100 WBC
PLATELET # BLD AUTO: 218 K/UL (ref 150–400)
PMV BLD AUTO: 10.3 FL (ref 8.9–12.9)
POTASSIUM SERPL-SCNC: 3.7 MMOL/L (ref 3.5–5.1)
RBC # BLD AUTO: 4.2 M/UL (ref 3.8–5.2)
SERVICE CMNT-IMP: ABNORMAL
SODIUM SERPL-SCNC: 139 MMOL/L (ref 136–145)
WBC # BLD AUTO: 6.1 K/UL (ref 3.6–11)

## 2023-07-10 PROCEDURE — 92610 EVALUATE SWALLOWING FUNCTION: CPT

## 2023-07-10 PROCEDURE — 80048 BASIC METABOLIC PNL TOTAL CA: CPT

## 2023-07-10 PROCEDURE — 70551 MRI BRAIN STEM W/O DYE: CPT

## 2023-07-10 PROCEDURE — 97535 SELF CARE MNGMENT TRAINING: CPT

## 2023-07-10 PROCEDURE — 6360000002 HC RX W HCPCS: Performed by: INTERNAL MEDICINE

## 2023-07-10 PROCEDURE — 6370000000 HC RX 637 (ALT 250 FOR IP): Performed by: INTERNAL MEDICINE

## 2023-07-10 PROCEDURE — 6360000004 HC RX CONTRAST MEDICATION: Performed by: EMERGENCY MEDICINE

## 2023-07-10 PROCEDURE — 82962 GLUCOSE BLOOD TEST: CPT

## 2023-07-10 PROCEDURE — 94761 N-INVAS EAR/PLS OXIMETRY MLT: CPT

## 2023-07-10 PROCEDURE — 36415 COLL VENOUS BLD VENIPUNCTURE: CPT

## 2023-07-10 PROCEDURE — 85025 COMPLETE CBC W/AUTO DIFF WBC: CPT

## 2023-07-10 PROCEDURE — 97163 PT EVAL HIGH COMPLEX 45 MIN: CPT

## 2023-07-10 PROCEDURE — G0378 HOSPITAL OBSERVATION PER HR: HCPCS

## 2023-07-10 PROCEDURE — 96372 THER/PROPH/DIAG INJ SC/IM: CPT

## 2023-07-10 PROCEDURE — 96374 THER/PROPH/DIAG INJ IV PUSH: CPT

## 2023-07-10 PROCEDURE — 99223 1ST HOSP IP/OBS HIGH 75: CPT | Performed by: PSYCHIATRY & NEUROLOGY

## 2023-07-10 PROCEDURE — 6370000000 HC RX 637 (ALT 250 FOR IP): Performed by: PSYCHIATRY & NEUROLOGY

## 2023-07-10 PROCEDURE — 97530 THERAPEUTIC ACTIVITIES: CPT

## 2023-07-10 PROCEDURE — 95819 EEG AWAKE AND ASLEEP: CPT

## 2023-07-10 PROCEDURE — 93321 DOPPLER ECHO F-UP/LMTD STD: CPT

## 2023-07-10 PROCEDURE — 97165 OT EVAL LOW COMPLEX 30 MIN: CPT

## 2023-07-10 PROCEDURE — 95816 EEG AWAKE AND DROWSY: CPT | Performed by: PSYCHIATRY & NEUROLOGY

## 2023-07-10 PROCEDURE — 70498 CT ANGIOGRAPHY NECK: CPT

## 2023-07-10 RX ORDER — LANOLIN ALCOHOL/MO/W.PET/CERES
10 CREAM (GRAM) TOPICAL
Status: DISCONTINUED | OUTPATIENT
Start: 2023-07-10 | End: 2023-07-14 | Stop reason: HOSPADM

## 2023-07-10 RX ORDER — INSULIN LISPRO 100 [IU]/ML
0-4 INJECTION, SOLUTION INTRAVENOUS; SUBCUTANEOUS
Status: DISCONTINUED | OUTPATIENT
Start: 2023-07-10 | End: 2023-07-14 | Stop reason: HOSPADM

## 2023-07-10 RX ORDER — ATORVASTATIN CALCIUM 40 MG/1
40 TABLET, FILM COATED ORAL NIGHTLY
Qty: 30 TABLET | Refills: 0 | Status: SHIPPED | OUTPATIENT
Start: 2023-07-10 | End: 2023-07-11 | Stop reason: HOSPADM

## 2023-07-10 RX ORDER — IBUPROFEN 200 MG
CAPSULE ORAL DAILY PRN
COMMUNITY

## 2023-07-10 RX ORDER — LEVETIRACETAM 250 MG/1
250 TABLET ORAL 2 TIMES DAILY
Qty: 60 TABLET | Refills: 0 | Status: SHIPPED | OUTPATIENT
Start: 2023-07-10 | End: 2023-08-09

## 2023-07-10 RX ORDER — PANTOPRAZOLE SODIUM 40 MG/1
40 TABLET, DELAYED RELEASE ORAL
Status: DISCONTINUED | OUTPATIENT
Start: 2023-07-11 | End: 2023-07-14 | Stop reason: HOSPADM

## 2023-07-10 RX ORDER — ATORVASTATIN CALCIUM 40 MG/1
40 TABLET, FILM COATED ORAL
COMMUNITY

## 2023-07-10 RX ORDER — VENLAFAXINE HYDROCHLORIDE 150 MG/1
150 CAPSULE, EXTENDED RELEASE ORAL DAILY
COMMUNITY

## 2023-07-10 RX ORDER — OMEPRAZOLE 40 MG/1
40 CAPSULE, DELAYED RELEASE ORAL DAILY
COMMUNITY

## 2023-07-10 RX ORDER — CELECOXIB 200 MG/1
200 CAPSULE ORAL DAILY PRN
COMMUNITY

## 2023-07-10 RX ORDER — LEVOTHYROXINE SODIUM 88 UG/1
88 TABLET ORAL DAILY
COMMUNITY

## 2023-07-10 RX ORDER — ATORVASTATIN CALCIUM 20 MG/1
40 TABLET, FILM COATED ORAL NIGHTLY
Status: DISCONTINUED | OUTPATIENT
Start: 2023-07-10 | End: 2023-07-14 | Stop reason: HOSPADM

## 2023-07-10 RX ORDER — LEVETIRACETAM 500 MG/1
500 TABLET ORAL 2 TIMES DAILY
Status: DISCONTINUED | OUTPATIENT
Start: 2023-07-10 | End: 2023-07-10

## 2023-07-10 RX ORDER — BUSPIRONE HYDROCHLORIDE 10 MG/1
10 TABLET ORAL DAILY
Status: DISCONTINUED | OUTPATIENT
Start: 2023-07-10 | End: 2023-07-14 | Stop reason: HOSPADM

## 2023-07-10 RX ORDER — LORAZEPAM 2 MG/ML
1 INJECTION INTRAMUSCULAR EVERY 6 HOURS PRN
Status: DISCONTINUED | OUTPATIENT
Start: 2023-07-10 | End: 2023-07-14 | Stop reason: HOSPADM

## 2023-07-10 RX ORDER — DONEPEZIL HYDROCHLORIDE 5 MG/1
5 TABLET, FILM COATED ORAL NIGHTLY
Status: DISCONTINUED | OUTPATIENT
Start: 2023-07-10 | End: 2023-07-14 | Stop reason: HOSPADM

## 2023-07-10 RX ORDER — PHENOL 1.4 %
10 AEROSOL, SPRAY (ML) MUCOUS MEMBRANE
COMMUNITY

## 2023-07-10 RX ORDER — BUSPIRONE HYDROCHLORIDE 10 MG/1
10 TABLET ORAL DAILY
COMMUNITY

## 2023-07-10 RX ORDER — ACETAMINOPHEN 325 MG/1
650 TABLET ORAL EVERY 4 HOURS PRN
COMMUNITY

## 2023-07-10 RX ORDER — VENLAFAXINE HYDROCHLORIDE 37.5 MG/1
150 CAPSULE, EXTENDED RELEASE ORAL DAILY
Status: DISCONTINUED | OUTPATIENT
Start: 2023-07-11 | End: 2023-07-14 | Stop reason: HOSPADM

## 2023-07-10 RX ORDER — DONEPEZIL HYDROCHLORIDE 5 MG/1
5 TABLET, FILM COATED ORAL NIGHTLY
COMMUNITY

## 2023-07-10 RX ORDER — ASPIRIN 81 MG/1
81 TABLET, CHEWABLE ORAL DAILY
Qty: 30 TABLET | Refills: 0 | Status: SHIPPED | OUTPATIENT
Start: 2023-07-11 | End: 2023-08-10

## 2023-07-10 RX ORDER — LEVOTHYROXINE SODIUM 88 UG/1
88 TABLET ORAL
Status: DISCONTINUED | OUTPATIENT
Start: 2023-07-11 | End: 2023-07-14 | Stop reason: HOSPADM

## 2023-07-10 RX ORDER — LEVETIRACETAM 250 MG/1
250 TABLET ORAL 2 TIMES DAILY
Status: DISCONTINUED | OUTPATIENT
Start: 2023-07-10 | End: 2023-07-14 | Stop reason: HOSPADM

## 2023-07-10 RX ORDER — BUPROPION HYDROCHLORIDE 150 MG/1
150 TABLET, EXTENDED RELEASE ORAL DAILY
Status: DISCONTINUED | OUTPATIENT
Start: 2023-07-11 | End: 2023-07-14 | Stop reason: HOSPADM

## 2023-07-10 RX ORDER — BUPROPION HYDROCHLORIDE 150 MG/1
150 TABLET, EXTENDED RELEASE ORAL DAILY
COMMUNITY

## 2023-07-10 RX ADMIN — DONEPEZIL HYDROCHLORIDE 5 MG: 5 TABLET, FILM COATED ORAL at 20:04

## 2023-07-10 RX ADMIN — ENOXAPARIN SODIUM 40 MG: 40 INJECTION SUBCUTANEOUS at 10:09

## 2023-07-10 RX ADMIN — ATORVASTATIN CALCIUM 40 MG: 20 TABLET, FILM COATED ORAL at 20:04

## 2023-07-10 RX ADMIN — Medication 10.5 MG: at 21:21

## 2023-07-10 RX ADMIN — LEVETIRACETAM 250 MG: 500 TABLET, FILM COATED ORAL at 20:04

## 2023-07-10 RX ADMIN — IOPAMIDOL 100 ML: 755 INJECTION, SOLUTION INTRAVENOUS at 16:37

## 2023-07-10 RX ADMIN — ASPIRIN 81 MG: 81 TABLET, CHEWABLE ORAL at 10:09

## 2023-07-10 RX ADMIN — LEVETIRACETAM 250 MG: 500 TABLET, FILM COATED ORAL at 11:39

## 2023-07-10 RX ADMIN — LORAZEPAM 1 MG: 2 INJECTION INTRAMUSCULAR; INTRAVENOUS at 16:06

## 2023-07-10 RX ADMIN — BUSPIRONE HYDROCHLORIDE 10 MG: 10 TABLET ORAL at 20:04

## 2023-07-10 ASSESSMENT — PAIN SCALES - GENERAL
PAINLEVEL_OUTOF10: 0

## 2023-07-10 NOTE — PROCEDURES
[facility]   Electroencephalogram  Report    Procedure ID: [id number] Procedure Date: 7/10/2023   Patient Name: Brittaney Billings YOB: 1945   Procedure Type: Routine Medical Record No: [id number]     An EEG is requested in this 20-year-old lady with altered mental status to evaluate for epileptiform abnormalities. Medications are not listed. This tracing is obtained while the patient was said to be awake but not following commands. During this state the tracing is degraded in large portion by patient generated movement and muscle artifact. Through the discernible portions the background consists of diffuse mixed theta range activities. Hyperventilation is not performed. Intermittent photic stimulation induces symmetric posterior driving responses at lower frequencies. Sleep is not attained. Interpretation  This EEG recorded during the awake state is abnormal secondary to diffuse slowing and disorganization of the background rhythms indicative of an encephalopathy nonspecific as to cause. No epileptiform abnormalities are seen.       Rhonda Curry MD

## 2023-07-10 NOTE — CARE COORDINATION
CM Note:  Pt is unable to provide a history. Her son, who is her legal guardian, plans to be in later today to visit. CM to complete assessment for d/c planning. Pt to return to [address] . She has a sitter.   JOCELINE Castro

## 2023-07-11 PROBLEM — R56.9 SEIZURE (HCC): Status: ACTIVE | Noted: 2023-07-11

## 2023-07-11 PROBLEM — R53.1 RIGHT SIDED WEAKNESS: Status: ACTIVE | Noted: 2023-07-11

## 2023-07-11 LAB
GLUCOSE BLD STRIP.AUTO-MCNC: 137 MG/DL (ref 65–117)
GLUCOSE BLD STRIP.AUTO-MCNC: 153 MG/DL (ref 65–117)
GLUCOSE BLD STRIP.AUTO-MCNC: 187 MG/DL (ref 65–117)
GLUCOSE BLD STRIP.AUTO-MCNC: 214 MG/DL (ref 65–117)
SERVICE CMNT-IMP: ABNORMAL

## 2023-07-11 PROCEDURE — 1100000000 HC RM PRIVATE

## 2023-07-11 PROCEDURE — G0378 HOSPITAL OBSERVATION PER HR: HCPCS

## 2023-07-11 PROCEDURE — 6370000000 HC RX 637 (ALT 250 FOR IP): Performed by: INTERNAL MEDICINE

## 2023-07-11 PROCEDURE — 99232 SBSQ HOSP IP/OBS MODERATE 35: CPT | Performed by: PSYCHIATRY & NEUROLOGY

## 2023-07-11 PROCEDURE — 6370000000 HC RX 637 (ALT 250 FOR IP): Performed by: PSYCHIATRY & NEUROLOGY

## 2023-07-11 PROCEDURE — 94761 N-INVAS EAR/PLS OXIMETRY MLT: CPT

## 2023-07-11 PROCEDURE — 97530 THERAPEUTIC ACTIVITIES: CPT

## 2023-07-11 PROCEDURE — 82962 GLUCOSE BLOOD TEST: CPT

## 2023-07-11 PROCEDURE — 96372 THER/PROPH/DIAG INJ SC/IM: CPT

## 2023-07-11 PROCEDURE — 6360000002 HC RX W HCPCS: Performed by: INTERNAL MEDICINE

## 2023-07-11 PROCEDURE — 97116 GAIT TRAINING THERAPY: CPT

## 2023-07-11 RX ADMIN — LEVETIRACETAM 250 MG: 500 TABLET, FILM COATED ORAL at 21:45

## 2023-07-11 RX ADMIN — LEVOTHYROXINE SODIUM 88 MCG: 88 TABLET ORAL at 06:25

## 2023-07-11 RX ADMIN — DONEPEZIL HYDROCHLORIDE 5 MG: 5 TABLET, FILM COATED ORAL at 21:45

## 2023-07-11 RX ADMIN — ATORVASTATIN CALCIUM 40 MG: 20 TABLET, FILM COATED ORAL at 21:45

## 2023-07-11 RX ADMIN — LEVETIRACETAM 250 MG: 500 TABLET, FILM COATED ORAL at 08:18

## 2023-07-11 RX ADMIN — ASPIRIN 81 MG: 81 TABLET, CHEWABLE ORAL at 08:18

## 2023-07-11 RX ADMIN — Medication 10.5 MG: at 21:47

## 2023-07-11 RX ADMIN — ENOXAPARIN SODIUM 40 MG: 40 INJECTION SUBCUTANEOUS at 08:20

## 2023-07-11 RX ADMIN — BUPROPION HYDROCHLORIDE 150 MG: 150 TABLET, EXTENDED RELEASE ORAL at 08:18

## 2023-07-11 RX ADMIN — BUSPIRONE HYDROCHLORIDE 10 MG: 10 TABLET ORAL at 08:18

## 2023-07-11 RX ADMIN — PANTOPRAZOLE SODIUM 40 MG: 40 TABLET, DELAYED RELEASE ORAL at 06:25

## 2023-07-11 RX ADMIN — VENLAFAXINE HYDROCHLORIDE 150 MG: 150 CAPSULE, EXTENDED RELEASE ORAL at 08:18

## 2023-07-11 RX ADMIN — INSULIN LISPRO 1 UNITS: 100 INJECTION, SOLUTION INTRAVENOUS; SUBCUTANEOUS at 11:43

## 2023-07-11 ASSESSMENT — PAIN SCALES - GENERAL
PAINLEVEL_OUTOF10: 0

## 2023-07-11 NOTE — CARE COORDINATION
7/11/2023  12:15 PM     07/11/23 1214   Service Assessment   Patient Orientation Person   Cognition Dementia / Early Alzheimer's   Services At/After Discharge   Transition of Care Consult (CM Consult) SNF   Partner SNF Yes   Condition of Participation: Discharge Planning   The Plan for Transition of Care is related to the following treatment goals: Skilled Nursing   The Patient and/or Patient Representative was provided with a Choice of Provider? Patient Representative   Name of the Patient Representative who was provided with the Choice of Provider and agrees with the Discharge Plan?  freddy Leary   The Patient and/Or Patient Representative agree with the Discharge Plan? Yes   Freedom of Choice list was provided with basic dialogue that supports the patient's individualized plan of care/goals, treatment preferences, and shares the quality data associated with the providers? (S)  Yes     CM met w/ pt's son Mariza Pamela at bedside, discussed SNF at [address] [place] [address], he agrees, [facility] offered, and [facility] in that order  of preference.    Referrals sent in [facility], pt will require 3 MN inpatient stay and be sitter free 24 H prior to [facility] staff to [facility] for return to facility,  Transport TBD pending progress  Will follow and assist   Kelsey Whelan

## 2023-07-11 NOTE — CARE COORDINATION
7/11/2023  10:55 AM     07/11/23 1050   Service Assessment   Patient Orientation Person   Cognition Dementia / Early Alzheimer's   History Provided By Child/Family  (son Mariah Treviño)   Primary Caregiver Other (Comment)  ([facility] staff)   Landen Artis Bose is: Legal Next of [address] [place] [address]   PCP Verified by CM Yes   Last Visit to PCP Within last 3 months   Prior Functional Level Assistance with the following:;Bathing;Dressing; Toileting;Mobility   Current Functional Level Assistance with the following:;Bathing;Dressing; Toileting;Mobility   Can patient return to prior living arrangement Unknown at present   Ability to make needs known: Unable   Family able to assist with home care needs: No  ([facility] staff)   Would you like for me to discuss the discharge plan with any other family members/significant others, and if so, who? Yes  (freddy Salcido/ADAM)   [facility]  ([facility])   Social/Functional History   Lives With Other (comment)  ([facility])   Type of Home Assisted living  ([facility])   Transfer Assistance Needs assistance   Active  No   Patient's  Info Family   Mode of Transportation Car     CM discussed pt w/ MD Zamudio, medically stable for DC today, therapy recommending SNF at DC. CM spoke w/ pt's son Mariah Treviño he is on the way to [facility] and agrees w/ SNF if [facility] is unable to accept pt back at current level of function. OSITO faxed therapy notes to Cruz Fernandez at [address] [phone], she advised she will be here this afternoon to eval for return  Therapy will see pt this AM to determine skilled needs.   Await therapy and [facility]DC eval for dispo  Will follow   Andrew Corral

## 2023-07-12 LAB
GLUCOSE BLD STRIP.AUTO-MCNC: 136 MG/DL (ref 65–117)
GLUCOSE BLD STRIP.AUTO-MCNC: 164 MG/DL (ref 65–117)
GLUCOSE BLD STRIP.AUTO-MCNC: 176 MG/DL (ref 65–117)
GLUCOSE BLD STRIP.AUTO-MCNC: 184 MG/DL (ref 65–117)
SERVICE CMNT-IMP: ABNORMAL

## 2023-07-12 PROCEDURE — 97530 THERAPEUTIC ACTIVITIES: CPT

## 2023-07-12 PROCEDURE — 6370000000 HC RX 637 (ALT 250 FOR IP): Performed by: PSYCHIATRY & NEUROLOGY

## 2023-07-12 PROCEDURE — 97116 GAIT TRAINING THERAPY: CPT

## 2023-07-12 PROCEDURE — 82962 GLUCOSE BLOOD TEST: CPT

## 2023-07-12 PROCEDURE — 1100000000 HC RM PRIVATE

## 2023-07-12 PROCEDURE — 6370000000 HC RX 637 (ALT 250 FOR IP): Performed by: INTERNAL MEDICINE

## 2023-07-12 PROCEDURE — 6360000002 HC RX W HCPCS: Performed by: INTERNAL MEDICINE

## 2023-07-12 PROCEDURE — 97535 SELF CARE MNGMENT TRAINING: CPT

## 2023-07-12 RX ADMIN — LEVETIRACETAM 250 MG: 500 TABLET, FILM COATED ORAL at 08:28

## 2023-07-12 RX ADMIN — ATORVASTATIN CALCIUM 40 MG: 20 TABLET, FILM COATED ORAL at 21:43

## 2023-07-12 RX ADMIN — BUSPIRONE HYDROCHLORIDE 10 MG: 10 TABLET ORAL at 08:28

## 2023-07-12 RX ADMIN — DONEPEZIL HYDROCHLORIDE 5 MG: 5 TABLET, FILM COATED ORAL at 21:43

## 2023-07-12 RX ADMIN — PANTOPRAZOLE SODIUM 40 MG: 40 TABLET, DELAYED RELEASE ORAL at 07:37

## 2023-07-12 RX ADMIN — LEVETIRACETAM 250 MG: 500 TABLET, FILM COATED ORAL at 21:43

## 2023-07-12 RX ADMIN — BUPROPION HYDROCHLORIDE 150 MG: 150 TABLET, EXTENDED RELEASE ORAL at 07:37

## 2023-07-12 RX ADMIN — VENLAFAXINE HYDROCHLORIDE 150 MG: 150 CAPSULE, EXTENDED RELEASE ORAL at 08:28

## 2023-07-12 RX ADMIN — ASPIRIN 81 MG: 81 TABLET, CHEWABLE ORAL at 08:28

## 2023-07-12 RX ADMIN — LEVOTHYROXINE SODIUM 88 MCG: 88 TABLET ORAL at 07:37

## 2023-07-12 RX ADMIN — Medication 10.5 MG: at 21:43

## 2023-07-12 ASSESSMENT — PAIN SCALES - GENERAL: PAINLEVEL_OUTOF10: 0

## 2023-07-12 NOTE — CARE COORDINATION
CM follow up:    Clinicals faxed to [facility] for review, patient's first choice for SNF care. Patient needs 3 nights to qualify for SNF, IP status 7/11/23. Augustina Ismael at bedside, will need to be sitter free x 24 hours for SNF.     Soto Rosa RN, MSN/Care manager

## 2023-07-13 LAB
GLUCOSE BLD STRIP.AUTO-MCNC: 164 MG/DL (ref 65–117)
GLUCOSE BLD STRIP.AUTO-MCNC: 168 MG/DL (ref 65–117)
GLUCOSE BLD STRIP.AUTO-MCNC: 174 MG/DL (ref 65–117)
GLUCOSE BLD STRIP.AUTO-MCNC: 240 MG/DL (ref 65–117)
SERVICE CMNT-IMP: ABNORMAL

## 2023-07-13 PROCEDURE — 97116 GAIT TRAINING THERAPY: CPT

## 2023-07-13 PROCEDURE — 97535 SELF CARE MNGMENT TRAINING: CPT

## 2023-07-13 PROCEDURE — 82962 GLUCOSE BLOOD TEST: CPT

## 2023-07-13 PROCEDURE — 6360000002 HC RX W HCPCS: Performed by: INTERNAL MEDICINE

## 2023-07-13 PROCEDURE — 6370000000 HC RX 637 (ALT 250 FOR IP): Performed by: PSYCHIATRY & NEUROLOGY

## 2023-07-13 PROCEDURE — 6370000000 HC RX 637 (ALT 250 FOR IP): Performed by: INTERNAL MEDICINE

## 2023-07-13 PROCEDURE — 1100000000 HC RM PRIVATE

## 2023-07-13 RX ADMIN — ATORVASTATIN CALCIUM 40 MG: 20 TABLET, FILM COATED ORAL at 21:35

## 2023-07-13 RX ADMIN — ASPIRIN 81 MG: 81 TABLET, CHEWABLE ORAL at 08:51

## 2023-07-13 RX ADMIN — ENOXAPARIN SODIUM 40 MG: 40 INJECTION SUBCUTANEOUS at 08:51

## 2023-07-13 RX ADMIN — DONEPEZIL HYDROCHLORIDE 5 MG: 5 TABLET, FILM COATED ORAL at 21:34

## 2023-07-13 RX ADMIN — LEVETIRACETAM 250 MG: 500 TABLET, FILM COATED ORAL at 08:51

## 2023-07-13 RX ADMIN — INSULIN LISPRO 1 UNITS: 100 INJECTION, SOLUTION INTRAVENOUS; SUBCUTANEOUS at 17:24

## 2023-07-13 RX ADMIN — VENLAFAXINE HYDROCHLORIDE 150 MG: 150 CAPSULE, EXTENDED RELEASE ORAL at 08:51

## 2023-07-13 RX ADMIN — Medication 10.5 MG: at 21:33

## 2023-07-13 RX ADMIN — BUSPIRONE HYDROCHLORIDE 10 MG: 10 TABLET ORAL at 08:51

## 2023-07-13 RX ADMIN — PANTOPRAZOLE SODIUM 40 MG: 40 TABLET, DELAYED RELEASE ORAL at 06:35

## 2023-07-13 RX ADMIN — BUPROPION HYDROCHLORIDE 150 MG: 150 TABLET, EXTENDED RELEASE ORAL at 08:51

## 2023-07-13 RX ADMIN — LEVETIRACETAM 250 MG: 500 TABLET, FILM COATED ORAL at 21:32

## 2023-07-13 RX ADMIN — LEVOTHYROXINE SODIUM 88 MCG: 88 TABLET ORAL at 06:35

## 2023-07-13 NOTE — CARE COORDINATION
7/13/2023  3:07 PM  Transition of Care Plan to SNF/Rehab    Communication to Patient/Family:  Met with patient and family and they are agreeable to the transition plan. The Plan for Transition of Care is related to the following treatment goals: TIA    The Patient and/or patient representative was provided with a choice of provider and agrees  with the discharge plan. Yes [x] No []    A Freedom of choice list was provided with basic dialogue that supports the patient's individualized plan of care/goals and shares the quality data associated with the providers. Yes [x] No []    SNF/Rehab Transition:  Patient has been accepted to [facility] SNF/Rehab and meets criteria for admission. Patient will transported by her son, Yuliya Antoine, and expected to leave at 11:00 AM. Packet in chart. Communication to SNF/Rehab:  Bedside RN has been notified to update the transition plan to the facility and call report (phone number [phone], [phone]). Discharge information has been updated on the AVS. And communicated to facility via [employer], or CC link. Discharge instructions to be fax'd to facility via [address] Nursing Please include all hard scripts for controlled substances, med rec and dc summary, and AVS in packet. Nursing, please discuss the following applicable information with [facility] of [address] [place] [address] during report:     Noe Min with (X) only those applicable:  Medication:  []Medications are available at the facility  []IV Antibiotics    []Controlled Substance - hard copies available sent.   []Weekly Labs    Equipment:  []CPAP/BiPAP  []Wound Vacuum  []Vaughn or Urinary Device  []PICC/Central Line  []Nebulizer  []Ventilator    Treatment:  []Isolation (for MRSA, VRE, etc.)  []Surgical Drain Management  []Tracheostomy Care  []Dressing Changes  []Dialysis with transportation  []PEG Care  []Oxygen  []Daily Weights for Heart Failure    Dietary:  []Any diet limitations  []Tube Feedings

## 2023-07-13 NOTE — CARE COORDINATION
7/13/2023  9:02 AM  Care Management Progress Note      ICD-10-CM    1. TIA (transient ischemic attack)  G45.9 Transthoracic echocardiogram (TTE) limited with contrast, bubble, strain, and 3D PRN     Transthoracic echocardiogram (TTE) limited with contrast, bubble, strain, and 3D PRN          RUR:  10%  Risk Level: [x]Low []Moderate []High  Value-based purchasing: [] Yes [x] No  Bundle patient: [] Yes [x] No   Specify:     Transition of care plan:  Awaiting medical clearance and DC order. PT/OT treating. Neuro following. SNF - CM received acceptance from [facility]. Pt will need 3 midnights prior which will be satisfied on 7/14/2023. Pt will need to be sitter free for 24 hours prior to DC. CM provided pt's son, Eilana Jaimes, with an update and he is agreeable. Outpatient follow-up. Transport need TBD - stretcher vs son. Pt's son not agreeable to wheelchair Sheela Maguire.

## 2023-07-14 VITALS
DIASTOLIC BLOOD PRESSURE: 71 MMHG | BODY MASS INDEX: 24.45 KG/M2 | HEIGHT: 63 IN | SYSTOLIC BLOOD PRESSURE: 132 MMHG | TEMPERATURE: 97.6 F | RESPIRATION RATE: 18 BRPM | HEART RATE: 63 BPM | WEIGHT: 138 LBS | OXYGEN SATURATION: 99 %

## 2023-07-14 LAB
GLUCOSE BLD STRIP.AUTO-MCNC: 159 MG/DL (ref 65–117)
SERVICE CMNT-IMP: ABNORMAL

## 2023-07-14 PROCEDURE — 6370000000 HC RX 637 (ALT 250 FOR IP): Performed by: INTERNAL MEDICINE

## 2023-07-14 PROCEDURE — 94761 N-INVAS EAR/PLS OXIMETRY MLT: CPT

## 2023-07-14 PROCEDURE — 6370000000 HC RX 637 (ALT 250 FOR IP): Performed by: PSYCHIATRY & NEUROLOGY

## 2023-07-14 PROCEDURE — 6360000002 HC RX W HCPCS: Performed by: INTERNAL MEDICINE

## 2023-07-14 PROCEDURE — 82962 GLUCOSE BLOOD TEST: CPT

## 2023-07-14 PROCEDURE — 98960 EDU&TRN PT SELF-MGMT NQHP 1: CPT

## 2023-07-14 RX ADMIN — BUPROPION HYDROCHLORIDE 150 MG: 150 TABLET, EXTENDED RELEASE ORAL at 08:30

## 2023-07-14 RX ADMIN — LEVOTHYROXINE SODIUM 88 MCG: 88 TABLET ORAL at 06:22

## 2023-07-14 RX ADMIN — ENOXAPARIN SODIUM 40 MG: 40 INJECTION SUBCUTANEOUS at 08:29

## 2023-07-14 RX ADMIN — BUSPIRONE HYDROCHLORIDE 10 MG: 10 TABLET ORAL at 08:30

## 2023-07-14 RX ADMIN — ASPIRIN 81 MG: 81 TABLET, CHEWABLE ORAL at 08:30

## 2023-07-14 RX ADMIN — VENLAFAXINE HYDROCHLORIDE 150 MG: 150 CAPSULE, EXTENDED RELEASE ORAL at 08:30

## 2023-07-14 RX ADMIN — PANTOPRAZOLE SODIUM 40 MG: 40 TABLET, DELAYED RELEASE ORAL at 06:22

## 2023-07-14 RX ADMIN — LEVETIRACETAM 250 MG: 500 TABLET, FILM COATED ORAL at 08:30

## 2023-08-12 ENCOUNTER — APPOINTMENT (OUTPATIENT)
Facility: HOSPITAL | Age: 78
DRG: 101 | End: 2023-08-12
Payer: MEDICARE

## 2023-08-12 ENCOUNTER — HOSPITAL ENCOUNTER (INPATIENT)
Facility: HOSPITAL | Age: 78
LOS: 4 days | Discharge: HOME | DRG: 101 | End: 2023-08-16
Attending: STUDENT IN AN ORGANIZED HEALTH CARE EDUCATION/TRAINING PROGRAM | Admitting: INTERNAL MEDICINE
Payer: MEDICARE

## 2023-08-12 DIAGNOSIS — G40.919 BREAKTHROUGH SEIZURE (HCC): Primary | ICD-10-CM

## 2023-08-12 LAB
ALBUMIN SERPL-MCNC: 3.4 G/DL (ref 3.5–5)
ALBUMIN/GLOB SERPL: 1.1 (ref 1.1–2.2)
ALP SERPL-CCNC: 75 U/L (ref 45–117)
ALT SERPL-CCNC: 26 U/L (ref 12–78)
ANION GAP SERPL CALC-SCNC: 4 MMOL/L (ref 5–15)
AST SERPL-CCNC: 26 U/L (ref 15–37)
BASOPHILS # BLD: 0 K/UL (ref 0–0.1)
BASOPHILS NFR BLD: 0 % (ref 0–1)
BILIRUB SERPL-MCNC: 0.4 MG/DL (ref 0.2–1)
BUN SERPL-MCNC: 17 MG/DL (ref 6–20)
BUN/CREAT SERPL: 19 (ref 12–20)
CALCIUM SERPL-MCNC: 9.3 MG/DL (ref 8.5–10.1)
CHLORIDE SERPL-SCNC: 110 MMOL/L (ref 97–108)
CO2 SERPL-SCNC: 28 MMOL/L (ref 21–32)
COMMENT:: NORMAL
CREAT SERPL-MCNC: 0.89 MG/DL (ref 0.55–1.02)
DIFFERENTIAL METHOD BLD: ABNORMAL
EOSINOPHIL # BLD: 0 K/UL (ref 0–0.4)
EOSINOPHIL NFR BLD: 0 % (ref 0–7)
ERYTHROCYTE [DISTWIDTH] IN BLOOD BY AUTOMATED COUNT: 14.2 % (ref 11.5–14.5)
GLOBULIN SER CALC-MCNC: 3.1 G/DL (ref 2–4)
GLUCOSE BLD STRIP.AUTO-MCNC: 160 MG/DL (ref 65–117)
GLUCOSE SERPL-MCNC: 167 MG/DL (ref 65–100)
HCT VFR BLD AUTO: 36.6 % (ref 35–47)
HGB BLD-MCNC: 11.7 G/DL (ref 11.5–16)
IMM GRANULOCYTES # BLD AUTO: 0 K/UL (ref 0–0.04)
IMM GRANULOCYTES NFR BLD AUTO: 0 % (ref 0–0.5)
LYMPHOCYTES # BLD: 0.7 K/UL (ref 0.8–3.5)
LYMPHOCYTES NFR BLD: 11 % (ref 12–49)
MAGNESIUM SERPL-MCNC: 2.2 MG/DL (ref 1.6–2.4)
MCH RBC QN AUTO: 28.6 PG (ref 26–34)
MCHC RBC AUTO-ENTMCNC: 32 G/DL (ref 30–36.5)
MCV RBC AUTO: 89.5 FL (ref 80–99)
MONOCYTES # BLD: 0.4 K/UL (ref 0–1)
MONOCYTES NFR BLD: 6 % (ref 5–13)
NEUTS SEG # BLD: 5.4 K/UL (ref 1.8–8)
NEUTS SEG NFR BLD: 83 % (ref 32–75)
NRBC # BLD: 0 K/UL (ref 0–0.01)
NRBC BLD-RTO: 0 PER 100 WBC
PLATELET # BLD AUTO: 257 K/UL (ref 150–400)
PMV BLD AUTO: 10 FL (ref 8.9–12.9)
POTASSIUM SERPL-SCNC: 3.9 MMOL/L (ref 3.5–5.1)
PROT SERPL-MCNC: 6.5 G/DL (ref 6.4–8.2)
RBC # BLD AUTO: 4.09 M/UL (ref 3.8–5.2)
RBC MORPH BLD: ABNORMAL
RBC MORPH BLD: ABNORMAL
SERVICE CMNT-IMP: ABNORMAL
SODIUM SERPL-SCNC: 142 MMOL/L (ref 136–145)
SPECIMEN HOLD: NORMAL
WBC # BLD AUTO: 6.5 K/UL (ref 3.6–11)

## 2023-08-12 PROCEDURE — 2580000003 HC RX 258: Performed by: STUDENT IN AN ORGANIZED HEALTH CARE EDUCATION/TRAINING PROGRAM

## 2023-08-12 PROCEDURE — 93005 ELECTROCARDIOGRAM TRACING: CPT | Performed by: EMERGENCY MEDICINE

## 2023-08-12 PROCEDURE — 99285 EMERGENCY DEPT VISIT HI MDM: CPT

## 2023-08-12 PROCEDURE — 1100000000 HC RM PRIVATE

## 2023-08-12 PROCEDURE — 70450 CT HEAD/BRAIN W/O DYE: CPT

## 2023-08-12 PROCEDURE — 6360000002 HC RX W HCPCS: Performed by: STUDENT IN AN ORGANIZED HEALTH CARE EDUCATION/TRAINING PROGRAM

## 2023-08-12 PROCEDURE — 82962 GLUCOSE BLOOD TEST: CPT

## 2023-08-12 PROCEDURE — 96374 THER/PROPH/DIAG INJ IV PUSH: CPT

## 2023-08-12 PROCEDURE — 2580000003 HC RX 258: Performed by: INTERNAL MEDICINE

## 2023-08-12 PROCEDURE — 80053 COMPREHEN METABOLIC PANEL: CPT

## 2023-08-12 PROCEDURE — 71046 X-RAY EXAM CHEST 2 VIEWS: CPT

## 2023-08-12 PROCEDURE — 85025 COMPLETE CBC W/AUTO DIFF WBC: CPT

## 2023-08-12 PROCEDURE — 83735 ASSAY OF MAGNESIUM: CPT

## 2023-08-12 RX ORDER — LORAZEPAM 2 MG/ML
2 INJECTION INTRAMUSCULAR
Status: DISCONTINUED | OUTPATIENT
Start: 2023-08-12 | End: 2023-08-16 | Stop reason: HOSPADM

## 2023-08-12 RX ORDER — POLYETHYLENE GLYCOL 3350 17 G/17G
17 POWDER, FOR SOLUTION ORAL DAILY PRN
Status: DISCONTINUED | OUTPATIENT
Start: 2023-08-12 | End: 2023-08-16 | Stop reason: HOSPADM

## 2023-08-12 RX ORDER — SODIUM CHLORIDE 9 MG/ML
INJECTION, SOLUTION INTRAVENOUS CONTINUOUS
Status: DISCONTINUED | OUTPATIENT
Start: 2023-08-12 | End: 2023-08-13

## 2023-08-12 RX ORDER — ENOXAPARIN SODIUM 100 MG/ML
40 INJECTION SUBCUTANEOUS DAILY
Status: DISCONTINUED | OUTPATIENT
Start: 2023-08-13 | End: 2023-08-16 | Stop reason: HOSPADM

## 2023-08-12 RX ADMIN — SODIUM CHLORIDE: 9 INJECTION, SOLUTION INTRAVENOUS at 23:17

## 2023-08-12 RX ADMIN — SODIUM CHLORIDE 2500 MG: 9 INJECTION, SOLUTION INTRAVENOUS at 19:47

## 2023-08-12 ASSESSMENT — PAIN - FUNCTIONAL ASSESSMENT: PAIN_FUNCTIONAL_ASSESSMENT: NONE - DENIES PAIN

## 2023-08-12 NOTE — ED TRIAGE NOTES
Patient to ER via EMS for complaints of seizures per the assisted living [facility] . Staff reports 3- 4 seizures witnessed today the last one at 1740 lasting approx. 3 minutes according to staff. Blood sugar for . Staff of [facility] denies any recent falls or trauma and reports all seizure were whiteness. EMS reports staff stated patient is more lethargic than her baseline.

## 2023-08-13 ENCOUNTER — APPOINTMENT (OUTPATIENT)
Facility: HOSPITAL | Age: 78
DRG: 101 | End: 2023-08-13
Payer: MEDICARE

## 2023-08-13 PROBLEM — G30.1 MODERATE LATE ONSET ALZHEIMER'S DEMENTIA WITHOUT BEHAVIORAL DISTURBANCE, PSYCHOTIC DISTURBANCE, MOOD DISTURBANCE, OR ANXIETY (HCC): Status: ACTIVE | Noted: 2021-11-29

## 2023-08-13 PROBLEM — F02.B0 MODERATE LATE ONSET ALZHEIMER'S DEMENTIA WITHOUT BEHAVIORAL DISTURBANCE, PSYCHOTIC DISTURBANCE, MOOD DISTURBANCE, OR ANXIETY (HCC): Status: ACTIVE | Noted: 2021-11-29

## 2023-08-13 LAB
ALBUMIN SERPL-MCNC: 3.3 G/DL (ref 3.5–5)
ALBUMIN/GLOB SERPL: 1.3 (ref 1.1–2.2)
ALP SERPL-CCNC: 70 U/L (ref 45–117)
ALT SERPL-CCNC: 25 U/L (ref 12–78)
AMORPH CRY URNS QL MICRO: ABNORMAL
ANION GAP SERPL CALC-SCNC: 7 MMOL/L (ref 5–15)
APPEARANCE UR: ABNORMAL
AST SERPL-CCNC: 24 U/L (ref 15–37)
BACTERIA URNS QL MICRO: NEGATIVE /HPF
BASOPHILS # BLD: 0 K/UL (ref 0–0.1)
BASOPHILS NFR BLD: 1 % (ref 0–1)
BILIRUB DIRECT SERPL-MCNC: 0.2 MG/DL (ref 0–0.2)
BILIRUB SERPL-MCNC: 0.6 MG/DL (ref 0.2–1)
BILIRUB UR QL: NEGATIVE
BUN SERPL-MCNC: 17 MG/DL (ref 6–20)
BUN/CREAT SERPL: 21 (ref 12–20)
CALCIUM SERPL-MCNC: 9.1 MG/DL (ref 8.5–10.1)
CHLORIDE SERPL-SCNC: 110 MMOL/L (ref 97–108)
CHOLEST SERPL-MCNC: 169 MG/DL
CO2 SERPL-SCNC: 25 MMOL/L (ref 21–32)
COLOR UR: ABNORMAL
CREAT SERPL-MCNC: 0.82 MG/DL (ref 0.55–1.02)
DIFFERENTIAL METHOD BLD: ABNORMAL
EKG ATRIAL RATE: 73 BPM
EKG DIAGNOSIS: NORMAL
EKG P AXIS: 43 DEGREES
EKG P-R INTERVAL: 154 MS
EKG Q-T INTERVAL: 406 MS
EKG QRS DURATION: 84 MS
EKG QTC CALCULATION (BAZETT): 447 MS
EKG R AXIS: -7 DEGREES
EKG T AXIS: 34 DEGREES
EKG VENTRICULAR RATE: 73 BPM
EOSINOPHIL # BLD: 0 K/UL (ref 0–0.4)
EOSINOPHIL NFR BLD: 0 % (ref 0–7)
EPITH CASTS URNS QL MICRO: ABNORMAL /LPF
ERYTHROCYTE [DISTWIDTH] IN BLOOD BY AUTOMATED COUNT: 14.1 % (ref 11.5–14.5)
EST. AVERAGE GLUCOSE BLD GHB EST-MCNC: 157 MG/DL
GLOBULIN SER CALC-MCNC: 2.6 G/DL (ref 2–4)
GLUCOSE BLD STRIP.AUTO-MCNC: 124 MG/DL (ref 65–117)
GLUCOSE SERPL-MCNC: 148 MG/DL (ref 65–100)
GLUCOSE UR STRIP.AUTO-MCNC: NEGATIVE MG/DL
HBA1C MFR BLD: 7.1 % (ref 4–5.6)
HCT VFR BLD AUTO: 36 % (ref 35–47)
HDLC SERPL-MCNC: 53 MG/DL
HDLC SERPL: 3.2 (ref 0–5)
HGB BLD-MCNC: 11.4 G/DL (ref 11.5–16)
HGB UR QL STRIP: NEGATIVE
IMM GRANULOCYTES # BLD AUTO: 0 K/UL (ref 0–0.04)
IMM GRANULOCYTES NFR BLD AUTO: 1 % (ref 0–0.5)
KETONES UR QL STRIP.AUTO: NEGATIVE MG/DL
LDLC SERPL CALC-MCNC: 96.4 MG/DL (ref 0–100)
LEUKOCYTE ESTERASE UR QL STRIP.AUTO: ABNORMAL
LYMPHOCYTES # BLD: 0.9 K/UL (ref 0.8–3.5)
LYMPHOCYTES NFR BLD: 16 % (ref 12–49)
MAGNESIUM SERPL-MCNC: 2.2 MG/DL (ref 1.6–2.4)
MCH RBC QN AUTO: 29.1 PG (ref 26–34)
MCHC RBC AUTO-ENTMCNC: 31.7 G/DL (ref 30–36.5)
MCV RBC AUTO: 91.8 FL (ref 80–99)
MONOCYTES # BLD: 0.4 K/UL (ref 0–1)
MONOCYTES NFR BLD: 7 % (ref 5–13)
NEUTS SEG # BLD: 4.3 K/UL (ref 1.8–8)
NEUTS SEG NFR BLD: 76 % (ref 32–75)
NITRITE UR QL STRIP.AUTO: NEGATIVE
NRBC # BLD: 0 K/UL (ref 0–0.01)
NRBC BLD-RTO: 0 PER 100 WBC
PH UR STRIP: 6.5 (ref 5–8)
PLATELET # BLD AUTO: 194 K/UL (ref 150–400)
POTASSIUM SERPL-SCNC: 3.8 MMOL/L (ref 3.5–5.1)
PROT SERPL-MCNC: 5.9 G/DL (ref 6.4–8.2)
PROT UR STRIP-MCNC: NEGATIVE MG/DL
RBC # BLD AUTO: 3.92 M/UL (ref 3.8–5.2)
RBC #/AREA URNS HPF: ABNORMAL /HPF (ref 0–5)
SERVICE CMNT-IMP: ABNORMAL
SODIUM SERPL-SCNC: 142 MMOL/L (ref 136–145)
SP GR UR REFRACTOMETRY: 1.02 (ref 1–1.03)
SPECIMEN HOLD: NORMAL
TRIGL SERPL-MCNC: 98 MG/DL
TSH SERPL DL<=0.05 MIU/L-ACNC: 1.16 UIU/ML (ref 0.36–3.74)
UROBILINOGEN UR QL STRIP.AUTO: 1 EU/DL (ref 0.2–1)
VLDLC SERPL CALC-MCNC: 19.6 MG/DL
WBC # BLD AUTO: 5.6 K/UL (ref 3.6–11)
WBC URNS QL MICRO: ABNORMAL /HPF (ref 0–4)

## 2023-08-13 PROCEDURE — 83036 HEMOGLOBIN GLYCOSYLATED A1C: CPT

## 2023-08-13 PROCEDURE — 81001 URINALYSIS AUTO W/SCOPE: CPT

## 2023-08-13 PROCEDURE — 36415 COLL VENOUS BLD VENIPUNCTURE: CPT

## 2023-08-13 PROCEDURE — 99222 1ST HOSP IP/OBS MODERATE 55: CPT | Performed by: PSYCHIATRY & NEUROLOGY

## 2023-08-13 PROCEDURE — 70553 MRI BRAIN STEM W/O & W/DYE: CPT

## 2023-08-13 PROCEDURE — 84443 ASSAY THYROID STIM HORMONE: CPT

## 2023-08-13 PROCEDURE — 85025 COMPLETE CBC W/AUTO DIFF WBC: CPT

## 2023-08-13 PROCEDURE — 6370000000 HC RX 637 (ALT 250 FOR IP): Performed by: INTERNAL MEDICINE

## 2023-08-13 PROCEDURE — 93010 ELECTROCARDIOGRAM REPORT: CPT | Performed by: SPECIALIST

## 2023-08-13 PROCEDURE — 80076 HEPATIC FUNCTION PANEL: CPT

## 2023-08-13 PROCEDURE — 83735 ASSAY OF MAGNESIUM: CPT

## 2023-08-13 PROCEDURE — A9579 GAD-BASE MR CONTRAST NOS,1ML: HCPCS | Performed by: RADIOLOGY

## 2023-08-13 PROCEDURE — 80061 LIPID PANEL: CPT

## 2023-08-13 PROCEDURE — 80048 BASIC METABOLIC PNL TOTAL CA: CPT

## 2023-08-13 PROCEDURE — 6360000004 HC RX CONTRAST MEDICATION: Performed by: RADIOLOGY

## 2023-08-13 PROCEDURE — 1100000000 HC RM PRIVATE

## 2023-08-13 PROCEDURE — 82962 GLUCOSE BLOOD TEST: CPT

## 2023-08-13 PROCEDURE — 6360000002 HC RX W HCPCS: Performed by: INTERNAL MEDICINE

## 2023-08-13 RX ORDER — PANTOPRAZOLE SODIUM 40 MG/1
40 TABLET, DELAYED RELEASE ORAL
Status: DISCONTINUED | OUTPATIENT
Start: 2023-08-13 | End: 2023-08-16 | Stop reason: HOSPADM

## 2023-08-13 RX ORDER — ATORVASTATIN CALCIUM 20 MG/1
80 TABLET, FILM COATED ORAL DAILY
Status: DISCONTINUED | OUTPATIENT
Start: 2023-08-13 | End: 2023-08-16 | Stop reason: HOSPADM

## 2023-08-13 RX ORDER — ASPIRIN 81 MG/1
81 TABLET, CHEWABLE ORAL DAILY
Status: DISCONTINUED | OUTPATIENT
Start: 2023-08-13 | End: 2023-08-16 | Stop reason: HOSPADM

## 2023-08-13 RX ORDER — LEVETIRACETAM 500 MG/1
250 TABLET ORAL 2 TIMES DAILY
Status: DISCONTINUED | OUTPATIENT
Start: 2023-08-13 | End: 2023-08-16 | Stop reason: HOSPADM

## 2023-08-13 RX ORDER — BUSPIRONE HYDROCHLORIDE 10 MG/1
10 TABLET ORAL DAILY
Status: DISCONTINUED | OUTPATIENT
Start: 2023-08-13 | End: 2023-08-16 | Stop reason: HOSPADM

## 2023-08-13 RX ORDER — FENOFIBRATE 160 MG/1
160 TABLET ORAL DAILY
Status: DISCONTINUED | OUTPATIENT
Start: 2023-08-13 | End: 2023-08-16 | Stop reason: HOSPADM

## 2023-08-13 RX ORDER — LEVOTHYROXINE SODIUM 88 UG/1
88 TABLET ORAL DAILY
Status: DISCONTINUED | OUTPATIENT
Start: 2023-08-13 | End: 2023-08-16 | Stop reason: HOSPADM

## 2023-08-13 RX ORDER — CELECOXIB 100 MG/1
200 CAPSULE ORAL DAILY
Status: DISCONTINUED | OUTPATIENT
Start: 2023-08-13 | End: 2023-08-16 | Stop reason: HOSPADM

## 2023-08-13 RX ORDER — VENLAFAXINE HYDROCHLORIDE 150 MG/1
150 CAPSULE, EXTENDED RELEASE ORAL DAILY
Status: DISCONTINUED | OUTPATIENT
Start: 2023-08-13 | End: 2023-08-16 | Stop reason: HOSPADM

## 2023-08-13 RX ORDER — DONEPEZIL HYDROCHLORIDE 5 MG/1
5 TABLET, FILM COATED ORAL NIGHTLY
Status: DISCONTINUED | OUTPATIENT
Start: 2023-08-13 | End: 2023-08-16 | Stop reason: HOSPADM

## 2023-08-13 RX ADMIN — LEVOTHYROXINE SODIUM 88 MCG: 88 TABLET ORAL at 09:49

## 2023-08-13 RX ADMIN — ENOXAPARIN SODIUM 40 MG: 100 INJECTION SUBCUTANEOUS at 09:50

## 2023-08-13 RX ADMIN — CELECOXIB 200 MG: 100 CAPSULE ORAL at 09:49

## 2023-08-13 RX ADMIN — ASPIRIN 81 MG: 81 TABLET, CHEWABLE ORAL at 09:50

## 2023-08-13 RX ADMIN — GADOTERIDOL 12 ML: 279.3 INJECTION, SOLUTION INTRAVENOUS at 12:16

## 2023-08-13 RX ADMIN — LEVETIRACETAM 250 MG: 500 TABLET, FILM COATED ORAL at 09:49

## 2023-08-13 RX ADMIN — VENLAFAXINE HYDROCHLORIDE 150 MG: 150 CAPSULE, EXTENDED RELEASE ORAL at 09:50

## 2023-08-13 RX ADMIN — PANTOPRAZOLE SODIUM 40 MG: 40 TABLET, DELAYED RELEASE ORAL at 09:50

## 2023-08-13 RX ADMIN — DONEPEZIL HYDROCHLORIDE 5 MG: 5 TABLET, FILM COATED ORAL at 20:23

## 2023-08-13 RX ADMIN — LEVETIRACETAM 250 MG: 500 TABLET, FILM COATED ORAL at 20:23

## 2023-08-13 RX ADMIN — FENOFIBRATE 160 MG: 160 TABLET ORAL at 09:49

## 2023-08-13 RX ADMIN — ATORVASTATIN CALCIUM 80 MG: 20 TABLET, FILM COATED ORAL at 09:50

## 2023-08-13 RX ADMIN — BUSPIRONE HYDROCHLORIDE 10 MG: 10 TABLET ORAL at 09:50

## 2023-08-13 ASSESSMENT — PAIN SCALES - GENERAL
PAINLEVEL_OUTOF10: 0

## 2023-08-13 NOTE — CONSULTS
NEUROLOGY CONSULT NOTE    Patient ID:  Ovidio Chapa  [id number]  20 y.o.  1945    Date of Consultation:  August 13, 2023    Referring Physician: Jeffrey Chen MD    Reason for Consultation:  seizure    Chief Complaint   Patient presents with    Seizures       History of Present Illness:     Patient Active Problem List    Diagnosis Date Noted    Seizure ([address]) 07/11/2023    Right sided weakness 07/11/2023    TIA (transient ischemic attack) 07/10/2023    Facial droop 07/09/2023    Urinary incontinence     Hypercholesterolemia     DM (diabetes mellitus) ([address])     Syncope 05/08/2022    Late onset Alzheimer's disease without behavioral disturbance ([address]) 11/29/2021    KIKA on CPAP     Hypothyroid 10/28/2014    OA (osteoarthritis) 10/28/2014    Depression 10/28/2014    Anxiety 10/28/2014    HLD (hyperlipidemia) 10/28/2014    Incontinence of urine in female 10/28/2014     Past Medical History:   Diagnosis Date    Depression     DM (diabetes mellitus) ([address])     Hypercholesterolemia     Hypothyroid     KIKA on CPAP     Dr. Kevin Alexandra     Urinary incontinence       Past Surgical History:   Procedure Laterality Date    COLONOSCOPY      GYN      HEENT      ORTHOPEDIC SURGERY      CO UNLISTED PROCEDURE ABDOMEN PERITONEUM & OMENTUM      UPPER GASTROINTESTINAL ENDOSCOPY        Prior to Admission medications    Medication Sig Start Date End Date Taking?  Authorizing Provider   aspirin 81 MG chewable tablet Take 1 tablet by mouth daily 7/11/23 8/10/23  Jose Alfredo Dubon MD   levETIRAcetam (KEPPRA) 250 MG tablet Take 1 tablet by mouth 2 times daily 7/10/23 8/9/23  Jose Alfredo Dubon MD   atorvastatin (LIPITOR) 40 MG tablet Take 1 tablet by mouth nightly    Historical Provider, MD   buPROPion [facility] SR) 150 MG extended release tablet Take 1 tablet by mouth daily    Historical Provider, MD   donepezil (ARICEPT) 5 MG tablet Take 1 tablet by mouth nightly    Historical Provider, MD   levothyroxine (SYNTHROID) 88 MCG daily.    No further recommendations from a neurological standpoint. Patient is okay to be discharged from neurological standpoint. Thank you for the consult. 55 minutes was spent with this patient. This included review of her medical records including outpatient visits and previous diagnostic testing done as summarized above, actual evaluation with patient, formulation of treatment plan    This note was created using voice recognition software. Despite editing, there may be syntax errors.

## 2023-08-13 NOTE — H&P
Hospitalist Admission Note    NAME: Maame Montano   :  1945   MRN:  [id number]     Date/Time:  2023 10:27 PM    Patient PCP: None None      CHIEF COMPLAINT: Seizures    HISTORY OF PRESENT ILLNESS:     Maame Montano is a 76 y.o.  female with history of  seizure disorder on Keppra, depression, diabetes, hypertension, high cholesterol, and sleep apnea presenting with acute onset of breakthrough seizures. It was mentioned that the patient may not be receiving her Keppra at the nursing home. She had multiple witnessed events today. At this time my evaluation she is very somnolent and a poor historian. She has no complaints. Of note, she is on a very low-dose of Keppra and it may need to be increased. She received IV Keppra in the ER which could be contributing to her somnolence. PAST MEDICAL HISTORY:  Seizure disorder on Keppra    Past Medical History:   Diagnosis Date    Depression     DM (diabetes mellitus) ([address])     Hypercholesterolemia     Hypothyroid     KIKA on CPAP     Dr. Dodie Kayser     Urinary incontinence       Past Surgical History:   Procedure Laterality Date    COLONOSCOPY      GYN      HEENT      ORTHOPEDIC SURGERY      OR UNLISTED PROCEDURE ABDOMEN PERITONEUM & OMENTUM      UPPER GASTROINTESTINAL ENDOSCOPY       Social History     Tobacco Use    Smoking status: Never    Smokeless tobacco: Never   Substance Use Topics    Alcohol use: Not Currently      Family History   Problem Relation Age of Onset    No Known Problems Mother     No Known Problems Father       Allergies   Allergen Reactions    Gabapentin      Other reaction(s): Drowsiness    Deserpidine Other (See Comments)     Puts pressure around her head like a ring    Desipramine Other (See Comments)     unspecified    Gabapentin      Facial droop       Pcn [Penicillins] Headaches          REVIEW OF SYSTEMS:      General:   No fever or chills  Eyes: No changes in vision.   ENT:  No rhinorrhea,  congestion, or Care Plan discussed with: Patient, nurse,     Discussed:  Care Plan       Procedures: see electronic medical records for all procedures/Xrays and details which were not copied into this note but were reviewed prior to creation of Plan. I have personally reviewed the radiographs, laboratory data in Epic and decisions and statements above are based partially on this personal interpretation. Given the patient's current clinical presentation, I have a high level of concern for decompensation if discharged from the emergency department. Complex decision making was performed, which includes reviewing the patient's available past medical records, laboratory results, and x-ray films.        Signed: Anette Velarde MD

## 2023-08-13 NOTE — CARE COORDINATION
08/13/23 1411   Service Assessment   Patient Orientation Other (see comment)  (dementia)   Cognition Dementia / Early Alzheimer's   History Provided By Medical Record; Other (see comment)  (Linnette Alvares in nursing.)   [facility]; Other (Comment)  (has been a resident of [facility] for a year and a half.)   PCP Verified by CM Yes   Prior Functional Level Assistance with the following:;Bathing;Dressing; Toileting;Cooking;Housework; Shopping;Mobility   Current Functional Level Assistance with the following:;Bathing;Dressing; Toileting;Mobility   Can patient return to prior living arrangement Unknown at present  (DON from [facility] needs to come see her to assess ability for her to return.)   Ability to make needs known: Ariannajordan Sanchez   Family able to assist with home care needs: Other (comment)  (is an [facility] resident.)   Would you like for me to discuss the discharge plan with any other family members/significant others, and if so, who? Yes  (patient able to say yes to cm calling family and message left. CM spoke with [facility] of [facility])   Financial Resources Medicare   Social/Functional History   Type of Dynamic Signal   Active  No   Discharge Planning   Type of Residence Other (Comment)  ([facility] if approved to return, or SNF for strengthening to return if [facility] says it is needed.)   Patient expects to be discharged to: Assisted living   Condition of Participation: Discharge Planning   Freedom of Choice list was provided with basic dialogue that supports the patient's individualized plan of care/goals, treatment preferences, and shares the quality data associated with the providers? Yes  (preference if able is for patient to return to [facility].)     Chart reviewed and patient had been discharged to SNF at [facility] on 7/14.   [facility] confirmed to this cm that the patient discharged from them to [facility] on 8/10 (27 Medicare days used for SNF)  Cheyenne Settler said that when patient came back home (has lived there over a year) from the [facility], the pharmacist caught that the medication orders were not signed. She said that they had been trying to get them signed the last two days so she had not received any medication at the [facility] since she returned there 8/10. DON to be in touch with CM tomorrow as she will need to evaluate for ability to return to the [facility] now that she is rehospitalized. Readmission Assessment  Number of Days since last admission?: 8-30 days  Previous Disposition: SNF ([facility])  Who is being Interviewed: Caregiver ([address] [place] [address])  What was the patient's/caregiver's perception as to why they think they needed to return back to the hospital?: Other (Comment) (Medication orders from [facility] on 8/10 not signed. Patient had not had any medication since discharge from the [facility] on 8/10)  Did you visit your Primary Care Physician after you left the hospital, before you returned this time?: No  Why weren't you able to visit your PCP?: Did not have an appointment  Who advised the patient to return to the hospital?: Other Nurse (Navigator, CTN) ([facility]note [facility] due to seizures.)  In our efforts to provide the best possible care to you and others like you, can you think of anything that we could have done to help you after you left the hospital the first time, so that you might not have needed to return so soon?: Improved written discharge instructions, Discharge instructions that are concise, clear, and non contradictory        Transition of Care  RUR 16%  1- continue with medical management  2- [facility] DON to come evaluate for return to [facility] before being discharged  3- CM following to coordinate post acute care.   4- therapy evaluations will be needed for the DON eval  5- transportation at discharge TBD

## 2023-08-13 NOTE — ED NOTES
TRANSFER - OUT REPORT:    Verbal report given to [address] [place] [address] on [facility]  being transferred to 5th Floor for routine progression of patient care       Report consisted of patient's Situation, Background, Assessment and   Recommendations(SBAR). Information from the following report(s) ED Encounter Summary and ED SBAR was reviewed with the receiving nurse. Kelso Fall Assessment:                           Lines:   Peripheral IV 08/12/23 Left;Posterior Hand (Active)        Opportunity for questions and clarification was provided.       Patient transported with:  Registered Nurse           Cecilia Dalal RN  08/12/23 [facility]

## 2023-08-13 NOTE — PLAN OF CARE
Problem: Safety - Adult  Goal: Free from fall injury  Outcome: Progressing     Problem: Skin/Tissue Integrity  Goal: Absence of new skin breakdown  Description: 1. Monitor for areas of redness and/or skin breakdown  2. Assess vascular access sites hourly  3. Every 4-6 hours minimum:  Change oxygen saturation probe site  4. Every 4-6 hours:  If on nasal continuous positive airway pressure, respiratory therapy assess nares and determine need for appliance change or resting period.   Outcome: Progressing     Problem: Neurosensory - Adult  Goal: Achieves stable or improved neurological status  Outcome: Progressing  Goal: Absence of seizures  Outcome: Progressing  Goal: Remains free of injury related to seizures activity  Outcome: Progressing     Problem: Cardiovascular - Adult  Goal: Maintains optimal cardiac output and hemodynamic stability  Outcome: Progressing  Goal: Absence of cardiac dysrhythmias or at baseline  Outcome: Progressing     Problem: Skin/Tissue Integrity - Adult  Goal: Skin integrity remains intact  Outcome: Progressing     Problem: Metabolic/Fluid and Electrolytes - Adult  Goal: Electrolytes maintained within normal limits  Outcome: Progressing  Goal: Hemodynamic stability and optimal renal function maintained  Outcome: Progressing  Goal: Glucose maintained within prescribed range  Outcome: Progressing

## 2023-08-14 LAB
GLUCOSE BLD STRIP.AUTO-MCNC: 129 MG/DL (ref 65–117)
GLUCOSE BLD STRIP.AUTO-MCNC: 143 MG/DL (ref 65–117)
GLUCOSE BLD STRIP.AUTO-MCNC: 155 MG/DL (ref 65–117)
SERVICE CMNT-IMP: ABNORMAL

## 2023-08-14 PROCEDURE — 97535 SELF CARE MNGMENT TRAINING: CPT

## 2023-08-14 PROCEDURE — 1100000000 HC RM PRIVATE

## 2023-08-14 PROCEDURE — 6370000000 HC RX 637 (ALT 250 FOR IP): Performed by: INTERNAL MEDICINE

## 2023-08-14 PROCEDURE — 82962 GLUCOSE BLOOD TEST: CPT

## 2023-08-14 PROCEDURE — 97116 GAIT TRAINING THERAPY: CPT

## 2023-08-14 PROCEDURE — 97162 PT EVAL MOD COMPLEX 30 MIN: CPT

## 2023-08-14 PROCEDURE — 97165 OT EVAL LOW COMPLEX 30 MIN: CPT

## 2023-08-14 PROCEDURE — 6360000002 HC RX W HCPCS: Performed by: INTERNAL MEDICINE

## 2023-08-14 RX ORDER — ACETAMINOPHEN 325 MG/1
650 TABLET ORAL EVERY 4 HOURS PRN
Status: DISCONTINUED | OUTPATIENT
Start: 2023-08-14 | End: 2023-08-16 | Stop reason: HOSPADM

## 2023-08-14 RX ORDER — INSULIN LISPRO 100 [IU]/ML
0-4 INJECTION, SOLUTION INTRAVENOUS; SUBCUTANEOUS NIGHTLY
Status: DISCONTINUED | OUTPATIENT
Start: 2023-08-14 | End: 2023-08-16 | Stop reason: HOSPADM

## 2023-08-14 RX ORDER — INSULIN LISPRO 100 [IU]/ML
0-8 INJECTION, SOLUTION INTRAVENOUS; SUBCUTANEOUS
Status: DISCONTINUED | OUTPATIENT
Start: 2023-08-14 | End: 2023-08-16 | Stop reason: HOSPADM

## 2023-08-14 RX ADMIN — DONEPEZIL HYDROCHLORIDE 5 MG: 5 TABLET, FILM COATED ORAL at 20:30

## 2023-08-14 RX ADMIN — VENLAFAXINE HYDROCHLORIDE 150 MG: 150 CAPSULE, EXTENDED RELEASE ORAL at 08:31

## 2023-08-14 RX ADMIN — FENOFIBRATE 160 MG: 160 TABLET ORAL at 08:38

## 2023-08-14 RX ADMIN — LEVETIRACETAM 250 MG: 500 TABLET, FILM COATED ORAL at 20:30

## 2023-08-14 RX ADMIN — PANTOPRAZOLE SODIUM 40 MG: 40 TABLET, DELAYED RELEASE ORAL at 06:16

## 2023-08-14 RX ADMIN — CELECOXIB 200 MG: 100 CAPSULE ORAL at 08:31

## 2023-08-14 RX ADMIN — LEVETIRACETAM 250 MG: 500 TABLET, FILM COATED ORAL at 08:31

## 2023-08-14 RX ADMIN — BUSPIRONE HYDROCHLORIDE 10 MG: 10 TABLET ORAL at 08:31

## 2023-08-14 RX ADMIN — ENOXAPARIN SODIUM 40 MG: 100 INJECTION SUBCUTANEOUS at 08:31

## 2023-08-14 RX ADMIN — LEVOTHYROXINE SODIUM 88 MCG: 88 TABLET ORAL at 06:16

## 2023-08-14 RX ADMIN — ASPIRIN 81 MG: 81 TABLET, CHEWABLE ORAL at 08:31

## 2023-08-14 RX ADMIN — ATORVASTATIN CALCIUM 80 MG: 20 TABLET, FILM COATED ORAL at 08:31

## 2023-08-14 NOTE — PLAN OF CARE
Problem: Occupational Therapy - Adult  Goal: By Discharge: Performs self-care activities at highest level of function for planned discharge setting. See evaluation for individualized goals. Description: FUNCTIONAL STATUS PRIOR TO ADMISSION:  Patient unable to provide history. Per chart, patient in [facility]. , ADL Assistance: Needs assistance,  ,  ,  ,  , Toileting: Needs assistance, Homemaking Assistance: Needs assistance, Ambulation Assistance: Needs assistance, Transfer Assistance: Needs assistance, Active : No     HOME SUPPORT: Patient lived at assisted living facility. Occupational Therapy Goals:  Initiated 8/14/2023  1. Patient will perform lower body dressing with Contact Guard Assist within 7 day(s). 2.  Patient will perform grooming with Contact Guard Assist within 7 day(s). 3.  Patient will perform toilet transfers with Contact Guard Assist  within 7 day(s). 4.  Patient will perform all aspects of toileting with Contact Guard Assist within 7 day(s). 5.  Patient will participate in upper extremity therapeutic exercise/activities with Supervision for 10 minutes within 7 day(s). Outcome: Progressing   OCCUPATIONAL THERAPY EVALUATION    Patient: Homa Johnson (4 y.o. female)  Date: 8/14/2023  Primary Diagnosis: Seizure ([address]) [R56.9]         Precautions: Fall Risk, Seizure                  ASSESSMENT :  The patient is limited by decreased functional mobility, independence in ADLs, ROM, strength, cognition, attention/concentration, balance following hospital admission for seizure. Patient received supine in bed with HOB elevated. Patient agreeable to activity. She is unable to provide history due to current cognitive impairment. Patient follows commands with gentle verbal cues. Patient requires hand held assist x 2 for safe ambulation as she poorly uses RW, and requires hand over hand assist to maintain  on RW handles due to cognition.   Patient performs memory  Safety Judgement: Decreased awareness of need for safety;Decreased awareness of need for assistance  Insights: Decreased awareness of deficits    Skin: intact as seen    Edema: none noted     Hearing:   Hearing  Hearing: Within functional limits    Vision/Perceptual:          Vision  Vision: Within Functional Limits       Range of Motion:   AROM: Within functional limits         Strength:  Strength: Generally decreased, functional      Coordination:  Coordination: Within functional limits     Coordination: Generally decreased, functional      Tone & Sensation:   Tone: Normal  Sensation: Intact          Functional Mobility and Transfers for ADLs:    Bed Mobility:     Bed Mobility Training  Bed Mobility Training: Yes  Overall Level of Assistance: Contact-guard assistance; Additional time  Interventions: Safety awareness training;Verbal cues  Rolling: Contact-guard assistance; Additional time  Supine to Sit: Contact-guard assistance; Additional time  Scooting: Contact-guard assistance; Additional time    Transfers:     Transfer Training  Transfer Training: Yes  Overall Level of Assistance: Minimum assistance; Additional time  Interventions: Safety awareness training;Verbal cues  Sit to Stand: Stand-by assistance  Stand to Sit: Contact-guard assistance; Additional time  Stand Pivot Transfers: Contact-guard assistance; Additional time            Balance:  Standing: Impaired  Balance  Sitting: Intact  Standing: Impaired  Standing - Static: Good;Constant support  Standing - Dynamic: Constant support;Fair;Poor        ADL Assessment:          Feeding: Setup       Grooming: Contact guard assistance;Minimal assistance  Grooming Skilled Clinical Factors: balance at sink    UE Bathing: Minimal assistance       LE Bathing: Minimal assistance       UE Dressing: Minimal assistance       LE Dressing: Minimal assistance       Toileting: Minimal assistance              ADL Intervention and task modifications:      Pain Rating:  Does

## 2023-08-14 NOTE — CARE COORDINATION
8/14/2023   CARE MANAGEMENT NOTE:  CM reviewed EMR and handoff received from weekend  Ernesto ROSENBERG). Pt was admitted with seizure. Reportedly, pt resides at [address] [place] [address]. Lucia Acostaon  ([phone]) is the primary decision maker. RUR 16%    Transition Plan of Care:  Neurology following for medical management  PT/OT evals pending; await recs. Pt recently discharged from [address] [place] [address]  If SNF is warranted, pt will need to be sitter free x 24 hrs. Outpatient follow up  Mode of transport to be determined    CM will continue to follow pt for definitive discharge plan ie SNF vs return to [facility]

## 2023-08-14 NOTE — PROGRESS NOTES
[address] [place] [address]  [phone]      Hospitalist Progress Note      NAME: Kate Huitron   :  1945  MRM:  [id number]    Date of service: 2023  11:44 AM       Assessment and Plan:   Breakthrough seizures/ Hx of seizure. Recent admission in 2023 for right-sided weakness and aphasia with concerns for seizure. Discharged on low-dose Keppra 250 mg twice daily which apparently may not have been given at patient's facility prompting current seizure episode. Multiple episodes of seizure noted which prompted ER visit today. Continue Keppra. MRI of the brain is unremarkable. Neurology consultation appreciated. PT/OT evaluation    2. Type 2 diabetes mellitus. Hemoglobin A1c is 7.1. Cover with sliding scale    3. Dementia. Continue Aricept    4. Anxiety and depression. Continue BuSpar and Effexor    5. Hypothyroidism. Continue levothyroxine                                                                                                    Subjective:     Chief Complaint[de-identified] Patient was seen and examined as a follow up for seizure. Chart was reviewed. ROS:  (bold if positive, if negative)    Tolerating PT  Tolerating Diet        Objective:     Last 24hrs VS reviewed since prior progress note.  Most recent are:    Vitals:    23 1125   BP: (!) 142/77   Pulse: 60   Resp: 16   Temp:    SpO2: 97%     SpO2 Readings from Last 6 Encounters:   23 97%   23 99%          Intake/Output Summary (Last 24 hours) at 2023 1144  Last data filed at 2023 0857  Gross per 24 hour   Intake 150 ml   Output 400 ml   Net -250 ml        Physical Exam:    Gen:  Well-developed, well-nourished, in no acute distress  HEENT:  Pink conjunctivae, PERRL, hearing intact to voice, moist mucous membranes  Neck:  Supple, without masses, thyroid non-tender  Resp:  No accessory muscle use, clear breath sounds without wheezes rales or

## 2023-08-14 NOTE — PLAN OF CARE
Problem: Safety - Adult  Goal: Free from fall injury  Outcome: Progressing  Flowsheets (Taken 8/13/2023 0800 by Erlinda Rodgers RN)  Free From Fall Injury: Instruct family/caregiver on patient safety     Problem: Skin/Tissue Integrity  Goal: Absence of new skin breakdown  Description: 1. Monitor for areas of redness and/or skin breakdown  2. Assess vascular access sites hourly  3. Every 4-6 hours minimum:  Change oxygen saturation probe site  4. Every 4-6 hours:  If on nasal continuous positive airway pressure, respiratory therapy assess nares and determine need for appliance change or resting period. 8/13/2023 2030 by Mario Forman RN  Outcome: Progressing  8/13/2023 1514 by Erlinda Rodgers RN  Outcome: Progressing     Problem: Neurosensory - Adult  Goal: Achieves stable or improved neurological status  Outcome: Progressing  Flowsheets (Taken 8/13/2023 0800 by Erlinda Rodgers RN)  Achieves stable or improved neurological status: Assess for and report changes in neurological status  Goal: Absence of seizures  Outcome: Progressing  Flowsheets (Taken 8/13/2023 0800 by Erlinda Rodgers RN)  Absence of seizures: Monitor for seizure activity.   If seizure occurs, document type and location of movements and any associated apnea  Goal: Remains free of injury related to seizures activity  Outcome: Progressing     Problem: Cardiovascular - Adult  Goal: Maintains optimal cardiac output and hemodynamic stability  8/13/2023 2030 by Mario Forman RN  Outcome: Progressing  8/13/2023 1514 by Erlinda Rodgers RN  Outcome: Progressing  Flowsheets (Taken 8/13/2023 0800)  Maintains optimal cardiac output and hemodynamic stability: Monitor blood pressure and heart rate  Goal: Absence of cardiac dysrhythmias or at baseline  Outcome: Progressing     Problem: Skin/Tissue Integrity - Adult  Goal: Skin integrity remains intact  Outcome: Progressing  Flowsheets (Taken 8/13/2023 0800 by Erlinda Rodgers RN)  Skin Integrity Remains Intact: Monitor

## 2023-08-14 NOTE — PLAN OF CARE
Problem: Physical Therapy - Adult  Goal: By Discharge: Performs mobility at highest level of function for planned discharge setting. See evaluation for individualized goals. Description: FUNCTIONAL STATUS PRIOR TO ADMISSION: Unsure of pt level of assist on ALU PTA. Pt unable to state due to dementia    HOME SUPPORT PRIOR TO ADMISSION: The patient lived with [facility] staff. Physical Therapy Goals  Initiated 8/14/2023  1. Patient will move from supine to sit and sit to supine in bed with supervision/set-up within 7 day(s). 2.  Patient will perform sit to stand with supervision/set-up within 7 day(s). 3.  Patient will transfer from bed to chair and chair to bed with supervision/set-up using the least restrictive device within 7 day(s). 4.  Patient will ambulate with contact guard assist for 150 feet with the least restrictive device within 7 day(s). Outcome: Progressing   PHYSICAL THERAPY EVALUATION    Patient: Lester Galvin (1 y.o. female)  Date: 8/14/2023  Primary Diagnosis: Seizure ([address]) [R56.9]       Precautions: Fall Risk, Seizure                    ASSESSMENT :   DEFICITS/IMPAIRMENTS:   The patient is limited by decreased functional mobility, independence in ADLs, strength, activity tolerance, safety awareness, cognition, attention/concentration, balance. Pt admitted due to multiple seizures in ALU setting. Pt with hx of advanced Alz dementia. She is received supine in bed with rail bumpers in place. Pt talkative and upbeat, yet poor recall. Supine to sit with CGA. Impulsive with sit to stand. Unable to use RW properly therefore HHA x2 recommended. Pt is HIGH fall risk with compromised standing balance. She will need 24hr A due to these findings. Based on the impairments listed above pt will benefit from continued acute care PT tx. Patient will benefit from skilled intervention to address the above impairments. Functional Outcome Measure:   The patient scored 16/24 on the [employer] Carie

## 2023-08-15 LAB
GLUCOSE BLD STRIP.AUTO-MCNC: 116 MG/DL (ref 65–117)
GLUCOSE BLD STRIP.AUTO-MCNC: 120 MG/DL (ref 65–117)
GLUCOSE BLD STRIP.AUTO-MCNC: 133 MG/DL (ref 65–117)
SERVICE CMNT-IMP: ABNORMAL
SERVICE CMNT-IMP: ABNORMAL
SERVICE CMNT-IMP: NORMAL

## 2023-08-15 PROCEDURE — 6360000002 HC RX W HCPCS: Performed by: INTERNAL MEDICINE

## 2023-08-15 PROCEDURE — 97530 THERAPEUTIC ACTIVITIES: CPT

## 2023-08-15 PROCEDURE — 6370000000 HC RX 637 (ALT 250 FOR IP): Performed by: INTERNAL MEDICINE

## 2023-08-15 PROCEDURE — 97535 SELF CARE MNGMENT TRAINING: CPT

## 2023-08-15 PROCEDURE — 82962 GLUCOSE BLOOD TEST: CPT

## 2023-08-15 PROCEDURE — 97116 GAIT TRAINING THERAPY: CPT

## 2023-08-15 PROCEDURE — 1100000000 HC RM PRIVATE

## 2023-08-15 RX ORDER — DEXTROSE MONOHYDRATE 100 MG/ML
INJECTION, SOLUTION INTRAVENOUS CONTINUOUS PRN
Status: DISCONTINUED | OUTPATIENT
Start: 2023-08-15 | End: 2023-08-16 | Stop reason: HOSPADM

## 2023-08-15 RX ADMIN — ENOXAPARIN SODIUM 40 MG: 100 INJECTION SUBCUTANEOUS at 09:27

## 2023-08-15 RX ADMIN — LEVOTHYROXINE SODIUM 88 MCG: 88 TABLET ORAL at 06:26

## 2023-08-15 RX ADMIN — VENLAFAXINE HYDROCHLORIDE 150 MG: 150 CAPSULE, EXTENDED RELEASE ORAL at 09:25

## 2023-08-15 RX ADMIN — BUSPIRONE HYDROCHLORIDE 10 MG: 10 TABLET ORAL at 09:26

## 2023-08-15 RX ADMIN — CELECOXIB 200 MG: 100 CAPSULE ORAL at 09:25

## 2023-08-15 RX ADMIN — DONEPEZIL HYDROCHLORIDE 5 MG: 5 TABLET, FILM COATED ORAL at 21:53

## 2023-08-15 RX ADMIN — LEVETIRACETAM 250 MG: 500 TABLET, FILM COATED ORAL at 21:54

## 2023-08-15 RX ADMIN — FENOFIBRATE 160 MG: 160 TABLET ORAL at 09:26

## 2023-08-15 RX ADMIN — ASPIRIN 81 MG: 81 TABLET, CHEWABLE ORAL at 09:25

## 2023-08-15 RX ADMIN — LEVETIRACETAM 250 MG: 500 TABLET, FILM COATED ORAL at 09:26

## 2023-08-15 RX ADMIN — PANTOPRAZOLE SODIUM 40 MG: 40 TABLET, DELAYED RELEASE ORAL at 06:25

## 2023-08-15 RX ADMIN — ATORVASTATIN CALCIUM 80 MG: 20 TABLET, FILM COATED ORAL at 09:25

## 2023-08-15 ASSESSMENT — PAIN SCALES - PAIN ASSESSMENT IN ADVANCED DEMENTIA (PAINAD)
NEGVOCALIZATION: 0
NEGVOCALIZATION: 0
BREATHING: 0
NEGVOCALIZATION: 0
FACIALEXPRESSION: 0
TOTALSCORE: 0
CONSOLABILITY: 0
BREATHING: 0
BODYLANGUAGE: 0
BREATHING: 0
BODYLANGUAGE: 0
CONSOLABILITY: 0
FACIALEXPRESSION: 0
TOTALSCORE: 0
TOTALSCORE: 0
FACIALEXPRESSION: 0
BODYLANGUAGE: 0
CONSOLABILITY: 0

## 2023-08-15 ASSESSMENT — PAIN SCALES - GENERAL: PAINLEVEL_OUTOF10: 0

## 2023-08-15 NOTE — PLAN OF CARE
Problem: Physical Therapy - Adult  Goal: By Discharge: Performs mobility at highest level of function for planned discharge setting. See evaluation for individualized goals. Description: FUNCTIONAL STATUS PRIOR TO ADMISSION: Unsure of pt level of assist on ALU PTA. Pt unable to state due to dementia    HOME SUPPORT PRIOR TO ADMISSION: The patient lived with [facility] staff. Physical Therapy Goals  Initiated 8/14/2023  1. Patient will move from supine to sit and sit to supine in bed with supervision/set-up within 7 day(s). 2.  Patient will perform sit to stand with supervision/set-up within 7 day(s). 3.  Patient will transfer from bed to chair and chair to bed with supervision/set-up using the least restrictive device within 7 day(s). 4.  Patient will ambulate with contact guard assist for 150 feet with the least restrictive device within 7 day(s). Outcome: Progressing   PHYSICAL THERAPY TREATMENT    Patient: Judith Gaines (40 y.o. female)  Date: 8/15/2023  Diagnosis: Seizure ([address]) [R56.9] Seizure ([address])      Precautions: Fall Risk, Seizure                    ASSESSMENT:  Patient continues to benefit from skilled PT services. Pt seen this AM.Pt is confused but pleasant. Pt supine to sit with min assist of 2. Pt sit to stand with min assist of 2. Pt ambulated 40ft in room with hand held min to mod assist of 2. Pt is very unsteady leaning to the posterior. Pt was assisted in restroom before back to bed min assist.Pt progress slow. Continue goals. PLAN:  Patient continues to benefit from skilled intervention to address the above impairments. Continue treatment per established plan of care. Recommendation for discharge: (in order for the patient to meet his/her long term goals):  Therapy up to 5 days/week in Skilled nursing facility    Other factors to consider for discharge:       IF patient discharges home will need the following DME:  TBD       SUBJECTIVE:       OBJECTIVE DATA SUMMARY:   Critical

## 2023-08-15 NOTE — CARE COORDINATION
8/15/2023   CARE MANAGEMENT NOTE:  CM reviewed EMR for clinical updates. Pt was admitted with seizure. Reportedly, pt resides at [address] [place] [address]. [facility]  ([phone]) is the primary decision maker. RUR 15%     Transition Plan of Care:  Neurology following for medical management  PT/OT evals complete;  pt was CGA with bed mobility and transfers on 8/14 and SNF was recommended. Pt recently discharged from [address] [place] [address] so she may be at baseline  Liaison from [facility] completed on-site eval of pt this afternoon and pt has been approved to return   Aleshia Mary was removed on 8/15 at 12 noon  Outpatient follow up  Guardian will transport pt with advanced notice     CM will continue to follow pt until discharged.   Rosalina

## 2023-08-16 VITALS
BODY MASS INDEX: 24.45 KG/M2 | SYSTOLIC BLOOD PRESSURE: 118 MMHG | TEMPERATURE: 98.4 F | OXYGEN SATURATION: 95 % | DIASTOLIC BLOOD PRESSURE: 66 MMHG | WEIGHT: 138 LBS | HEART RATE: 74 BPM | RESPIRATION RATE: 17 BRPM | HEIGHT: 63 IN

## 2023-08-16 LAB
GLUCOSE BLD STRIP.AUTO-MCNC: 126 MG/DL (ref 65–117)
GLUCOSE BLD STRIP.AUTO-MCNC: 130 MG/DL (ref 65–117)
SERVICE CMNT-IMP: ABNORMAL
SERVICE CMNT-IMP: ABNORMAL

## 2023-08-16 PROCEDURE — 6360000002 HC RX W HCPCS: Performed by: INTERNAL MEDICINE

## 2023-08-16 PROCEDURE — 82962 GLUCOSE BLOOD TEST: CPT

## 2023-08-16 PROCEDURE — 6370000000 HC RX 637 (ALT 250 FOR IP): Performed by: INTERNAL MEDICINE

## 2023-08-16 RX ADMIN — PANTOPRAZOLE SODIUM 40 MG: 40 TABLET, DELAYED RELEASE ORAL at 06:15

## 2023-08-16 RX ADMIN — BUSPIRONE HYDROCHLORIDE 10 MG: 10 TABLET ORAL at 08:30

## 2023-08-16 RX ADMIN — VENLAFAXINE HYDROCHLORIDE 150 MG: 150 CAPSULE, EXTENDED RELEASE ORAL at 08:30

## 2023-08-16 RX ADMIN — ASPIRIN 81 MG: 81 TABLET, CHEWABLE ORAL at 08:30

## 2023-08-16 RX ADMIN — LEVOTHYROXINE SODIUM 88 MCG: 88 TABLET ORAL at 06:15

## 2023-08-16 RX ADMIN — LEVETIRACETAM 250 MG: 500 TABLET, FILM COATED ORAL at 08:30

## 2023-08-16 RX ADMIN — ATORVASTATIN CALCIUM 80 MG: 20 TABLET, FILM COATED ORAL at 08:30

## 2023-08-16 RX ADMIN — FENOFIBRATE 160 MG: 160 TABLET ORAL at 08:30

## 2023-08-16 RX ADMIN — ENOXAPARIN SODIUM 40 MG: 100 INJECTION SUBCUTANEOUS at 08:30

## 2023-08-16 RX ADMIN — CELECOXIB 200 MG: 100 CAPSULE ORAL at 08:30

## 2023-08-16 NOTE — DISCHARGE SUMMARY
Hospitalist Discharge Summary     Patient ID:    Kathryn Leong  [id number]  3 y.o.  1945    Admit date of service: 8/12/2023    Discharge date of service: 8/16/2023    Admission Diagnoses: Seizure ([address]) [R56.9]    Chronic Diagnoses:      Discharge Medications:   Current Discharge Medication List        CONTINUE these medications which have NOT CHANGED    Details   aspirin 81 MG chewable tablet Take 1 tablet by mouth daily  Qty: 30 tablet, Refills: 0      levETIRAcetam (KEPPRA) 250 MG tablet Take 1 tablet by mouth 2 times daily  Qty: 60 tablet, Refills: 0      atorvastatin (LIPITOR) 40 MG tablet Take 1 tablet by mouth nightly      buPROPion (WELLBUTRIN SR) 150 MG extended release tablet Take 1 tablet by mouth daily      levothyroxine (SYNTHROID) 88 MCG tablet Take 1 tablet by mouth Daily      acetaminophen (TYLENOL) 325 MG tablet Take 2 tablets by mouth every 4 hours as needed      busPIRone (BUSPAR) 10 MG tablet Take 1 tablet by mouth daily      choline fenofibrate (TRILIPIX) 135 MG CPDR delayed release capsule Take 1 capsule by mouth daily      donepezil (ARICEPT) 5 MG tablet Take 1 tablet by mouth nightly for mild to moderate Alzheimer's type Dementia.      melatonin 5 MG TABS tablet Take 2 tablets by mouth      omeprazole (PRILOSEC) 40 MG delayed release capsule Take 1 capsule by mouth daily      venlafaxine (EFFEXOR XR) 150 MG extended release capsule Take 1 capsule by mouth daily           STOP taking these medications       celecoxib (CELEBREX) 200 MG capsule Comments:   Reason for Stopping:         neomycin-bacitracin-polymyxin (NEOSPORIN) 3.5-400-5000 OINT ointment Comments:   Reason for Stopping:         buPROPion (WELLBUTRIN XL) 150 MG extended release tablet Comments:   Reason for Stopping:         celecoxib (CELEBREX) 200 MG capsule Comments:   Reason for Stopping: Follow up Care:    1. No follow-up provider specified. in 1-2 weeks  2.       Diet:  regular

## 2023-08-16 NOTE — DISCHARGE INSTRUCTIONS
ACUTE DIAGNOSES:  Seizure ([address] [facility]) [R56.9]    CHRONIC MEDICAL DIAGNOSES:  [unfilled]    DISCHARGE MEDICATIONS:   [unfilled]    It is important that you take the medication exactly as they are prescribed. Keep your medication in the bottles provided by the pharmacist and keep a list of the medication names, dosages, and times to be taken in your wallet. Do not take other medications without consulting your doctor. DIET:  regular diet    ACTIVITY: activity as tolerated    ADDITIONAL INFORMATION: If you experience any of the following symptoms then please call your primary care physician or return to the emergency room if you cannot get hold of your doctor: Fever, chills, nausea, vomiting, diarrhea, change in mentation, falling, bleeding, shortness of breath. FOLLOW UP CARE:   @[facility]@  you are to call and set up an appointment to see them in 5 days. Follow-up  No follow-up provider specified. Information obtained by :  I understand that if any problems occur once I am at home I am to contact my physician. I understand and acknowledge receipt of the instructions indicated above.                                                                                                                                            Physician's or R.N.'s Signature                                                                  Date/Time                                                                                                                                              Patient or Representative Signature                                                          Date/Time

## 2023-08-16 NOTE — CARE COORDINATION
8/16/2023   CARE MANAGEMENT NOTE:  CM reviewed EMR for clinical updates. Pt was admitted with seizure. Reportedly, pt resides at [address] [place] [address]. Yash Melvin  ([phone]) is the primary decision maker. RUR 12%     Transition Plan of Care:  Neurology following for medical management  Liaison from [facility] completed on-site eval of pt on 8/15 and she has been approved to return. RN, please call report to [phone]  No Covid testing required  Outpatient follow up  Guardian/son will transport pt with advanced notice     CM will continue to follow pt until discharged.   Rosalina

## 2023-09-27 ENCOUNTER — HOSPITAL ENCOUNTER (EMERGENCY)
Facility: HOSPITAL | Age: 78
Discharge: HOME | End: 2023-09-27
Attending: EMERGENCY MEDICINE
Payer: MEDICARE

## 2023-09-27 ENCOUNTER — APPOINTMENT (OUTPATIENT)
Facility: HOSPITAL | Age: 78
End: 2023-09-27
Payer: MEDICARE

## 2023-09-27 VITALS
SYSTOLIC BLOOD PRESSURE: 109 MMHG | HEIGHT: 63 IN | OXYGEN SATURATION: 100 % | BODY MASS INDEX: 24.45 KG/M2 | DIASTOLIC BLOOD PRESSURE: 63 MMHG | RESPIRATION RATE: 18 BRPM | WEIGHT: 138 LBS | HEART RATE: 69 BPM | TEMPERATURE: 98.1 F

## 2023-09-27 DIAGNOSIS — G40.919 BREAKTHROUGH SEIZURE (HCC): Primary | ICD-10-CM

## 2023-09-27 LAB
ALBUMIN SERPL-MCNC: 3.5 G/DL (ref 3.5–5)
ALBUMIN/GLOB SERPL: 1.1 (ref 1.1–2.2)
ALP SERPL-CCNC: 59 U/L (ref 45–117)
ALT SERPL-CCNC: 26 U/L (ref 12–78)
ANION GAP SERPL CALC-SCNC: 5 MMOL/L (ref 5–15)
APPEARANCE UR: CLEAR
AST SERPL-CCNC: 27 U/L (ref 15–37)
BACTERIA URNS QL MICRO: NEGATIVE /HPF
BASOPHILS # BLD: 0 K/UL (ref 0–0.1)
BASOPHILS NFR BLD: 1 % (ref 0–1)
BILIRUB SERPL-MCNC: 0.4 MG/DL (ref 0.2–1)
BILIRUB UR QL: NEGATIVE
BUN SERPL-MCNC: 16 MG/DL (ref 6–20)
BUN/CREAT SERPL: 15 (ref 12–20)
CALCIUM SERPL-MCNC: 9 MG/DL (ref 8.5–10.1)
CHLORIDE SERPL-SCNC: 107 MMOL/L (ref 97–108)
CO2 SERPL-SCNC: 29 MMOL/L (ref 21–32)
COLOR UR: ABNORMAL
COMMENT:: NORMAL
CREAT SERPL-MCNC: 1.06 MG/DL (ref 0.55–1.02)
DIFFERENTIAL METHOD BLD: ABNORMAL
EKG ATRIAL RATE: 73 BPM
EKG DIAGNOSIS: NORMAL
EKG P AXIS: 82 DEGREES
EKG P-R INTERVAL: 160 MS
EKG Q-T INTERVAL: 390 MS
EKG QRS DURATION: 80 MS
EKG QTC CALCULATION (BAZETT): 429 MS
EKG R AXIS: -17 DEGREES
EKG T AXIS: 44 DEGREES
EKG VENTRICULAR RATE: 73 BPM
EOSINOPHIL # BLD: 0.2 K/UL (ref 0–0.4)
EOSINOPHIL NFR BLD: 3 % (ref 0–7)
EPITH CASTS URNS QL MICRO: ABNORMAL /LPF
ERYTHROCYTE [DISTWIDTH] IN BLOOD BY AUTOMATED COUNT: 14.7 % (ref 11.5–14.5)
GLOBULIN SER CALC-MCNC: 3.1 G/DL (ref 2–4)
GLUCOSE SERPL-MCNC: 111 MG/DL (ref 65–100)
GLUCOSE UR STRIP.AUTO-MCNC: NEGATIVE MG/DL
HCT VFR BLD AUTO: 37.4 % (ref 35–47)
HGB BLD-MCNC: 12 G/DL (ref 11.5–16)
HGB UR QL STRIP: NEGATIVE
HYALINE CASTS URNS QL MICRO: ABNORMAL /LPF (ref 0–2)
IMM GRANULOCYTES # BLD AUTO: 0 K/UL (ref 0–0.04)
IMM GRANULOCYTES NFR BLD AUTO: 1 % (ref 0–0.5)
KETONES UR QL STRIP.AUTO: NEGATIVE MG/DL
LEUKOCYTE ESTERASE UR QL STRIP.AUTO: ABNORMAL
LYMPHOCYTES # BLD: 0.9 K/UL (ref 0.8–3.5)
LYMPHOCYTES NFR BLD: 18 % (ref 12–49)
MAGNESIUM SERPL-MCNC: 2 MG/DL (ref 1.6–2.4)
MCH RBC QN AUTO: 28.7 PG (ref 26–34)
MCHC RBC AUTO-ENTMCNC: 32.1 G/DL (ref 30–36.5)
MCV RBC AUTO: 89.5 FL (ref 80–99)
MONOCYTES # BLD: 0.4 K/UL (ref 0–1)
MONOCYTES NFR BLD: 9 % (ref 5–13)
NEUTS SEG # BLD: 3.4 K/UL (ref 1.8–8)
NEUTS SEG NFR BLD: 68 % (ref 32–75)
NITRITE UR QL STRIP.AUTO: NEGATIVE
NRBC # BLD: 0 K/UL (ref 0–0.01)
NRBC BLD-RTO: 0 PER 100 WBC
PH UR STRIP: 6.5 (ref 5–8)
PLATELET # BLD AUTO: 278 K/UL (ref 150–400)
PMV BLD AUTO: 10 FL (ref 8.9–12.9)
POTASSIUM SERPL-SCNC: 4 MMOL/L (ref 3.5–5.1)
PROT SERPL-MCNC: 6.6 G/DL (ref 6.4–8.2)
PROT UR STRIP-MCNC: NEGATIVE MG/DL
RBC # BLD AUTO: 4.18 M/UL (ref 3.8–5.2)
RBC #/AREA URNS HPF: ABNORMAL /HPF (ref 0–5)
SODIUM SERPL-SCNC: 141 MMOL/L (ref 136–145)
SP GR UR REFRACTOMETRY: 1.01 (ref 1–1.03)
SPECIMEN HOLD: NORMAL
TROPONIN I SERPL HS-MCNC: 5 NG/L (ref 0–51)
URINE CULTURE IF INDICATED: ABNORMAL
UROBILINOGEN UR QL STRIP.AUTO: 1 EU/DL (ref 0.2–1)
WBC # BLD AUTO: 4.9 K/UL (ref 3.6–11)
WBC URNS QL MICRO: ABNORMAL /HPF (ref 0–4)

## 2023-09-27 PROCEDURE — 84484 ASSAY OF TROPONIN QUANT: CPT

## 2023-09-27 PROCEDURE — 93010 ELECTROCARDIOGRAM REPORT: CPT | Performed by: STUDENT IN AN ORGANIZED HEALTH CARE EDUCATION/TRAINING PROGRAM

## 2023-09-27 PROCEDURE — 96374 THER/PROPH/DIAG INJ IV PUSH: CPT

## 2023-09-27 PROCEDURE — 99285 EMERGENCY DEPT VISIT HI MDM: CPT

## 2023-09-27 PROCEDURE — 87086 URINE CULTURE/COLONY COUNT: CPT

## 2023-09-27 PROCEDURE — 80053 COMPREHEN METABOLIC PANEL: CPT

## 2023-09-27 PROCEDURE — 6360000002 HC RX W HCPCS: Performed by: EMERGENCY MEDICINE

## 2023-09-27 PROCEDURE — 85025 COMPLETE CBC W/AUTO DIFF WBC: CPT

## 2023-09-27 PROCEDURE — 81001 URINALYSIS AUTO W/SCOPE: CPT

## 2023-09-27 PROCEDURE — 83735 ASSAY OF MAGNESIUM: CPT

## 2023-09-27 PROCEDURE — 36415 COLL VENOUS BLD VENIPUNCTURE: CPT

## 2023-09-27 PROCEDURE — 93005 ELECTROCARDIOGRAM TRACING: CPT | Performed by: EMERGENCY MEDICINE

## 2023-09-27 PROCEDURE — 71045 X-RAY EXAM CHEST 1 VIEW: CPT

## 2023-09-27 RX ORDER — LEVETIRACETAM 500 MG/5ML
1000 INJECTION, SOLUTION, CONCENTRATE INTRAVENOUS EVERY 12 HOURS
Status: DISCONTINUED | OUTPATIENT
Start: 2023-09-27 | End: 2023-09-27 | Stop reason: HOSPADM

## 2023-09-27 RX ORDER — LEVETIRACETAM 250 MG/1
500 TABLET ORAL 2 TIMES DAILY
Qty: 120 TABLET | Refills: 0 | Status: SHIPPED | OUTPATIENT
Start: 2023-09-27 | End: 2023-10-27

## 2023-09-27 RX ADMIN — LEVETIRACETAM 1000 MG: 100 INJECTION INTRAVENOUS at 13:51

## 2023-09-27 ASSESSMENT — PAIN - FUNCTIONAL ASSESSMENT: PAIN_FUNCTIONAL_ASSESSMENT: NONE - DENIES PAIN

## 2023-09-27 NOTE — ED PROVIDER NOTES
[facility] EMERGENCY DEPT  EMERGENCY DEPARTMENT ENCOUNTER      Pt Name: Lauren White  MRN: [id number]  [address] 1945  Date of evaluation: 9/27/2023  Provider: Raphael Rasmussen MD    [address]       Chief Complaint   Patient presents with    Seizures         HISTORY OF PRESENT ILLNESS   (Location/Symptom, Timing/Onset, Context/Setting, Quality, Duration, Modifying Factors, Severity)  Note limiting factors. Patient is a pleasant 68-year-old female with past medical history significant for dementia and seizure disorder who presents via EMS for apparent seizure-like activity at her facility. Per EMS there was no further seizure-like activity for them. Per chart patient had recent admission for similar seizure activity and was admitted to the hospital for further observation. She had a negative MRI per discharge summary. Patient was to be taking 250 mg of Keppra twice daily per discharge instructions. Due to her dementia patient is unable sure if she has been given those medicines or not. She states that she thinks she went to a different hospital fairly recently for some chest discomfort. Notes that she feels a bit nauseated at this time but denies any other complaint. The history is provided by the patient, the EMS personnel and medical records. History limited by: dementia. Review of External Medical Records:     Nursing Notes were reviewed. REVIEW OF SYSTEMS    (2-9 systems for level 4, 10 or more for level 5)     Review of Systems   Unable to perform ROS: Dementia   Constitutional:  Negative for fever. Gastrointestinal:  Positive for nausea. Neurological:  Positive for seizures. Except as noted above the remainder of the review of systems was reviewed and negative.        PAST MEDICAL HISTORY     Past Medical History:   Diagnosis Date    Depression     DM (diabetes mellitus) ([address])     Hypercholesterolemia     Hypothyroid     KIKA on CPAP     Dr. Isabel King

## 2023-09-27 NOTE — ED PROVIDER NOTES
Care assumed from Dr. Mendel Romano at 4 PM.  Please see her note for full H&P.  68-year-old female with history of dementia and seizures presents after witnessed breakthrough seizure. Plan to increase Keppra, rx sent. Awaiting UA at time of signout    UA returned showing significant epithelial contamination but no clear evidence of UTI. She was discharged home back to her facility.      Balaji Bella DO  09/27/23 0359

## 2023-09-27 NOTE — DISCHARGE INSTRUCTIONS
Please increase the Keppra to 500 mg twice a day and follow closely with neurology. Return to the emergency department for any concern.

## 2023-09-27 NOTE — ED TRIAGE NOTES
Patient to ER via EMS for complaints from nursing staff of \"seizure like activity. \" EMS reports no witnessed seizure activity during transport. Patient has hx of dementia. Seizure precautions in place during triage. Patient is alert and oriented x 2 during triage.

## 2023-09-28 LAB
BACTERIA SPEC CULT: NORMAL
SERVICE CMNT-IMP: NORMAL

## 2023-09-28 ASSESSMENT — ENCOUNTER SYMPTOMS: NAUSEA: 1

## 2023-09-28 NOTE — ED NOTES
Discharge instructions provided. Pt verbalized understanding. Opportunity provided for questions. Pt discharged home. H2H Arrive to take patient back to facility.       Erika Albright RN  09/27/23 2012

## 2023-10-11 ENCOUNTER — HOSPITAL ENCOUNTER (EMERGENCY)
Facility: HOSPITAL | Age: 78
Discharge: NURSING, REHAB OR LONG-TERM CARE | End: 2023-10-12
Attending: STUDENT IN AN ORGANIZED HEALTH CARE EDUCATION/TRAINING PROGRAM
Payer: MEDICARE

## 2023-10-11 ENCOUNTER — APPOINTMENT (OUTPATIENT)
Facility: HOSPITAL | Age: 78
End: 2023-10-11
Payer: MEDICARE

## 2023-10-11 VITALS
BODY MASS INDEX: 24.8 KG/M2 | DIASTOLIC BLOOD PRESSURE: 62 MMHG | HEART RATE: 78 BPM | HEIGHT: 63 IN | SYSTOLIC BLOOD PRESSURE: 113 MMHG | WEIGHT: 140 LBS | TEMPERATURE: 99.9 F | RESPIRATION RATE: 16 BRPM | OXYGEN SATURATION: 98 %

## 2023-10-11 DIAGNOSIS — R26.81 GAIT INSTABILITY: ICD-10-CM
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
## 2023-10-11 DIAGNOSIS — E86.0 DEHYDRATION: ICD-10-CM

## 2023-10-11 DIAGNOSIS — S09.90XA INJURY OF HEAD, INITIAL ENCOUNTER: ICD-10-CM

## 2023-10-11 LAB
ALBUMIN SERPL-MCNC: 2.9 G/DL (ref 3.5–5)
ALBUMIN/GLOB SERPL: 0.8 (ref 1.1–2.2)
ALP SERPL-CCNC: 65 U/L (ref 45–117)
ALT SERPL-CCNC: 23 U/L (ref 12–78)
ANION GAP SERPL CALC-SCNC: 4 MMOL/L (ref 5–15)
APPEARANCE UR: ABNORMAL
AST SERPL-CCNC: 31 U/L (ref 15–37)
BACTERIA URNS QL MICRO: NEGATIVE /HPF
BILIRUB SERPL-MCNC: 0.3 MG/DL (ref 0.2–1)
BILIRUB UR QL: NEGATIVE
BUN SERPL-MCNC: 18 MG/DL (ref 6–20)
BUN/CREAT SERPL: 20 (ref 12–20)
CALCIUM SERPL-MCNC: 8.8 MG/DL (ref 8.5–10.1)
CHLORIDE SERPL-SCNC: 106 MMOL/L (ref 97–108)
CO2 SERPL-SCNC: 27 MMOL/L (ref 21–32)
COLOR UR: ABNORMAL
COMMENT:: NORMAL
CREAT SERPL-MCNC: 0.9 MG/DL (ref 0.55–1.02)
EPITH CASTS URNS QL MICRO: ABNORMAL /LPF
GLOBULIN SER CALC-MCNC: 3.5 G/DL (ref 2–4)
GLUCOSE SERPL-MCNC: 156 MG/DL (ref 65–100)
GLUCOSE UR STRIP.AUTO-MCNC: NEGATIVE MG/DL
HGB UR QL STRIP: NEGATIVE
HYALINE CASTS URNS QL MICRO: ABNORMAL /LPF (ref 0–2)
KETONES UR QL STRIP.AUTO: NEGATIVE MG/DL
LEUKOCYTE ESTERASE UR QL STRIP.AUTO: NEGATIVE
MAGNESIUM SERPL-MCNC: 1.8 MG/DL (ref 1.6–2.4)
NITRITE UR QL STRIP.AUTO: NEGATIVE
PH UR STRIP: 6.5 (ref 5–8)
POTASSIUM SERPL-SCNC: 3.7 MMOL/L (ref 3.5–5.1)
PROT SERPL-MCNC: 6.4 G/DL (ref 6.4–8.2)
PROT UR STRIP-MCNC: NEGATIVE MG/DL
RBC #/AREA URNS HPF: ABNORMAL /HPF (ref 0–5)
SODIUM SERPL-SCNC: 137 MMOL/L (ref 136–145)
SP GR UR REFRACTOMETRY: 1.02 (ref 1–1.03)
SPECIMEN HOLD: NORMAL
URINE CULTURE IF INDICATED: ABNORMAL
UROBILINOGEN UR QL STRIP.AUTO: 2 EU/DL (ref 0.2–1)
WBC URNS QL MICRO: ABNORMAL /HPF (ref 0–4)

## 2023-10-11 PROCEDURE — 85025 COMPLETE CBC W/AUTO DIFF WBC: CPT

## 2023-10-11 PROCEDURE — 83735 ASSAY OF MAGNESIUM: CPT

## 2023-10-11 PROCEDURE — 80053 COMPREHEN METABOLIC PANEL: CPT

## 2023-10-11 PROCEDURE — 80177 DRUG SCRN QUAN LEVETIRACETAM: CPT

## 2023-10-11 PROCEDURE — 70450 CT HEAD/BRAIN W/O DYE: CPT

## 2023-10-11 PROCEDURE — 72125 CT NECK SPINE W/O DYE: CPT

## 2023-10-11 PROCEDURE — 81001 URINALYSIS AUTO W/SCOPE: CPT

## 2023-10-11 PROCEDURE — 36415 COLL VENOUS BLD VENIPUNCTURE: CPT

## 2023-10-11 PROCEDURE — 99284 EMERGENCY DEPT VISIT MOD MDM: CPT

## 2023-10-11 RX ORDER — 0.9 % SODIUM CHLORIDE 0.9 %
1000 INTRAVENOUS SOLUTION INTRAVENOUS ONCE
Status: COMPLETED | OUTPATIENT
Start: 2023-10-11 | End: 2023-10-12

## 2023-10-11 ASSESSMENT — PAIN - FUNCTIONAL ASSESSMENT: PAIN_FUNCTIONAL_ASSESSMENT: 0-10

## 2023-10-11 ASSESSMENT — PAIN SCALES - GENERAL: PAINLEVEL_OUTOF10: 0

## 2023-10-12 ENCOUNTER — HOSPITAL ENCOUNTER (EMERGENCY)
Facility: HOSPITAL | Age: 78
Discharge: HOME | End: 2023-10-12
Attending: STUDENT IN AN ORGANIZED HEALTH CARE EDUCATION/TRAINING PROGRAM
Payer: MEDICARE

## 2023-10-12 ENCOUNTER — APPOINTMENT (OUTPATIENT)
Facility: HOSPITAL | Age: 78
End: 2023-10-12
Payer: MEDICARE

## 2023-10-12 VITALS
RESPIRATION RATE: 18 BRPM | HEIGHT: 63 IN | WEIGHT: 140 LBS | BODY MASS INDEX: 24.8 KG/M2 | SYSTOLIC BLOOD PRESSURE: 131 MMHG | DIASTOLIC BLOOD PRESSURE: 68 MMHG | TEMPERATURE: 99.1 F | OXYGEN SATURATION: 95 % | HEART RATE: 78 BPM

## 2023-10-12 DIAGNOSIS — R53.1 GENERALIZED WEAKNESS: Primary | ICD-10-CM

## 2023-10-12 DIAGNOSIS — R79.89 LOW TSH LEVEL: ICD-10-CM

## 2023-10-12 LAB
ALBUMIN SERPL-MCNC: 2.7 G/DL (ref 3.5–5)
ALBUMIN/GLOB SERPL: 0.8 (ref 1.1–2.2)
ALP SERPL-CCNC: 60 U/L (ref 45–117)
ALT SERPL-CCNC: 24 U/L (ref 12–78)
ANION GAP SERPL CALC-SCNC: 6 MMOL/L (ref 5–15)
AST SERPL-CCNC: 45 U/L (ref 15–37)
BASOPHILS # BLD: 0 K/UL (ref 0–0.1)
BASOPHILS # BLD: 0 K/UL (ref 0–0.1)
BASOPHILS NFR BLD: 0 % (ref 0–1)
BASOPHILS NFR BLD: 0 % (ref 0–1)
BILIRUB SERPL-MCNC: 0.4 MG/DL (ref 0.2–1)
BUN SERPL-MCNC: 17 MG/DL (ref 6–20)
BUN/CREAT SERPL: 19 (ref 12–20)
CALCIUM SERPL-MCNC: 8.4 MG/DL (ref 8.5–10.1)
CHLORIDE SERPL-SCNC: 106 MMOL/L (ref 97–108)
CO2 SERPL-SCNC: 25 MMOL/L (ref 21–32)
COMMENT:: NORMAL
CREAT SERPL-MCNC: 0.9 MG/DL (ref 0.55–1.02)
DIFFERENTIAL METHOD BLD: ABNORMAL
DIFFERENTIAL METHOD BLD: ABNORMAL
EKG ATRIAL RATE: 75 BPM
EKG DIAGNOSIS: NORMAL
EKG P AXIS: 24 DEGREES
EKG P-R INTERVAL: 158 MS
EKG Q-T INTERVAL: 390 MS
EKG QRS DURATION: 82 MS
EKG QTC CALCULATION (BAZETT): 435 MS
EKG R AXIS: -9 DEGREES
EKG T AXIS: 30 DEGREES
EKG VENTRICULAR RATE: 75 BPM
EOSINOPHIL # BLD: 0 K/UL (ref 0–0.4)
EOSINOPHIL # BLD: 0 K/UL (ref 0–0.4)
EOSINOPHIL NFR BLD: 0 % (ref 0–7)
EOSINOPHIL NFR BLD: 0 % (ref 0–7)
ERYTHROCYTE [DISTWIDTH] IN BLOOD BY AUTOMATED COUNT: 14.6 % (ref 11.5–14.5)
ERYTHROCYTE [DISTWIDTH] IN BLOOD BY AUTOMATED COUNT: 14.9 % (ref 11.5–14.5)
GLOBULIN SER CALC-MCNC: 3.5 G/DL (ref 2–4)
GLUCOSE SERPL-MCNC: 146 MG/DL (ref 65–100)
HCT VFR BLD AUTO: 30.8 % (ref 35–47)
HCT VFR BLD AUTO: 32.9 % (ref 35–47)
HGB BLD-MCNC: 10.5 G/DL (ref 11.5–16)
HGB BLD-MCNC: 9.9 G/DL (ref 11.5–16)
IMM GRANULOCYTES # BLD AUTO: 0.1 K/UL (ref 0–0.04)
IMM GRANULOCYTES # BLD AUTO: 0.1 K/UL (ref 0–0.04)
IMM GRANULOCYTES NFR BLD AUTO: 1 % (ref 0–0.5)
IMM GRANULOCYTES NFR BLD AUTO: 2 % (ref 0–0.5)
LYMPHOCYTES # BLD: 0.4 K/UL (ref 0.8–3.5)
LYMPHOCYTES # BLD: 0.5 K/UL (ref 0.8–3.5)
LYMPHOCYTES NFR BLD: 7 % (ref 12–49)
LYMPHOCYTES NFR BLD: 7 % (ref 12–49)
MCH RBC QN AUTO: 28.2 PG (ref 26–34)
MCH RBC QN AUTO: 28.4 PG (ref 26–34)
MCHC RBC AUTO-ENTMCNC: 31.9 G/DL (ref 30–36.5)
MCHC RBC AUTO-ENTMCNC: 32.1 G/DL (ref 30–36.5)
MCV RBC AUTO: 88.2 FL (ref 80–99)
MCV RBC AUTO: 88.5 FL (ref 80–99)
MONOCYTES # BLD: 0.6 K/UL (ref 0–1)
MONOCYTES # BLD: 0.7 K/UL (ref 0–1)
MONOCYTES NFR BLD: 11 % (ref 5–13)
MONOCYTES NFR BLD: 11 % (ref 5–13)
NEUTS SEG # BLD: 4.2 K/UL (ref 1.8–8)
NEUTS SEG # BLD: 5.4 K/UL (ref 1.8–8)
NEUTS SEG NFR BLD: 80 % (ref 32–75)
NEUTS SEG NFR BLD: 81 % (ref 32–75)
NRBC # BLD: 0 K/UL (ref 0–0.01)
NRBC # BLD: 0 K/UL (ref 0–0.01)
NRBC BLD-RTO: 0 PER 100 WBC
NRBC BLD-RTO: 0 PER 100 WBC
PLATELET # BLD AUTO: 245 K/UL (ref 150–400)
PLATELET # BLD AUTO: 253 K/UL (ref 150–400)
PMV BLD AUTO: 10 FL (ref 8.9–12.9)
PMV BLD AUTO: 9.5 FL (ref 8.9–12.9)
POTASSIUM SERPL-SCNC: 4.2 MMOL/L (ref 3.5–5.1)
PROT SERPL-MCNC: 6.2 G/DL (ref 6.4–8.2)
RBC # BLD AUTO: 3.48 M/UL (ref 3.8–5.2)
RBC # BLD AUTO: 3.73 M/UL (ref 3.8–5.2)
RBC MORPH BLD: ABNORMAL
RBC MORPH BLD: ABNORMAL
SODIUM SERPL-SCNC: 137 MMOL/L (ref 136–145)
SPECIMEN HOLD: NORMAL
TSH SERPL DL<=0.05 MIU/L-ACNC: 0.21 UIU/ML (ref 0.36–3.74)
WBC # BLD AUTO: 5.3 K/UL (ref 3.6–11)
WBC # BLD AUTO: 6.7 K/UL (ref 3.6–11)

## 2023-10-12 PROCEDURE — 93005 ELECTROCARDIOGRAM TRACING: CPT | Performed by: STUDENT IN AN ORGANIZED HEALTH CARE EDUCATION/TRAINING PROGRAM

## 2023-10-12 PROCEDURE — 2580000003 HC RX 258: Performed by: EMERGENCY MEDICINE

## 2023-10-12 PROCEDURE — 85025 COMPLETE CBC W/AUTO DIFF WBC: CPT

## 2023-10-12 PROCEDURE — 93010 ELECTROCARDIOGRAM REPORT: CPT | Performed by: INTERNAL MEDICINE

## 2023-10-12 PROCEDURE — 84443 ASSAY THYROID STIM HORMONE: CPT

## 2023-10-12 PROCEDURE — 84439 ASSAY OF FREE THYROXINE: CPT

## 2023-10-12 PROCEDURE — 36415 COLL VENOUS BLD VENIPUNCTURE: CPT

## 2023-10-12 PROCEDURE — 99284 EMERGENCY DEPT VISIT MOD MDM: CPT

## 2023-10-12 PROCEDURE — 80053 COMPREHEN METABOLIC PANEL: CPT

## 2023-10-12 PROCEDURE — 70551 MRI BRAIN STEM W/O DYE: CPT

## 2023-10-12 RX ADMIN — SODIUM CHLORIDE 1000 ML: 9 INJECTION, SOLUTION INTRAVENOUS at 00:35

## 2023-10-12 NOTE — ED TRIAGE NOTES
Patient arrived via EMS from [facility] for concern of weakness and leaning to the left. Patient was seen in the ED for dehydration, weakness leaning to left side yesterday. Patient had recent increase in seizure medication. BS was 167, temp 99.2.

## 2023-10-12 NOTE — ED TRIAGE NOTES
Patient arrives via EMS from nursing home. Patient has had multiple falls and was found on the floor in the bathroom. History of dementia, A&O x1. Denies any pain at this time. No blood thinner use.

## 2023-10-12 NOTE — ED NOTES
I have reviewed discharge instructions with the patient. Opportunity for questions and clarification was provided. The patient was unable to  verbalized an understanding of discharge instructions due to confusion from dementia . Patient discharged out of the ED via St[facility] due to hx of falls  and in stable condition.         Ruvalcaba Mallory [place]  10/12/23 8237

## 2023-10-12 NOTE — ED PROVIDER NOTES
[facility] EMERGENCY DEPT  EMERGENCY DEPARTMENT ENCOUNTER      Pt Name: Rhiannon Sawyer  MRN: [id number]  [address] [facility] 1945  Date of evaluation: 10/11/2023  Provider: Saundra Hyatt DO    CHIEF COMPLAINT       Chief Complaint   Patient presents with    Fall         HISTORY OF PRESENT ILLNESS    HPI    Rhiannon Sawyer is a 78 y.o. female with a history of diabetes, dementia, seizure disorder on Keppra who presents to the emergency department for evaluation of multiple falls. Patient lives at a nursing facility, baseline mental status alert and oriented x1. No blood thinner use. Patient denies any complaints. Subsequently spoke with her son at bedside who spoke with facility, patient has had several falls today therefore they are concerned given her increased falls. They do not report any other recent illness for the patient. Nursing Notes were reviewed.     REVIEW OF SYSTEMS       Review of Systems   Unable to perform ROS: Dementia           PAST MEDICAL HISTORY     Past Medical History:   Diagnosis Date    Depression     DM (diabetes mellitus) ([address])     Hypercholesterolemia     Hypothyroid     KIKA on CPAP     Dr. Yumi Sheridan     Urinary incontinence          SURGICAL HISTORY       Past Surgical History:   Procedure Laterality Date    COLONOSCOPY      GYN      HEENT      ORTHOPEDIC SURGERY      [place] UNLISTED PROCEDURE ABDOMEN PERITONEUM & OMENTUM      UPPER GASTROINTESTINAL ENDOSCOPY           CURRENT MEDICATIONS       Previous Medications    ACETAMINOPHEN (TYLENOL) 325 MG TABLET    Take 2 tablets by mouth every 4 hours as needed    ASPIRIN 81 MG CHEWABLE TABLET    Take 1 tablet by mouth daily    ATORVASTATIN (LIPITOR) 40 MG TABLET    Take 1 tablet by mouth nightly    BUPROPION (WELLBUTRIN SR) 150 MG EXTENDED RELEASE TABLET    Take 1 tablet by mouth daily    BUSPIRONE (BUSPAR) 10 MG TABLET    Take 1 tablet by mouth daily    CHOLINE FENOFIBRATE (TRILIPIX) 135 MG CPDR DELAYED RELEASE CAPSULE    Take 1

## 2023-10-12 NOTE — ED PROVIDER NOTES
[facility] EMERGENCY DEPT  EMERGENCY DEPARTMENT ENCOUNTER      Pt Name: Mikel Councilman  MRN: [id number]  [address] [facility] [place] 1945  Date of evaluation: 10/12/2023  Provider: Rod Richardson MD    [address]       Chief Complaint   Patient presents with    Fatigue     HISTORY OF PRESENT ILLNESS   (Location/Symptom, Timing/Onset, Context/Setting, Quality, Duration, Modifying Factors, Severity)  Note limiting factors. HPI  77 yo F with pmhx of dementia, TIA, seizures on keppra, DM, KIKA on CPAP, urinary incontinence, hypothyroid, hypercholesterolemia, depression, presents to the ER with EMS for evaluation of generalized fatigue/weakness, not acting like herself, increased falls, and ongoing leaning to the left. Patient was seen yesterday in the ER after several falls, and reportedly per EMS at that time had similar symptoms. Her work-up in the ER was largely unremarkable, with a negative CT head at that time. Normal labs, as well as noninfectious UA. However, due to ongoing symptoms, nursing home facility wanted patient to be reevaluated again today. Per EMS, patient also recently had seizure medications doubled in dose over the past week- unclear if this has had any effect on her increased lethargy. Patient herself is unable to provide any reliable history due to dementia, however, tells me she has no complaints and \"I feel better than normal.\"    Review of External Medical Records:     Nursing Notes were reviewed. REVIEW OF SYSTEMS    (2-9 systems for level 4, 10 or more for level 5)     Review of Systems   All other systems reviewed and are negative. Except as noted above the remainder of the review of systems was reviewed and negative.        PAST MEDICAL HISTORY     Past Medical History:   Diagnosis Date    Depression     DM (diabetes mellitus) ([address])     Hypercholesterolemia     Hypothyroid     KIKA on CPAP     Dr. Meena Gamboa     Urinary incontinence          SURGICAL HISTORY       Past Surgical History:

## 2023-10-12 NOTE — ED NOTES
MRI checklist completed at this time with pts son (kishan) via telephone.      Conrad Acosta RN  10/12/23 3963

## 2023-10-13 LAB
LEVETIRACETAM SERPL-MCNC: 26.2 UG/ML (ref 10–40)
T4 FREE SERPL-MCNC: 1.6 NG/DL (ref 0.8–1.5)

## 2023-10-13 NOTE — ED NOTES
Report called to Anthony Bagley at [facility].  Pt to be transported  back via [facility]. Eta. 2130     Hector Burdick RN  10/12/23 2101

## 2023-10-13 NOTE — DISCHARGE INSTRUCTIONS
Your work-up here is largely unremarkable today. An MRI of your brain was completed to rule out for stroke, which was negative. Your blood work was also reassuring, with the only slight abnormality being your TSH level, which was mildly low. This can be followed up by your regular outpatient physician, including primary care physician vs endocrinologist.  This is not likely to be contributing to your current presentation today. I suspect the increased lethargy and weakness is likely secondary to your recent increased dose of Keppra. This should get better with continued use. Please follow-up with neurology for ongoing management of your seizure disorder, and for any questions related to your medications.

## 2023-10-18 ENCOUNTER — HOSPITAL ENCOUNTER (EMERGENCY)
Facility: HOSPITAL | Age: 78
Discharge: HOME | End: 2023-10-18
Attending: EMERGENCY MEDICINE
Payer: MEDICARE

## 2023-10-18 ENCOUNTER — APPOINTMENT (OUTPATIENT)
Facility: HOSPITAL | Age: 78
End: 2023-10-18
Payer: MEDICARE

## 2023-10-18 VITALS
OXYGEN SATURATION: 94 % | TEMPERATURE: 97.3 F | BODY MASS INDEX: 24.8 KG/M2 | SYSTOLIC BLOOD PRESSURE: 132 MMHG | WEIGHT: 140 LBS | RESPIRATION RATE: 15 BRPM | HEIGHT: 63 IN | DIASTOLIC BLOOD PRESSURE: 67 MMHG | HEART RATE: 68 BPM

## 2023-10-18 DIAGNOSIS — N30.00 ACUTE CYSTITIS WITHOUT HEMATURIA: ICD-10-CM

## 2023-10-18 DIAGNOSIS — U07.1 COVID-19: ICD-10-CM

## 2023-10-18 DIAGNOSIS — G40.909 SEIZURE DISORDER (HCC): Primary | ICD-10-CM

## 2023-10-18 LAB
ALBUMIN SERPL-MCNC: 3 G/DL (ref 3.5–5)
ALBUMIN/GLOB SERPL: 0.9 (ref 1.1–2.2)
ALP SERPL-CCNC: 61 U/L (ref 45–117)
ALT SERPL-CCNC: 30 U/L (ref 12–78)
ANION GAP SERPL CALC-SCNC: 4 MMOL/L (ref 5–15)
APPEARANCE UR: ABNORMAL
AST SERPL-CCNC: 36 U/L (ref 15–37)
BACTERIA URNS QL MICRO: ABNORMAL /HPF
BASOPHILS # BLD: 0 K/UL (ref 0–0.1)
BASOPHILS NFR BLD: 0 % (ref 0–1)
BILIRUB SERPL-MCNC: 0.4 MG/DL (ref 0.2–1)
BILIRUB UR QL: NEGATIVE
BUN SERPL-MCNC: 16 MG/DL (ref 6–20)
BUN/CREAT SERPL: 19 (ref 12–20)
CALCIUM SERPL-MCNC: 8.7 MG/DL (ref 8.5–10.1)
CHLORIDE SERPL-SCNC: 108 MMOL/L (ref 97–108)
CO2 SERPL-SCNC: 27 MMOL/L (ref 21–32)
COLOR UR: ABNORMAL
COMMENT:: NORMAL
CREAT SERPL-MCNC: 0.84 MG/DL (ref 0.55–1.02)
DIFFERENTIAL METHOD BLD: ABNORMAL
EOSINOPHIL # BLD: 0 K/UL (ref 0–0.4)
EOSINOPHIL NFR BLD: 0 % (ref 0–7)
EPITH CASTS URNS QL MICRO: ABNORMAL /LPF
ERYTHROCYTE [DISTWIDTH] IN BLOOD BY AUTOMATED COUNT: 14.6 % (ref 11.5–14.5)
GLOBULIN SER CALC-MCNC: 3.2 G/DL (ref 2–4)
GLUCOSE BLD STRIP.AUTO-MCNC: 122 MG/DL (ref 65–117)
GLUCOSE SERPL-MCNC: 135 MG/DL (ref 65–100)
GLUCOSE UR STRIP.AUTO-MCNC: NEGATIVE MG/DL
HCT VFR BLD AUTO: 35.1 % (ref 35–47)
HGB BLD-MCNC: 11.1 G/DL (ref 11.5–16)
HGB UR QL STRIP: NEGATIVE
IMM GRANULOCYTES # BLD AUTO: 0.1 K/UL (ref 0–0.04)
IMM GRANULOCYTES NFR BLD AUTO: 1 % (ref 0–0.5)
KETONES UR QL STRIP.AUTO: NEGATIVE MG/DL
LEUKOCYTE ESTERASE UR QL STRIP.AUTO: ABNORMAL
LYMPHOCYTES # BLD: 0.8 K/UL (ref 0.8–3.5)
LYMPHOCYTES NFR BLD: 13 % (ref 12–49)
MCH RBC QN AUTO: 28 PG (ref 26–34)
MCHC RBC AUTO-ENTMCNC: 31.6 G/DL (ref 30–36.5)
MCV RBC AUTO: 88.6 FL (ref 80–99)
MONOCYTES # BLD: 0.5 K/UL (ref 0–1)
MONOCYTES NFR BLD: 8 % (ref 5–13)
NEUTS SEG # BLD: 4.4 K/UL (ref 1.8–8)
NEUTS SEG NFR BLD: 78 % (ref 32–75)
NITRITE UR QL STRIP.AUTO: NEGATIVE
NRBC # BLD: 0 K/UL (ref 0–0.01)
NRBC BLD-RTO: 0 PER 100 WBC
PH UR STRIP: 5.5 (ref 5–8)
PLATELET # BLD AUTO: 255 K/UL (ref 150–400)
PMV BLD AUTO: 10.4 FL (ref 8.9–12.9)
POTASSIUM SERPL-SCNC: 3.8 MMOL/L (ref 3.5–5.1)
PROT SERPL-MCNC: 6.2 G/DL (ref 6.4–8.2)
PROT UR STRIP-MCNC: NEGATIVE MG/DL
RBC # BLD AUTO: 3.96 M/UL (ref 3.8–5.2)
RBC #/AREA URNS HPF: ABNORMAL /HPF (ref 0–5)
RBC MORPH BLD: ABNORMAL
SERVICE CMNT-IMP: ABNORMAL
SODIUM SERPL-SCNC: 139 MMOL/L (ref 136–145)
SP GR UR REFRACTOMETRY: 1.02 (ref 1–1.03)
SPECIMEN HOLD: NORMAL
URINE CULTURE IF INDICATED: ABNORMAL
UROBILINOGEN UR QL STRIP.AUTO: 2 EU/DL (ref 0.2–1)
WBC # BLD AUTO: 5.8 K/UL (ref 3.6–11)
WBC URNS QL MICRO: >100 /HPF (ref 0–4)

## 2023-10-18 PROCEDURE — 6360000002 HC RX W HCPCS: Performed by: EMERGENCY MEDICINE

## 2023-10-18 PROCEDURE — 99285 EMERGENCY DEPT VISIT HI MDM: CPT

## 2023-10-18 PROCEDURE — 96374 THER/PROPH/DIAG INJ IV PUSH: CPT

## 2023-10-18 PROCEDURE — 80053 COMPREHEN METABOLIC PANEL: CPT

## 2023-10-18 PROCEDURE — 71046 X-RAY EXAM CHEST 2 VIEWS: CPT

## 2023-10-18 PROCEDURE — 82962 GLUCOSE BLOOD TEST: CPT

## 2023-10-18 PROCEDURE — 6370000000 HC RX 637 (ALT 250 FOR IP): Performed by: EMERGENCY MEDICINE

## 2023-10-18 PROCEDURE — 80177 DRUG SCRN QUAN LEVETIRACETAM: CPT

## 2023-10-18 PROCEDURE — 87077 CULTURE AEROBIC IDENTIFY: CPT

## 2023-10-18 PROCEDURE — 87186 SC STD MICRODIL/AGAR DIL: CPT

## 2023-10-18 PROCEDURE — 81001 URINALYSIS AUTO W/SCOPE: CPT

## 2023-10-18 PROCEDURE — 85025 COMPLETE CBC W/AUTO DIFF WBC: CPT

## 2023-10-18 PROCEDURE — 87086 URINE CULTURE/COLONY COUNT: CPT

## 2023-10-18 PROCEDURE — 36415 COLL VENOUS BLD VENIPUNCTURE: CPT

## 2023-10-18 PROCEDURE — 71250 CT THORAX DX C-: CPT

## 2023-10-18 RX ORDER — NITROFURANTOIN 25; 75 MG/1; MG/1
100 CAPSULE ORAL
Status: COMPLETED | OUTPATIENT
Start: 2023-10-18 | End: 2023-10-18

## 2023-10-18 RX ORDER — NITROFURANTOIN 25; 75 MG/1; MG/1
100 CAPSULE ORAL 2 TIMES DAILY
Qty: 20 CAPSULE | Refills: 0 | Status: SHIPPED
Start: 2023-10-18 | End: 2023-10-20 | Stop reason: ALTCHOICE

## 2023-10-18 RX ORDER — LEVETIRACETAM 500 MG/5ML
1000 INJECTION, SOLUTION, CONCENTRATE INTRAVENOUS
Status: COMPLETED | OUTPATIENT
Start: 2023-10-18 | End: 2023-10-18

## 2023-10-18 RX ADMIN — NITROFURANTOIN MONOHYDRATE/MACROCRYSTALLINE 100 MG: 25; 75 CAPSULE ORAL at 20:35

## 2023-10-18 RX ADMIN — LEVETIRACETAM 1000 MG: 100 INJECTION, SOLUTION INTRAVENOUS at 15:44

## 2023-10-18 ASSESSMENT — PAIN - FUNCTIONAL ASSESSMENT: PAIN_FUNCTIONAL_ASSESSMENT: 0-10

## 2023-10-18 ASSESSMENT — PAIN SCALES - GENERAL: PAINLEVEL_OUTOF10: 0

## 2023-10-18 NOTE — ED PROVIDER NOTES
[facility] EMERGENCY DEPT  EMERGENCY DEPARTMENT ENCOUNTER      Pt Name: Maame Montano  MRN: [id number]  [address] [facility] 1945  Date of evaluation: 10/18/2023  Provider: Rio Us MD    CHIEF COMPLAINT       Chief Complaint   Patient presents with    Seizures         HISTORY OF PRESENT ILLNESS   (Location/Symptom, Timing/Onset, Context/Setting, Quality, Duration, Modifying Factors, Severity)  Note limiting factors. Maame Montano is a 78 y.o. female who presents to the emergency department      The history is provided by the EMS personnel. The history is limited by the condition of the patient. No  was used. Seizures  Seizure activity on arrival: no    Return to baseline: yes    Timing:  Once      Nursing Notes were reviewed. REVIEW OF SYSTEMS    (2-9 systems for level 4, 10 or more for level 5)     Review of Systems   Unable to perform ROS: Dementia   Neurological:  Positive for seizures. Except as noted above the remainder of the review of systems was reviewed and negative.        PAST MEDICAL HISTORY     Past Medical History:   Diagnosis Date    Depression     DM (diabetes mellitus) ([address]     Hypercholesterolemia     Hypothyroid     KIKA on CPAP     Dr. Dodie Kayser     Urinary incontinence          SURGICAL HISTORY       Past Surgical History:   Procedure Laterality Date    COLONOSCOPY      GYN      HEENT      ORTHOPEDIC SURGERY      [place] UNLISTED PROCEDURE ABDOMEN PERITONEUM & OMENTUM      UPPER GASTROINTESTINAL ENDOSCOPY           CURRENT MEDICATIONS       Previous Medications    ACETAMINOPHEN (TYLENOL) 325 MG TABLET    Take 2 tablets by mouth every 4 hours as needed    ASPIRIN 81 MG CHEWABLE TABLET    Take 1 tablet by mouth daily    ATORVASTATIN (LIPITOR) 40 MG TABLET    Take 1 tablet by mouth nightly    BUPROPION (WELLBUTRIN SR) 150 MG EXTENDED RELEASE TABLET    Take 1 tablet by mouth daily    BUSPIRONE (BUSPAR) 10 MG TABLET    Take 1 tablet by mouth daily    CHOLINE FENOFIBRATE

## 2023-10-18 NOTE — ED TRIAGE NOTES
Pt arrives to ED via EMS from [facility] where staff reports pt had witnessed seizure, unsure how long it lasted but found pt postictal.  Per EMS pt is altered at baseline. Pt has hx of seizures and was seen here recently for this issue. EMS unsure if pt takes medications regularly. Per EMS staff did not give a full report on pt hx. Pt COVID positive.

## 2023-10-19 LAB — LEVETIRACETAM SERPL-MCNC: 23.7 UG/ML (ref 10–40)

## 2023-10-19 NOTE — ED NOTES
[facility] at bedside      Mary Licona, [address]  10/18/23 [address] [place] [address]

## 2023-10-19 NOTE — ED NOTES
Discharge instructions included in paperwork. Pt unable to sign due to shakiness.       Kelly Young RN  10/18/23 3321

## 2023-10-19 NOTE — ED NOTES
Report called to [facility], [place], [address]  10/18/23 4273

## 2023-10-20 ENCOUNTER — APPOINTMENT (OUTPATIENT)
Facility: HOSPITAL | Age: 78
End: 2023-10-20
Payer: MEDICARE

## 2023-10-20 ENCOUNTER — HOSPITAL ENCOUNTER (EMERGENCY)
Facility: HOSPITAL | Age: 78
Discharge: HOME | End: 2023-10-20
Attending: EMERGENCY MEDICINE
Payer: MEDICARE

## 2023-10-20 VITALS
BODY MASS INDEX: 24.8 KG/M2 | RESPIRATION RATE: 17 BRPM | WEIGHT: 140 LBS | SYSTOLIC BLOOD PRESSURE: 123 MMHG | HEIGHT: 63 IN | TEMPERATURE: 97.1 F | DIASTOLIC BLOOD PRESSURE: 64 MMHG | HEART RATE: 86 BPM | OXYGEN SATURATION: 97 %

## 2023-10-20 DIAGNOSIS — N39.0 ACUTE UTI: ICD-10-CM

## 2023-10-20 DIAGNOSIS — R53.1 GENERAL WEAKNESS: Primary | ICD-10-CM

## 2023-10-20 LAB
ALBUMIN SERPL-MCNC: 3.1 G/DL (ref 3.5–5)
ALBUMIN/GLOB SERPL: 0.9 (ref 1.1–2.2)
ALP SERPL-CCNC: 66 U/L (ref 45–117)
ALT SERPL-CCNC: 27 U/L (ref 12–78)
AMMONIA PLAS-SCNC: 38 UMOL/L
ANION GAP SERPL CALC-SCNC: 7 MMOL/L (ref 5–15)
APPEARANCE UR: CLEAR
AST SERPL-CCNC: 34 U/L (ref 15–37)
BACTERIA URNS QL MICRO: NEGATIVE /HPF
BASOPHILS # BLD: 0 K/UL (ref 0–0.1)
BASOPHILS NFR BLD: 0 % (ref 0–1)
BILIRUB SERPL-MCNC: 0.4 MG/DL (ref 0.2–1)
BILIRUB UR QL CFM: NEGATIVE
BUN SERPL-MCNC: 16 MG/DL (ref 6–20)
BUN/CREAT SERPL: 16 (ref 12–20)
CALCIUM SERPL-MCNC: 8.8 MG/DL (ref 8.5–10.1)
CHLORIDE SERPL-SCNC: 110 MMOL/L (ref 97–108)
CO2 SERPL-SCNC: 25 MMOL/L (ref 21–32)
COLOR UR: YELLOW
COMMENT:: NORMAL
COMMENT:: NORMAL
CREAT SERPL-MCNC: 0.97 MG/DL (ref 0.55–1.02)
DIFFERENTIAL METHOD BLD: ABNORMAL
EOSINOPHIL # BLD: 0.1 K/UL (ref 0–0.4)
EOSINOPHIL NFR BLD: 1 % (ref 0–7)
EPITH CASTS URNS QL MICRO: ABNORMAL /LPF
ERYTHROCYTE [DISTWIDTH] IN BLOOD BY AUTOMATED COUNT: 14.8 % (ref 11.5–14.5)
GLOBULIN SER CALC-MCNC: 3.4 G/DL (ref 2–4)
GLUCOSE SERPL-MCNC: 139 MG/DL (ref 65–100)
GLUCOSE UR STRIP.AUTO-MCNC: NEGATIVE MG/DL
HCT VFR BLD AUTO: 35.7 % (ref 35–47)
HGB BLD-MCNC: 11.3 G/DL (ref 11.5–16)
HGB UR QL STRIP: NEGATIVE
HYALINE CASTS URNS QL MICRO: ABNORMAL /LPF (ref 0–2)
IMM GRANULOCYTES # BLD AUTO: 0.1 K/UL (ref 0–0.04)
IMM GRANULOCYTES NFR BLD AUTO: 1 % (ref 0–0.5)
KETONES UR QL STRIP.AUTO: NEGATIVE MG/DL
LACTATE SERPL-SCNC: 1.8 MMOL/L (ref 0.4–2)
LEUKOCYTE ESTERASE UR QL STRIP.AUTO: ABNORMAL
LYMPHOCYTES # BLD: 0.7 K/UL (ref 0.8–3.5)
LYMPHOCYTES NFR BLD: 12 % (ref 12–49)
MAGNESIUM SERPL-MCNC: 1.9 MG/DL (ref 1.6–2.4)
MCH RBC QN AUTO: 27.8 PG (ref 26–34)
MCHC RBC AUTO-ENTMCNC: 31.7 G/DL (ref 30–36.5)
MCV RBC AUTO: 87.9 FL (ref 80–99)
MONOCYTES # BLD: 0.4 K/UL (ref 0–1)
MONOCYTES NFR BLD: 7 % (ref 5–13)
NEUTS SEG # BLD: 4.4 K/UL (ref 1.8–8)
NEUTS SEG NFR BLD: 79 % (ref 32–75)
NITRITE UR QL STRIP.AUTO: NEGATIVE
NRBC # BLD: 0 K/UL (ref 0–0.01)
NRBC BLD-RTO: 0 PER 100 WBC
PH UR STRIP: 6.5 (ref 5–8)
PLATELET # BLD AUTO: 301 K/UL (ref 150–400)
PMV BLD AUTO: 10.1 FL (ref 8.9–12.9)
POTASSIUM SERPL-SCNC: 3.4 MMOL/L (ref 3.5–5.1)
PROT SERPL-MCNC: 6.5 G/DL (ref 6.4–8.2)
PROT UR STRIP-MCNC: NEGATIVE MG/DL
RBC # BLD AUTO: 4.06 M/UL (ref 3.8–5.2)
RBC #/AREA URNS HPF: ABNORMAL /HPF (ref 0–5)
RBC MORPH BLD: ABNORMAL
SODIUM SERPL-SCNC: 142 MMOL/L (ref 136–145)
SP GR UR REFRACTOMETRY: 1.02 (ref 1–1.03)
SPECIMEN HOLD: NORMAL
SPECIMEN HOLD: NORMAL
UROBILINOGEN UR QL STRIP.AUTO: 2 EU/DL (ref 0.2–1)
WBC # BLD AUTO: 5.7 K/UL (ref 3.6–11)
WBC URNS QL MICRO: ABNORMAL /HPF (ref 0–4)

## 2023-10-20 PROCEDURE — 99284 EMERGENCY DEPT VISIT MOD MDM: CPT

## 2023-10-20 PROCEDURE — 82140 ASSAY OF AMMONIA: CPT

## 2023-10-20 PROCEDURE — 83735 ASSAY OF MAGNESIUM: CPT

## 2023-10-20 PROCEDURE — 85025 COMPLETE CBC W/AUTO DIFF WBC: CPT

## 2023-10-20 PROCEDURE — 70450 CT HEAD/BRAIN W/O DYE: CPT

## 2023-10-20 PROCEDURE — 80053 COMPREHEN METABOLIC PANEL: CPT

## 2023-10-20 PROCEDURE — 81001 URINALYSIS AUTO W/SCOPE: CPT

## 2023-10-20 PROCEDURE — 36415 COLL VENOUS BLD VENIPUNCTURE: CPT

## 2023-10-20 PROCEDURE — 2580000003 HC RX 258: Performed by: EMERGENCY MEDICINE

## 2023-10-20 PROCEDURE — 96374 THER/PROPH/DIAG INJ IV PUSH: CPT

## 2023-10-20 PROCEDURE — 83605 ASSAY OF LACTIC ACID: CPT

## 2023-10-20 PROCEDURE — 6360000002 HC RX W HCPCS: Performed by: EMERGENCY MEDICINE

## 2023-10-20 RX ORDER — CEPHALEXIN 500 MG/1
500 CAPSULE ORAL 2 TIMES DAILY
Qty: 14 CAPSULE | Refills: 0 | Status: SHIPPED | OUTPATIENT
Start: 2023-10-20 | End: 2023-10-27

## 2023-10-20 RX ADMIN — WATER 1000 MG: 1 INJECTION INTRAMUSCULAR; INTRAVENOUS; SUBCUTANEOUS at 17:50

## 2023-10-20 ASSESSMENT — PAIN - FUNCTIONAL ASSESSMENT: PAIN_FUNCTIONAL_ASSESSMENT: NONE - DENIES PAIN

## 2023-10-20 NOTE — ED TRIAGE NOTES
Patient is an 78year old female coming from memory care via EMS. Per EMS, patient started having increased fatigue and \"not acting right\" per the memory care center staff. Patient in NAD, alert but non responsive which is baseline dementia. Patient seen for this numerous times previously x1 week with negative work up results.

## 2023-10-20 NOTE — ED PROVIDER NOTES
distress. Appearance: She is not toxic-appearing. HENT:      Head: Normocephalic. Eyes:      Pupils: Pupils are equal, round, and reactive to light. Cardiovascular:      Rate and Rhythm: Normal rate and regular rhythm. Pulmonary:      Effort: Pulmonary effort is normal.      Breath sounds: Normal breath sounds. Abdominal:      General: Abdomen is flat. Palpations: Abdomen is soft. Skin:     General: Skin is warm and dry. Neurological:      Mental Status: She is alert. Mental status is at baseline. Motor: No abnormal muscle tone or seizure activity. Comments: Awake, interacts with me, follows basic commands.   Tells me her name and will say yes/no to questions - sometimes has to be repeated but will answer         DIAGNOSTIC RESULTS     EKG: All EKG's are interpreted by the Emergency Department Physician who either signs or Co-signs this chart in the absence of a cardiologist.        RADIOLOGY:   Non-plain film images such as CT, Ultrasound and MRI are read by the radiologist. Hermina Brooke radiographic images are visualized and preliminarily interpreted by the emergency physician with the below findings:        Interpretation per the Radiologist below, if available at the time of this note:    CT Head W/O Contrast   Final Result   No acute intracranial process identified                 LABS:  Labs Reviewed   COMPREHENSIVE METABOLIC PANEL - Abnormal; Notable for the following components:       Result Value    Potassium 3.4 (*)     Chloride 110 (*)     Glucose 139 (*)     Est, Glom Filt Rate 59 (*)     Albumin 3.1 (*)     Albumin/Globulin Ratio 0.9 (*)     All other components within normal limits   CBC WITH AUTO DIFFERENTIAL - Abnormal; Notable for the following components:    Hemoglobin 11.3 (*)     RDW 14.8 (*)     Neutrophils % 79 (*)     Immature Granulocytes 1 (*)     Lymphocytes Absolute 0.7 (*)     Absolute Immature Granulocyte 0.1 (*)     All other components within normal limits ordered. Radiology: ordered. Risk  Prescription drug management. REASSESSMENT            CONSULTS:  None    PROCEDURES:  Unless otherwise noted below, none     Procedures      FINAL IMPRESSION      1. General weakness    2.  Acute UTI          DISPOSITION/PLAN   DISPOSITION Decision To Discharge 10/20/2023 05:26:37 PM      PATIENT REFERRED TO:  FOLLOW UP WITH YOUR PCP TO REEVALUATE YOUR SYMPTOMS            DISCHARGE MEDICATIONS:  Discharge Medication List as of 10/20/2023  5:20 PM        START taking these medications    Details   cephALEXin (KEFLEX) 500 MG capsule Take 1 capsule by mouth 2 times daily for 7 days, Disp-14 capsule, R-0Normal               (Please note that portions of this note were completed with a voice recognition program.  Efforts were made to edit the dictations but occasionally words are mis-transcribed.)    Kathi Dillard MD (electronically signed)  Emergency Medicine Attending Physician      \     Kathi Dillard MD  10/22/23 2120

## 2023-10-20 NOTE — DISCHARGE INSTRUCTIONS
Your CAT scan and labs are reassuring today. You were put on a antibiotic for urinary tract infection a few days ago. Your urine today looks to be improving. However given the reports of your symptoms, I have given you a dose of IV antibiotics and I am changing your antibiotic for your urinary tract infection.

## 2023-10-20 NOTE — ED NOTES
Patient care transferred to Akanksha Rodriguez, [address] [place], [place]  10/20/23 6055

## 2023-10-20 NOTE — ED NOTES
The patient was signed out to me by Dr. Author Hernandez for review of CT and lab results and disposition after review. Bedside signout was performed and the patient was evaluated by me. She is resting bed comfortable no apparent distress and denies any discomfort. I will continue to monitor and reevaluate pending lab results. Progress Note:   Pt has been reexamined by Aleksander Hyatt MD. Pt is feeling much better. Symptoms have improved. All available results have been reviewed with pt and any available family. Pt understands sx, dx, and tx in ED. Care plan has been outlined and questions have been answered. Pt is ready to go home. Will send home on follow gait instability and head injury and dehydration instruction. Resume medication as previously instructed. . Outpatient referral with PCP as needed.  Written by Aleksander Hyatt MD,4:54 AM      [place] MD  10/20/23 9289

## 2023-10-20 NOTE — ED NOTES
Attempted to call report to [facility] - [address] [place] [address] -  KARSON ELISE, told that nurse would call back.      Vilma Reynolds RN  10/20/23 8966

## 2023-10-20 NOTE — ED NOTES
TRANSFER - OUT REPORT:  Verbal report given to Zahra Stone on Luan Eugene  being transferred to [address] [place] [address] for routine progression of patient care     Report consisted of patient's Situation, Background, Assessment and Recommendations(SBAR). Information from the following report(s) ED Encounter Summary was reviewed with the receiving nurse. Opportunity for questions and clarification was provided.       Patient transported with:  EMS       June Vera RN  10/20/23 4035

## 2023-10-21 LAB
BACTERIA SPEC CULT: ABNORMAL
CC UR VC: ABNORMAL
SERVICE CMNT-IMP: ABNORMAL

## 2023-11-08 ENCOUNTER — APPOINTMENT (OUTPATIENT)
Facility: HOSPITAL | Age: 78
End: 2023-11-08
Payer: MEDICARE

## 2023-11-08 ENCOUNTER — HOSPITAL ENCOUNTER (EMERGENCY)
Facility: HOSPITAL | Age: 78
Discharge: HOME | End: 2023-11-08
Attending: EMERGENCY MEDICINE
Payer: MEDICARE

## 2023-11-08 VITALS
TEMPERATURE: 97.8 F | DIASTOLIC BLOOD PRESSURE: 67 MMHG | HEIGHT: 63 IN | WEIGHT: 138.89 LBS | RESPIRATION RATE: 18 BRPM | OXYGEN SATURATION: 100 % | BODY MASS INDEX: 24.61 KG/M2 | HEART RATE: 70 BPM | SYSTOLIC BLOOD PRESSURE: 135 MMHG
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
## 2023-11-08 DIAGNOSIS — S09.90XA CLOSED HEAD INJURY, INITIAL ENCOUNTER: ICD-10-CM

## 2023-11-08 DIAGNOSIS — S52.514A CLOSED NONDISPLACED FRACTURE OF STYLOID PROCESS OF RIGHT RADIUS, INITIAL ENCOUNTER: Primary | ICD-10-CM

## 2023-11-08 DIAGNOSIS — S00.03XA HEMATOMA OF SCALP, INITIAL ENCOUNTER: ICD-10-CM

## 2023-11-08 PROCEDURE — 70450 CT HEAD/BRAIN W/O DYE: CPT

## 2023-11-08 PROCEDURE — 99284 EMERGENCY DEPT VISIT MOD MDM: CPT

## 2023-11-08 PROCEDURE — 73110 X-RAY EXAM OF WRIST: CPT

## 2023-11-08 PROCEDURE — 72125 CT NECK SPINE W/O DYE: CPT

## 2023-11-08 ASSESSMENT — LIFESTYLE VARIABLES
HOW MANY STANDARD DRINKS CONTAINING ALCOHOL DO YOU HAVE ON A TYPICAL DAY: PATIENT DOES NOT DRINK
HOW OFTEN DO YOU HAVE A DRINK CONTAINING ALCOHOL: NEVER

## 2023-11-08 ASSESSMENT — PAIN SCALES - GENERAL: PAINLEVEL_OUTOF10: 0

## 2023-11-08 NOTE — DISCHARGE INSTRUCTIONS
Routine appointments for health maintenance with a primary care provider are very important and emergency department visits are no substitute. You should review all findings and test results from your visit today with your primary care physician. We recommended that you take medications as prescribed. You may take 1 or 2 pills of Tylenol every 6 hours as needed for pain or fever. You should not take this medication with other medications that contain acetaminophen/Tylenol which could include prescription pain medications or other combo over-the-counter medications. You should not exceed more than 4000 mg in a 24 hour. You may use 800 mg of ibuprofen every 6 hours as needed for pain or fever. You should NOT take this medication if you're taking other NSAID/anti-inflammatory medications or on steroids or other blood thinning medications. Please try to rest, use ice, compression and elevation to help with symptoms. Use ice every 2 hours for 20 minutes to help alleviate inflammation and pain. Return to the emergency department for any new or concerning signs/symptoms or failure to improve such as discoloration of the hand, large amount of swelling to the hand.

## 2023-11-08 NOTE — ED TRIAGE NOTES
Pt presents to the ER today via EMS. Pt is from [facility]. They state pt has bruising to upper right forehead and right hand. Secondary to a fall of unknown time. Pt is baseline and denying any pain.

## 2023-11-08 NOTE — ED NOTES
Pt was discharged by Dr Deborah Balbuena  Pt verbalized good understanding of all discharge instructions, and follow up care. All questions answered. Pt in stable condition on discharge.       Erlin Youssef RN  11/08/23 3329

## 2024-04-29 ENCOUNTER — APPOINTMENT (OUTPATIENT)
Facility: HOSPITAL | Age: 79
End: 2024-04-29
Payer: MEDICARE

## 2024-04-29 ENCOUNTER — HOSPITAL ENCOUNTER (EMERGENCY)
Facility: HOSPITAL | Age: 79
Discharge: HOME | End: 2024-04-29
Attending: EMERGENCY MEDICINE
Payer: MEDICARE

## 2024-04-29 VITALS
SYSTOLIC BLOOD PRESSURE: 121 MMHG | OXYGEN SATURATION: 98 % | RESPIRATION RATE: 14 BRPM | HEIGHT: 63 IN | TEMPERATURE: 98.6 F | DIASTOLIC BLOOD PRESSURE: 60 MMHG | HEART RATE: 77 BPM | WEIGHT: 123.46 LBS | BODY MASS INDEX: 21.88 KG/M2

## 2024-04-29 DIAGNOSIS — M50.30 DDD (DEGENERATIVE DISC DISEASE), CERVICAL: ICD-10-CM
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
## 2024-04-29 LAB
APPEARANCE UR: CLEAR
BACTERIA URNS QL MICRO: NEGATIVE /HPF
BILIRUB UR QL: NEGATIVE
COLOR UR: ABNORMAL
EPITH CASTS URNS QL MICRO: ABNORMAL /LPF
GLUCOSE UR STRIP.AUTO-MCNC: NEGATIVE MG/DL
HGB UR QL STRIP: NEGATIVE
KETONES UR QL STRIP.AUTO: ABNORMAL MG/DL
LEUKOCYTE ESTERASE UR QL STRIP.AUTO: NEGATIVE
NITRITE UR QL STRIP.AUTO: NEGATIVE
PH UR STRIP: 5.5 (ref 5–8)
PROT UR STRIP-MCNC: NEGATIVE MG/DL
RBC #/AREA URNS HPF: ABNORMAL /HPF (ref 0–5)
SP GR UR REFRACTOMETRY: 1.03 (ref 1–1.03)
URINE CULTURE IF INDICATED: ABNORMAL
UROBILINOGEN UR QL STRIP.AUTO: 1 EU/DL (ref 0.2–1)
WBC URNS QL MICRO: ABNORMAL /HPF (ref 0–4)

## 2024-04-29 PROCEDURE — 81001 URINALYSIS AUTO W/SCOPE: CPT

## 2024-04-29 PROCEDURE — 51701 INSERT BLADDER CATHETER: CPT

## 2024-04-29 PROCEDURE — 72170 X-RAY EXAM OF PELVIS: CPT

## 2024-04-29 PROCEDURE — 70450 CT HEAD/BRAIN W/O DYE: CPT

## 2024-04-29 PROCEDURE — 99285 EMERGENCY DEPT VISIT HI MDM: CPT

## 2024-04-29 PROCEDURE — 72125 CT NECK SPINE W/O DYE: CPT

## 2024-04-29 PROCEDURE — 93005 ELECTROCARDIOGRAM TRACING: CPT | Performed by: EMERGENCY MEDICINE

## 2024-04-29 ASSESSMENT — PAIN - FUNCTIONAL ASSESSMENT: PAIN_FUNCTIONAL_ASSESSMENT: 0-10

## 2024-04-29 ASSESSMENT — PAIN SCALES - GENERAL: PAINLEVEL_OUTOF10: 0

## 2024-04-30 ENCOUNTER — HOSPITAL ENCOUNTER (EMERGENCY)
Facility: HOSPITAL | Age: 79
Discharge: HOME | End: 2024-04-30
Attending: EMERGENCY MEDICINE
Payer: MEDICARE

## 2024-04-30 ENCOUNTER — APPOINTMENT (OUTPATIENT)
Facility: HOSPITAL | Age: 79
End: 2024-04-30
Payer: MEDICARE

## 2024-04-30 VITALS
SYSTOLIC BLOOD PRESSURE: 150 MMHG | OXYGEN SATURATION: 99 % | TEMPERATURE: 98 F | RESPIRATION RATE: 18 BRPM | HEART RATE: 65 BPM | DIASTOLIC BLOOD PRESSURE: 66 MMHG

## 2024-04-30 DIAGNOSIS — S09.90XA CLOSED HEAD INJURY, INITIAL ENCOUNTER: Primary | ICD-10-CM

## 2024-04-30 LAB
ANION GAP SERPL CALC-SCNC: 6 MMOL/L (ref 5–15)
BASOPHILS # BLD: 0 K/UL (ref 0–0.1)
BASOPHILS NFR BLD: 1 % (ref 0–1)
BUN SERPL-MCNC: 17 MG/DL (ref 6–20)
BUN/CREAT SERPL: 19 (ref 12–20)
CALCIUM SERPL-MCNC: 9.3 MG/DL (ref 8.5–10.1)
CHLORIDE SERPL-SCNC: 106 MMOL/L (ref 97–108)
CO2 SERPL-SCNC: 27 MMOL/L (ref 21–32)
CREAT SERPL-MCNC: 0.91 MG/DL (ref 0.55–1.02)
DIFFERENTIAL METHOD BLD: ABNORMAL
EKG ATRIAL RATE: 75 BPM
EKG DIAGNOSIS: NORMAL
EKG P AXIS: 118 DEGREES
EKG P-R INTERVAL: 146 MS
EKG Q-T INTERVAL: 366 MS
EKG QRS DURATION: 80 MS
EKG QTC CALCULATION (BAZETT): 408 MS
EKG R AXIS: -32 DEGREES
EKG T AXIS: 146 DEGREES
EKG VENTRICULAR RATE: 75 BPM
EOSINOPHIL # BLD: 0.1 K/UL (ref 0–0.4)
EOSINOPHIL NFR BLD: 1 % (ref 0–7)
ERYTHROCYTE [DISTWIDTH] IN BLOOD BY AUTOMATED COUNT: 15.2 % (ref 11.5–14.5)
GLUCOSE SERPL-MCNC: 143 MG/DL (ref 65–100)
HCT VFR BLD AUTO: 35.1 % (ref 35–47)
HGB BLD-MCNC: 11.5 G/DL (ref 11.5–16)
IMM GRANULOCYTES # BLD AUTO: 0 K/UL (ref 0–0.04)
IMM GRANULOCYTES NFR BLD AUTO: 0 % (ref 0–0.5)
LYMPHOCYTES # BLD: 0.8 K/UL (ref 0.8–3.5)
LYMPHOCYTES NFR BLD: 16 % (ref 12–49)
MCH RBC QN AUTO: 28.3 PG (ref 26–34)
MCHC RBC AUTO-ENTMCNC: 32.8 G/DL (ref 30–36.5)
MCV RBC AUTO: 86.5 FL (ref 80–99)
MONOCYTES # BLD: 0.5 K/UL (ref 0–1)
MONOCYTES NFR BLD: 9 % (ref 5–13)
NEUTS SEG # BLD: 3.9 K/UL (ref 1.8–8)
NEUTS SEG NFR BLD: 73 % (ref 32–75)
NRBC # BLD: 0 K/UL (ref 0–0.01)
NRBC BLD-RTO: 0 PER 100 WBC
PLATELET # BLD AUTO: 211 K/UL (ref 150–400)
PMV BLD AUTO: 9.4 FL (ref 8.9–12.9)
POTASSIUM SERPL-SCNC: 3.5 MMOL/L (ref 3.5–5.1)
RBC # BLD AUTO: 4.06 M/UL (ref 3.8–5.2)
SODIUM SERPL-SCNC: 139 MMOL/L (ref 136–145)
WBC # BLD AUTO: 5.3 K/UL (ref 3.6–11)

## 2024-04-30 PROCEDURE — 36415 COLL VENOUS BLD VENIPUNCTURE: CPT

## 2024-04-30 PROCEDURE — 70450 CT HEAD/BRAIN W/O DYE: CPT

## 2024-04-30 PROCEDURE — 80048 BASIC METABOLIC PNL TOTAL CA: CPT

## 2024-04-30 PROCEDURE — 93010 ELECTROCARDIOGRAM REPORT: CPT | Performed by: INTERNAL MEDICINE

## 2024-04-30 PROCEDURE — 94761 N-INVAS EAR/PLS OXIMETRY MLT: CPT

## 2024-04-30 PROCEDURE — 99284 EMERGENCY DEPT VISIT MOD MDM: CPT

## 2024-04-30 PROCEDURE — 85025 COMPLETE CBC W/AUTO DIFF WBC: CPT

## 2024-04-30 ASSESSMENT — PAIN - FUNCTIONAL ASSESSMENT: PAIN_FUNCTIONAL_ASSESSMENT: NONE - DENIES PAIN

## 2024-04-30 NOTE — ED TRIAGE NOTES
Staff of home patient lives in speculates she fell out of bed and dresser drawer hit her left eye.  Eye is swollen and bruised.

## 2024-04-30 NOTE — ED PROVIDER NOTES
portions of this note were completed with a voice recognition program.  Efforts were made to edit the dictations but occasionally words are mis-transcribed.)    French Dietz MD (electronically signed)  Emergency Attending Physician            French Dietz MD  05/01/24 1128

## 2024-04-30 NOTE — ED NOTES
Care assumed from Dr. Dietz, 79-year-old female presenting with complaints of facial contusion after a fall from bed.  Seen yesterday under similar circumstances, urine at that time unremarkable, lab work performed today without acute abnormality, head CT also without injury.  Discussed results with patient at bedside, advise fall precautions, primary care follow-up, return precautions given.     Candido David MD  04/30/24 0729

## 2024-04-30 NOTE — ED TRIAGE NOTES
Pt is coming from an assisted living, pt was found on the floor in the bathroom about 30 mins ago. Pt has had 3 falls in the last 24 hours per staff.   Per EMS pt did complain of abd pain and butt pain.   Pt denies any pain at the moment

## 2024-04-30 NOTE — ED NOTES
Report called to [facility] at [phone]. Spoke to Jossy CAR the . Luba (patient's son) called to updated him on test results and plan of care

## 2024-04-30 NOTE — ED NOTES
Called [facility] @[phone] [facility] [phone]  Canceled [facility] transfer @[phone]

## 2024-04-30 NOTE — ED NOTES
Patient does not appear to be in any acute distress/shows no evidence of clinical instability at this time.     Provider has reviewed discharge instructions with the patient/family.  The patient/family verbalized understanding instructions as well as need for follow up for any further symptoms.     Discharge papers given, education provided, and any questions answered. Patient discharged by provider.    H2H at bedside in process of transferring patient back to [facility]. Patient is alert to baseline, V/S stable. Report given to transport team.     [facility] contacted twice to speak with nurse, with no response. Message saved on [facility] nurse line.

## 2024-04-30 NOTE — ED PROVIDER NOTES
[facility] EMERGENCY DEPT  EMERGENCY DEPARTMENT ENCOUNTER      Pt Name: Eva Salcido  MRN: [id number]  Birthdate 1945  Date of evaluation: 4/29/2024  Provider: Max Lora MD      HISTORY OF PRESENT ILLNESS      78-year-old female history of dementia, diabetes presents to the emergency department via EMS after a ground-level fall.  She is a very poor historian secondary to dementia.  She is apparently at her baseline mental status but has fallen several times in the last 24 hours.  EMS reports that they were called to the scene where they found her on the floor the bathroom without complaints.  Patient is pleasantly demented when I interviewed her and tells me she thinks her legs hurt.    The history is provided by the patient, the EMS personnel and medical records.           Nursing Notes were reviewed.    REVIEW OF SYSTEMS         Review of Systems        PAST MEDICAL HISTORY     Past Medical History:   Diagnosis Date    Depression     DM (diabetes mellitus) (HCC)     Hypercholesterolemia     Hypothyroid     KIKA on CPAP     Dr. SHAKIRA George     Urinary incontinence          SURGICAL HISTORY       Past Surgical History:   Procedure Laterality Date    COLONOSCOPY      GYN      HEENT      ORTHOPEDIC SURGERY      IA UNLISTED PROCEDURE ABDOMEN PERITONEUM & OMENTUM      UPPER GASTROINTESTINAL ENDOSCOPY           CURRENT MEDICATIONS       Previous Medications    ACETAMINOPHEN (TYLENOL) 325 MG TABLET    Take 2 tablets by mouth every 4 hours as needed    ASPIRIN 81 MG CHEWABLE TABLET    Take 1 tablet by mouth daily    ATORVASTATIN (LIPITOR) 40 MG TABLET    Take 1 tablet by mouth nightly    BUPROPION (WELLBUTRIN SR) 150 MG EXTENDED RELEASE TABLET    Take 1 tablet by mouth daily    BUSPIRONE (BUSPAR) 10 MG TABLET    Take 1 tablet by mouth daily    CHOLINE FENOFIBRATE (TRILIPIX) 135 MG CPDR DELAYED RELEASE CAPSULE    Take 1 capsule by mouth daily    DONEPEZIL (ARICEPT) 5 MG TABLET    Take 1 tablet by mouth nightly

## 2024-04-30 NOTE — ED NOTES
ED Course as of 04/29/24 2258 Mon Apr 29, 2024 2022 ED EKG interpretation:  Rhythm: sinus rhythm. Rate (approx.): 75.  Axis: Left axis deviation.  ST segment:  No concerning ST elevations or depressions. This EKG was interpreted by Max Lora MD,ED Physician. [JM]   9632 I have independently viewed the obtained radiographic images and note pelvis x-ray without acute process. Will await radiology read. [JM]   [id number] Signout received from Dr. Lora.  Patient pending CT c-spine  In summary: Patient from assisted living facility had ground-level fall.  Imaging pelvis with no fractures, CT head unremarkable.  Urinalysis with no signs urinary tract infection.  Dr. Lora reports pending CT C-spine if unremarkable can be discharged home/back to assisted living facility   [ZD]      ED Course User Index  [JM] Max Lora MD  [ZD] French Dietz MD     Patient has no complaints of neck pain at this time.  I discussed lab and imaging findings.  Discussed degenerative disc disease of the cervical spine with neuroforaminal narrowing.  Patient not having neurologic symptoms.  Discussed referral information for orthopedics as needed.  Patient stable for discharge.    Vitals:    04/29/24 2019 04/29/24 2023   BP: 121/60    Pulse: 76 77   Resp: 14    Temp: 98.6 °F (37 °C)    TempSrc: Oral    SpO2: 98%    Weight: 56 kg (123 lb 7.3 oz)    Height: 1.6 m (5' 3\")            Results for orders placed or performed during the hospital encounter of 04/29/24   Urinalysis with Reflex to Culture    Specimen: Urine   Result Value Ref Range    Color, UA DARK YELLOW      Appearance CLEAR CLEAR      Specific Gravity, UA 1.027 1.003 - 1.030      pH, Urine 5.5 5.0 - 8.0      Protein, UA Negative NEG mg/dL    Glucose, UA Negative NEG mg/dL    Ketones, Urine TRACE (A) NEG mg/dL    Bilirubin Urine Negative NEG      Blood, Urine Negative NEG      Urobilinogen, Urine 1.0 0.2 - 1.0 EU/dL    Nitrite, Urine Negative NEG      Leukocyte